# Patient Record
Sex: MALE | Race: WHITE | NOT HISPANIC OR LATINO | Employment: OTHER | URBAN - METROPOLITAN AREA
[De-identification: names, ages, dates, MRNs, and addresses within clinical notes are randomized per-mention and may not be internally consistent; named-entity substitution may affect disease eponyms.]

---

## 2017-09-11 ENCOUNTER — HOSPITAL ENCOUNTER (INPATIENT)
Facility: MEDICAL CENTER | Age: 62
LOS: 2 days | DRG: 247 | End: 2017-09-13
Attending: EMERGENCY MEDICINE | Admitting: INTERNAL MEDICINE
Payer: MEDICARE

## 2017-09-11 ENCOUNTER — RESOLUTE PROFESSIONAL BILLING HOSPITAL PROF FEE (OUTPATIENT)
Dept: HOSPITALIST | Facility: MEDICAL CENTER | Age: 62
End: 2017-09-11
Payer: MEDICARE

## 2017-09-11 DIAGNOSIS — I21.3 ST ELEVATION MYOCARDIAL INFARCTION (STEMI), UNSPECIFIED ARTERY (HCC): ICD-10-CM

## 2017-09-11 PROBLEM — Z86.79 HISTORY OF CONGESTIVE HEART FAILURE: Status: ACTIVE | Noted: 2017-09-11

## 2017-09-11 LAB
ALBUMIN SERPL BCP-MCNC: 4.1 G/DL (ref 3.2–4.9)
ALBUMIN/GLOB SERPL: 1.5 G/DL
ALP SERPL-CCNC: 51 U/L (ref 30–99)
ALT SERPL-CCNC: 20 U/L (ref 2–50)
ANION GAP SERPL CALC-SCNC: 6 MMOL/L (ref 0–11.9)
APTT PPP: 80.2 SEC (ref 24.7–36)
AST SERPL-CCNC: 64 U/L (ref 12–45)
BILIRUB SERPL-MCNC: 0.6 MG/DL (ref 0.1–1.5)
BNP SERPL-MCNC: 43 PG/ML (ref 0–100)
BUN SERPL-MCNC: 14 MG/DL (ref 8–22)
CALCIUM SERPL-MCNC: 9 MG/DL (ref 8.5–10.5)
CHLORIDE SERPL-SCNC: 105 MMOL/L (ref 96–112)
CK MB SERPL-MCNC: 102.3 NG/ML (ref 0–5)
CO2 SERPL-SCNC: 25 MMOL/L (ref 20–33)
CREAT SERPL-MCNC: 0.99 MG/DL (ref 0.5–1.4)
EKG IMPRESSION: NORMAL
EKG IMPRESSION: NORMAL
ERYTHROCYTE [DISTWIDTH] IN BLOOD BY AUTOMATED COUNT: 40.8 FL (ref 35.9–50)
GFR SERPL CREATININE-BSD FRML MDRD: >60 ML/MIN/1.73 M 2
GLOBULIN SER CALC-MCNC: 2.8 G/DL (ref 1.9–3.5)
GLUCOSE SERPL-MCNC: 128 MG/DL (ref 65–99)
HCT VFR BLD AUTO: 40.7 % (ref 42–52)
HGB BLD-MCNC: 13.9 G/DL (ref 14–18)
INR PPP: 1.06 (ref 0.87–1.13)
MCH RBC QN AUTO: 29.4 PG (ref 27–33)
MCHC RBC AUTO-ENTMCNC: 34.2 G/DL (ref 33.7–35.3)
MCV RBC AUTO: 86 FL (ref 81.4–97.8)
PLATELET # BLD AUTO: 198 K/UL (ref 164–446)
PMV BLD AUTO: 10.4 FL (ref 9–12.9)
POTASSIUM SERPL-SCNC: 4.4 MMOL/L (ref 3.6–5.5)
PROT SERPL-MCNC: 6.9 G/DL (ref 6–8.2)
PROTHROMBIN TIME: 14.1 SEC (ref 12–14.6)
RBC # BLD AUTO: 4.73 M/UL (ref 4.7–6.1)
SODIUM SERPL-SCNC: 136 MMOL/L (ref 135–145)
TROPONIN I SERPL-MCNC: 268.36 NG/ML (ref 0–0.04)
TROPONIN I SERPL-MCNC: 5.76 NG/ML (ref 0–0.04)
TROPONIN I SERPL-MCNC: >500 NG/ML (ref 0–0.04)
WBC # BLD AUTO: 12.3 K/UL (ref 4.8–10.8)

## 2017-09-11 PROCEDURE — 93005 ELECTROCARDIOGRAM TRACING: CPT | Performed by: INTERNAL MEDICINE

## 2017-09-11 PROCEDURE — 84484 ASSAY OF TROPONIN QUANT: CPT

## 2017-09-11 PROCEDURE — C1894 INTRO/SHEATH, NON-LASER: HCPCS

## 2017-09-11 PROCEDURE — 700111 HCHG RX REV CODE 636 W/ 250 OVERRIDE (IP)

## 2017-09-11 PROCEDURE — 82553 CREATINE MB FRACTION: CPT

## 2017-09-11 PROCEDURE — A9270 NON-COVERED ITEM OR SERVICE: HCPCS | Performed by: INTERNAL MEDICINE

## 2017-09-11 PROCEDURE — 770022 HCHG ROOM/CARE - ICU (200)

## 2017-09-11 PROCEDURE — 93010 ELECTROCARDIOGRAM REPORT: CPT | Mod: 76 | Performed by: INTERNAL MEDICINE

## 2017-09-11 PROCEDURE — 700101 HCHG RX REV CODE 250

## 2017-09-11 PROCEDURE — 99407 BEHAV CHNG SMOKING > 10 MIN: CPT

## 2017-09-11 PROCEDURE — 99153 MOD SED SAME PHYS/QHP EA: CPT

## 2017-09-11 PROCEDURE — 99152 MOD SED SAME PHYS/QHP 5/>YRS: CPT

## 2017-09-11 PROCEDURE — 93005 ELECTROCARDIOGRAM TRACING: CPT | Performed by: EMERGENCY MEDICINE

## 2017-09-11 PROCEDURE — 4A023N7 MEASUREMENT OF CARDIAC SAMPLING AND PRESSURE, LEFT HEART, PERCUTANEOUS APPROACH: ICD-10-PCS | Performed by: INTERNAL MEDICINE

## 2017-09-11 PROCEDURE — C1769 GUIDE WIRE: HCPCS

## 2017-09-11 PROCEDURE — C1725 CATH, TRANSLUMIN NON-LASER: HCPCS

## 2017-09-11 PROCEDURE — B2151ZZ FLUOROSCOPY OF LEFT HEART USING LOW OSMOLAR CONTRAST: ICD-10-PCS | Performed by: INTERNAL MEDICINE

## 2017-09-11 PROCEDURE — 93458 L HRT ARTERY/VENTRICLE ANGIO: CPT

## 2017-09-11 PROCEDURE — 027034Z DILATION OF CORONARY ARTERY, ONE ARTERY WITH DRUG-ELUTING INTRALUMINAL DEVICE, PERCUTANEOUS APPROACH: ICD-10-PCS | Performed by: INTERNAL MEDICINE

## 2017-09-11 PROCEDURE — 85610 PROTHROMBIN TIME: CPT

## 2017-09-11 PROCEDURE — C1887 CATHETER, GUIDING: HCPCS

## 2017-09-11 PROCEDURE — B2111ZZ FLUOROSCOPY OF MULTIPLE CORONARY ARTERIES USING LOW OSMOLAR CONTRAST: ICD-10-PCS | Performed by: INTERNAL MEDICINE

## 2017-09-11 PROCEDURE — 700111 HCHG RX REV CODE 636 W/ 250 OVERRIDE (IP): Performed by: INTERNAL MEDICINE

## 2017-09-11 PROCEDURE — 85730 THROMBOPLASTIN TIME PARTIAL: CPT

## 2017-09-11 PROCEDURE — C9606 PERC D-E COR REVASC W AMI S: HCPCS | Mod: LD

## 2017-09-11 PROCEDURE — 83880 ASSAY OF NATRIURETIC PEPTIDE: CPT

## 2017-09-11 PROCEDURE — 360979 HCHG DIAGNOSTIC CATH

## 2017-09-11 PROCEDURE — 700102 HCHG RX REV CODE 250 W/ 637 OVERRIDE(OP): Performed by: INTERNAL MEDICINE

## 2017-09-11 PROCEDURE — 304952 HCHG R 2 PADS

## 2017-09-11 PROCEDURE — 700102 HCHG RX REV CODE 250 W/ 637 OVERRIDE(OP)

## 2017-09-11 PROCEDURE — 80053 COMPREHEN METABOLIC PANEL: CPT

## 2017-09-11 PROCEDURE — 99285 EMERGENCY DEPT VISIT HI MDM: CPT

## 2017-09-11 PROCEDURE — C1874 STENT, COATED/COV W/DEL SYS: HCPCS

## 2017-09-11 PROCEDURE — A9270 NON-COVERED ITEM OR SERVICE: HCPCS

## 2017-09-11 PROCEDURE — 85027 COMPLETE CBC AUTOMATED: CPT

## 2017-09-11 PROCEDURE — 307093 HCHG TR BAND RADIAL

## 2017-09-11 PROCEDURE — 700105 HCHG RX REV CODE 258: Performed by: INTERNAL MEDICINE

## 2017-09-11 RX ORDER — OXYCODONE HYDROCHLORIDE 5 MG/1
5 TABLET ORAL EVERY 4 HOURS PRN
Status: DISCONTINUED | OUTPATIENT
Start: 2017-09-11 | End: 2017-09-13 | Stop reason: HOSPADM

## 2017-09-11 RX ORDER — AMOXICILLIN 250 MG
2 CAPSULE ORAL 2 TIMES DAILY
Status: DISCONTINUED | OUTPATIENT
Start: 2017-09-11 | End: 2017-09-13 | Stop reason: HOSPADM

## 2017-09-11 RX ORDER — ACETAMINOPHEN 325 MG/1
650 TABLET ORAL EVERY 6 HOURS PRN
Status: DISCONTINUED | OUTPATIENT
Start: 2017-09-11 | End: 2017-09-13 | Stop reason: HOSPADM

## 2017-09-11 RX ORDER — ONDANSETRON 4 MG/1
4 TABLET, ORALLY DISINTEGRATING ORAL EVERY 4 HOURS PRN
Status: DISCONTINUED | OUTPATIENT
Start: 2017-09-11 | End: 2017-09-13 | Stop reason: HOSPADM

## 2017-09-11 RX ORDER — PRASUGREL 10 MG/1
10 TABLET, FILM COATED ORAL DAILY
Status: DISCONTINUED | OUTPATIENT
Start: 2017-09-12 | End: 2017-09-13 | Stop reason: HOSPADM

## 2017-09-11 RX ORDER — ONDANSETRON 2 MG/ML
4 INJECTION INTRAMUSCULAR; INTRAVENOUS EVERY 4 HOURS PRN
Status: DISCONTINUED | OUTPATIENT
Start: 2017-09-11 | End: 2017-09-11

## 2017-09-11 RX ORDER — LISINOPRIL 5 MG/1
5 TABLET ORAL
Status: DISCONTINUED | OUTPATIENT
Start: 2017-09-12 | End: 2017-09-13 | Stop reason: HOSPADM

## 2017-09-11 RX ORDER — POLYETHYLENE GLYCOL 3350 17 G/17G
1 POWDER, FOR SOLUTION ORAL
Status: DISCONTINUED | OUTPATIENT
Start: 2017-09-11 | End: 2017-09-13 | Stop reason: HOSPADM

## 2017-09-11 RX ORDER — PROMETHAZINE HYDROCHLORIDE 12.5 MG/1
12.5-25 SUPPOSITORY RECTAL EVERY 4 HOURS PRN
Status: DISCONTINUED | OUTPATIENT
Start: 2017-09-11 | End: 2017-09-13 | Stop reason: HOSPADM

## 2017-09-11 RX ORDER — BUDESONIDE AND FORMOTEROL FUMARATE DIHYDRATE 160; 4.5 UG/1; UG/1
2 AEROSOL RESPIRATORY (INHALATION)
Status: DISCONTINUED | OUTPATIENT
Start: 2017-09-11 | End: 2017-09-12

## 2017-09-11 RX ORDER — HALOPERIDOL 5 MG/ML
1 INJECTION INTRAMUSCULAR EVERY 6 HOURS PRN
Status: DISCONTINUED | OUTPATIENT
Start: 2017-09-11 | End: 2017-09-13 | Stop reason: HOSPADM

## 2017-09-11 RX ORDER — PROMETHAZINE HYDROCHLORIDE 25 MG/1
12.5-25 TABLET ORAL EVERY 4 HOURS PRN
Status: DISCONTINUED | OUTPATIENT
Start: 2017-09-11 | End: 2017-09-13 | Stop reason: HOSPADM

## 2017-09-11 RX ORDER — DIPHENHYDRAMINE HYDROCHLORIDE 50 MG/ML
25 INJECTION INTRAMUSCULAR; INTRAVENOUS EVERY 6 HOURS PRN
Status: DISCONTINUED | OUTPATIENT
Start: 2017-09-11 | End: 2017-09-13 | Stop reason: HOSPADM

## 2017-09-11 RX ORDER — ASPIRIN 325 MG
324 TABLET ORAL ONCE
Status: ACTIVE | OUTPATIENT
Start: 2017-09-11 | End: 2017-09-12

## 2017-09-11 RX ORDER — MORPHINE SULFATE 4 MG/ML
2-4 INJECTION, SOLUTION INTRAMUSCULAR; INTRAVENOUS
Status: DISCONTINUED | OUTPATIENT
Start: 2017-09-11 | End: 2017-09-13 | Stop reason: HOSPADM

## 2017-09-11 RX ORDER — SCOLOPAMINE TRANSDERMAL SYSTEM 1 MG/1
1 PATCH, EXTENDED RELEASE TRANSDERMAL
Status: DISCONTINUED | OUTPATIENT
Start: 2017-09-11 | End: 2017-09-13 | Stop reason: HOSPADM

## 2017-09-11 RX ORDER — NITROGLYCERIN 0.4 MG/1
0.4 TABLET SUBLINGUAL
Status: DISCONTINUED | OUTPATIENT
Start: 2017-09-11 | End: 2017-09-13 | Stop reason: HOSPADM

## 2017-09-11 RX ORDER — ALBUTEROL SULFATE 90 UG/1
2 AEROSOL, METERED RESPIRATORY (INHALATION) EVERY 6 HOURS PRN
Status: DISCONTINUED | OUTPATIENT
Start: 2017-09-11 | End: 2017-09-13 | Stop reason: HOSPADM

## 2017-09-11 RX ORDER — PRASUGREL 10 MG/1
TABLET, FILM COATED ORAL
Status: COMPLETED
Start: 2017-09-11 | End: 2017-09-11

## 2017-09-11 RX ORDER — MIDAZOLAM HYDROCHLORIDE 1 MG/ML
INJECTION INTRAMUSCULAR; INTRAVENOUS
Status: COMPLETED
Start: 2017-09-11 | End: 2017-09-11

## 2017-09-11 RX ORDER — BISACODYL 10 MG
10 SUPPOSITORY, RECTAL RECTAL
Status: DISCONTINUED | OUTPATIENT
Start: 2017-09-11 | End: 2017-09-13 | Stop reason: HOSPADM

## 2017-09-11 RX ORDER — CARVEDILOL 3.12 MG/1
3.12 TABLET ORAL 2 TIMES DAILY WITH MEALS
Status: DISCONTINUED | OUTPATIENT
Start: 2017-09-12 | End: 2017-09-13 | Stop reason: HOSPADM

## 2017-09-11 RX ORDER — LIDOCAINE HYDROCHLORIDE 20 MG/ML
INJECTION, SOLUTION INFILTRATION; PERINEURAL
Status: COMPLETED
Start: 2017-09-11 | End: 2017-09-11

## 2017-09-11 RX ORDER — HEPARIN SODIUM 5000 [USP'U]/ML
5000 INJECTION, SOLUTION INTRAVENOUS; SUBCUTANEOUS EVERY 8 HOURS
Status: DISCONTINUED | OUTPATIENT
Start: 2017-09-12 | End: 2017-09-13 | Stop reason: HOSPADM

## 2017-09-11 RX ORDER — VERAPAMIL HYDROCHLORIDE 2.5 MG/ML
INJECTION, SOLUTION INTRAVENOUS
Status: COMPLETED
Start: 2017-09-11 | End: 2017-09-11

## 2017-09-11 RX ORDER — PRASUGREL 10 MG/1
60 TABLET, FILM COATED ORAL ONCE
Status: COMPLETED | OUTPATIENT
Start: 2017-09-11 | End: 2017-09-11

## 2017-09-11 RX ORDER — TIOTROPIUM BROMIDE 18 UG/1
1 CAPSULE ORAL; RESPIRATORY (INHALATION)
Status: DISCONTINUED | OUTPATIENT
Start: 2017-09-12 | End: 2017-09-12

## 2017-09-11 RX ORDER — HEPARIN SODIUM,PORCINE 1000/ML
VIAL (ML) INJECTION
Status: COMPLETED
Start: 2017-09-11 | End: 2017-09-11

## 2017-09-11 RX ORDER — DEXAMETHASONE SODIUM PHOSPHATE 4 MG/ML
4 INJECTION, SOLUTION INTRA-ARTICULAR; INTRALESIONAL; INTRAMUSCULAR; INTRAVENOUS; SOFT TISSUE
Status: DISCONTINUED | OUTPATIENT
Start: 2017-09-11 | End: 2017-09-13 | Stop reason: HOSPADM

## 2017-09-11 RX ORDER — ATORVASTATIN CALCIUM 20 MG/1
40 TABLET, FILM COATED ORAL
Status: DISCONTINUED | OUTPATIENT
Start: 2017-09-11 | End: 2017-09-13 | Stop reason: HOSPADM

## 2017-09-11 RX ORDER — NICOTINE 21 MG/24HR
21 PATCH, TRANSDERMAL 24 HOURS TRANSDERMAL
Status: DISCONTINUED | OUTPATIENT
Start: 2017-09-11 | End: 2017-09-13 | Stop reason: HOSPADM

## 2017-09-11 RX ORDER — ONDANSETRON 2 MG/ML
4 INJECTION INTRAMUSCULAR; INTRAVENOUS EVERY 4 HOURS PRN
Status: DISCONTINUED | OUTPATIENT
Start: 2017-09-11 | End: 2017-09-13 | Stop reason: HOSPADM

## 2017-09-11 RX ADMIN — EPTIFIBATIDE 17.2 MG: 2 INJECTION, SOLUTION INTRAVENOUS at 07:48

## 2017-09-11 RX ADMIN — LIDOCAINE HYDROCHLORIDE: 20 INJECTION, SOLUTION INFILTRATION; PERINEURAL at 07:35

## 2017-09-11 RX ADMIN — OXYCODONE HYDROCHLORIDE 5 MG: 5 TABLET ORAL at 17:14

## 2017-09-11 RX ADMIN — PRASUGREL 60 MG: 10 TABLET, FILM COATED ORAL at 08:05

## 2017-09-11 RX ADMIN — BIVALIRUDIN 1.75 MG/KG/HR: 250 INJECTION, POWDER, LYOPHILIZED, FOR SOLUTION INTRAVENOUS at 07:45

## 2017-09-11 RX ADMIN — STANDARDIZED SENNA CONCENTRATE AND DOCUSATE SODIUM 2 TABLET: 8.6; 5 TABLET, FILM COATED ORAL at 19:23

## 2017-09-11 RX ADMIN — HEPARIN SODIUM: 1000 INJECTION, SOLUTION INTRAVENOUS; SUBCUTANEOUS at 07:36

## 2017-09-11 RX ADMIN — Medication 60 MG: at 08:05

## 2017-09-11 RX ADMIN — NITROGLYCERIN 10 ML: 20 INJECTION INTRAVENOUS at 07:35

## 2017-09-11 RX ADMIN — BUDESONIDE AND FORMOTEROL FUMARATE DIHYDRATE 2 PUFF: 160; 4.5 AEROSOL RESPIRATORY (INHALATION) at 15:13

## 2017-09-11 RX ADMIN — MIDAZOLAM 1.5 MG: 1 INJECTION INTRAMUSCULAR; INTRAVENOUS at 08:04

## 2017-09-11 RX ADMIN — OXYCODONE HYDROCHLORIDE 5 MG: 5 TABLET ORAL at 21:06

## 2017-09-11 RX ADMIN — OXYCODONE HYDROCHLORIDE 5 MG: 5 TABLET ORAL at 12:57

## 2017-09-11 RX ADMIN — ATORVASTATIN CALCIUM 40 MG: 20 TABLET, FILM COATED ORAL at 19:23

## 2017-09-11 RX ADMIN — VERAPAMIL HYDROCHLORIDE 2.5 MG: 2.5 INJECTION, SOLUTION INTRAVENOUS at 07:48

## 2017-09-11 RX ADMIN — NICOTINE 21 MG: 21 PATCH, EXTENDED RELEASE TRANSDERMAL at 12:14

## 2017-09-11 RX ADMIN — OXYCODONE HYDROCHLORIDE 5 MG: 5 TABLET ORAL at 08:56

## 2017-09-11 RX ADMIN — HEPARIN SODIUM 2000 UNITS: 200 INJECTION, SOLUTION INTRAVENOUS at 07:47

## 2017-09-11 RX ADMIN — FENTANYL CITRATE 50 MCG: 50 INJECTION, SOLUTION INTRAMUSCULAR; INTRAVENOUS at 08:04

## 2017-09-11 ASSESSMENT — ENCOUNTER SYMPTOMS
MYALGIAS: 0
BACK PAIN: 0
HEADACHES: 0
ABDOMINAL PAIN: 0
FOCAL WEAKNESS: 0
NAUSEA: 0
DIARRHEA: 0
DIZZINESS: 0
BLOOD IN STOOL: 0
PALPITATIONS: 0
SORE THROAT: 0
SHORTNESS OF BREATH: 0
DEPRESSION: 0
CHILLS: 0
WEAKNESS: 0
VOMITING: 0
FEVER: 0
HEARTBURN: 0
HALLUCINATIONS: 0
COUGH: 0

## 2017-09-11 ASSESSMENT — COPD QUESTIONNAIRES
COPD SCREENING SCORE: 9
DURING THE PAST 4 WEEKS HOW MUCH DID YOU FEEL SHORT OF BREATH: MOST  OR ALL OF THE TIME
HAVE YOU SMOKED AT LEAST 100 CIGARETTES IN YOUR ENTIRE LIFE: YES
DO YOU EVER COUGH UP ANY MUCUS OR PHLEGM?: YES, EVERY DAY

## 2017-09-11 ASSESSMENT — PATIENT HEALTH QUESTIONNAIRE - PHQ9
1. LITTLE INTEREST OR PLEASURE IN DOING THINGS: NOT AT ALL
2. FEELING DOWN, DEPRESSED, IRRITABLE, OR HOPELESS: NOT AT ALL
SUM OF ALL RESPONSES TO PHQ QUESTIONS 1-9: 0
SUM OF ALL RESPONSES TO PHQ9 QUESTIONS 1 AND 2: 0

## 2017-09-11 ASSESSMENT — LIFESTYLE VARIABLES
DO YOU DRINK ALCOHOL: NO
EVER_SMOKED: YES
EVER_SMOKED: YES
ALCOHOL_USE: NO

## 2017-09-11 ASSESSMENT — PAIN SCALES - GENERAL
PAINLEVEL_OUTOF10: 7
PAINLEVEL_OUTOF10: 7
PAINLEVEL_OUTOF10: 6
PAINLEVEL_OUTOF10: 7
PAINLEVEL_OUTOF10: 6
PAINLEVEL_OUTOF10: 7
PAINLEVEL_OUTOF10: 6
PAINLEVEL_OUTOF10: 6
PAINLEVEL_OUTOF10: 7
PAINLEVEL_OUTOF10: 6
PAINLEVEL_OUTOF10: 7

## 2017-09-11 NOTE — DISCHARGE PLANNING
Medical Social Work    This  provided lodging letter for pt's family member Yelitza Caruso, and faxed it to [employer] per family's request.  This  also provided a copy to pt's family member as well.

## 2017-09-11 NOTE — ASSESSMENT & PLAN NOTE
He is on 2 L of oxygen nightly, none during the day.  Bronchodilators, RT protocol. Supplemental oxygen at night  Smoking cessation strongly encouraged

## 2017-09-11 NOTE — CARE PLAN
Problem: Nutritional:  Goal: Patient to verbalize or demonstrate understanding of diet  Outcome: MET Date Met: 09/11/17

## 2017-09-11 NOTE — ASSESSMENT & PLAN NOTE
Has been having chest pain since last night, found to have ST elevation and elevated troponin on admission. Was taken to the Cath Lab where a drug-eluting LAD stent was placed to a 100% occluded artery.  Given Angiomax in the Cath Lab  Dual antiplatelet therapy per cardiology  Coreg, Atorvastatin, lisinopril  Home tomorrow

## 2017-09-11 NOTE — PROGRESS NOTES
"0825 Pt admitted from cath lab, condition stable upon arriving to unit. Pt oriented to room and plan of care. Pt states chest pain \"is much better.\" TR band in place on right wrist, no oozing noted. 11mL in band per cath lab RN. Admission profile completed, family at bedside. Will continue to monitor.       "

## 2017-09-11 NOTE — DISCHARGE PLANNING
Care Transition Team Assessment    Pt admitted to [facility] on 09/11/2017 after having to be emergently taken to the Cath Lab.  Pt from [facility].  This  faxed lodging letter to [facility] per family's request.  Pt is on home oxygen through [facility].    Plan: SW will continue to follow.    Information Source  Orientation : Oriented x 4  Information Given By: Patient  Informant's Name: Migue Cervantes  Who is responsible for making decisions for patient? : Patient    Readmission Evaluation  Is this a readmission?: No    Elopement Risk  Legal Hold: No  Ambulatory or Self Mobile in Wheelchair: Yes  Disoriented: No  Psychiatric Symptoms: None  History of Wandering: No  Elopement this Admit: No  Vocalizing Wanting to Leave: No  Displays Behaviors, Body Language Wanting to Leave: No-Not at Risk for Elopement  Elopement Risk: Not at Risk for Elopement    Interdisciplinary Discharge Planning  Does Admitting Nurse Feel This Could be a Complex Discharge?: No  Primary Care Physician: no  Lives with - Patient's Self Care Capacity: Significant Other  Patient or legal guardian wants to designate a caregiver (see row info): No  Support Systems: Children, Family Member(s), Spouse / Significant Other  Housing / Facility: Other (Comments) (travel trailer)  Do You Take your Prescribed Medications Regularly: Yes  Able to Return to Previous ADL's: Yes  Mobility Issues: No  Prior Services: None  Patient Expects to be Discharged to:: home  Assistance Needed: No  Durable Medical Equipment: Home Oxygen  DME Provider / Phone: kassandra    Discharge Preparedness  What is your plan after discharge?: Uncertain - pending medical team collaboration, Home with help  What are your discharge supports?: Child, Spouse  Prior Functional Level: Independent with Activities of Daily Living, Independent with Medication Management  Difficulity with ADLs: None  Difficulity with IADLs: None    Functional Assesment  Prior Functional Level: Independent  with Activities of Daily Living, Independent with Medication Management    Finances  Financial Barriers to Discharge: No  Prescription Coverage: Yes    Vision / Hearing Impairment  Vision Impairment : Yes  Right Eye Vision: Impaired  Left Eye Vision: Impaired  Hearing Impairment : No    Values / Beliefs / Concerns  Values / Beliefs Concerns : No    Advance Directive  Advance Directive?: None  Advance Directive offered?: AD Booklet refused    Domestic Abuse  Have you ever been the victim of abuse or violence?: No  Physical Abuse or Sexual Abuse: No  Verbal Abuse or Emotional Abuse: No  Possible Abuse Reported to:: Not Applicable    Psychological Assessment  History of Substance Abuse: None  History of Psychiatric Problems: No  Non-compliant with Treatment: No  Newly Diagnosed Illness: No    Discharge Risks or Barriers  Discharge risks or barriers?: No    Anticipated Discharge Information  Anticipated discharge disposition: Discharge needs currently unknown, Home  Discharge Address: [address] [employer] [place], [place] [address] [address]

## 2017-09-11 NOTE — H&P
Hospital Medicine History and Physical    Date of Service  9/11/2017    Chief Complaint  No chief complaint on file.      History of Presenting Illness  62 y.o. male with a history of tobacco abuse who presented 9/11/2017 with chest pain.     The patient has been having on and off chest discomfort for the last several weeks to months. He says when he exerts himself he gets a centrally located chest pain, it improves with rest. [facility]. He has no personal history of heart attack in the past.    Last night, around 2 AM he was awakened from sleep with centrally located chest pain. He describes it as severe and rated about a 7 out of 10. It radiated towards both arms and both his arms felt numb. It did not improve or worsen with any position. He denies any associated palpitations or shortness of breath, he did feel slightly nauseated and very anxious. The pain continued so he came to the ER for evaluation.     He denies any recent fevers or chills, no nausea or vomiting.     ER course: He was noted to have ST elevation and elevated troponin. He was taken emergently to the cath lab. He was hemodynamically stable    Primary Care Physician  Skinny Garcia M.D.    Consultants  Dr. Arreola, cardiology    Code Status  Full    Review of Systems  Review of Systems   Constitutional: Negative for chills, fever and malaise/fatigue.   HENT: Negative for sore throat.    Respiratory: Negative for cough and shortness of breath.    Cardiovascular: Positive for chest pain. Negative for palpitations.   Gastrointestinal: Negative for abdominal pain, blood in stool, diarrhea, heartburn, nausea and vomiting.   Genitourinary: Negative for dysuria and frequency.   Musculoskeletal: Negative for back pain and myalgias.   Neurological: Negative for dizziness, focal weakness, weakness and headaches.   Psychiatric/Behavioral: Negative for depression and hallucinations.   All other  systems reviewed and are negative.       Past Medical History  Past Medical History:   Diagnosis Date   • Pain 09-20-12    lower back, 8/10   • Heart burn    • Indigestion        Surgical History  Past Surgical History:   Procedure Laterality Date   • MARQUISE BY LAPAROSCOPY N/A 12/28/2015    Procedure: MARQUISE BY LAPAROSCOPY- With Grams ;  Surgeon: Sandro Obregon M.D.;  Location: SURGERY [facility];  Service:    • LUMBAR DECOMPRESSION  9/25/2012    by Dr. Plata   • LUMBAR FUSION POSTERIOR  9/25/2012    Performed by Mckayla Plata M.D. at SURGERY [facility]   • LUMBAR DECOMPRESSION  9/25/2012    Performed by Mckayla Plata M.D. at SURGERY [facility]   • CERVICAL DISK AND FUSION ANTERIOR  1997/2010    x 2       Medications  No current facility-administered medications on file prior to encounter.      Current Outpatient Prescriptions on File Prior to Encounter   Medication Sig Dispense Refill   • oxycodone immediate-release (ROXICODONE) 5 MG Tab Take 1 Tab by mouth every four hours as needed for Severe Pain. 30 Tab 0   • lactobacillus granules (LACTINEX/FLORANEX) PACK Take 1 Packet by mouth 3 times a day, with meals. 30 Packet 0   • albuterol (VENTOLIN OR PROVENTIL) 108 (90 BASE) MCG/ACT AERS Inhale 2 Puffs by mouth every 6 hours as needed for Shortness of Breath. 8.5 g 3   • lisinopril (PRINIVIL) 10 MG TABS Take 10 mg by mouth every day.         Family History  He states his father had a stroke at age 59 and he believes he had several heart attacks in his 70s, does not know specifics. He says that his grandfather and grandmother on his father's side also had heart attacks, again he does not know details.    Social History  Social History   Substance Use Topics   • Smoking status: Current Every Day Smoker     Packs/day: 2.00     Years: 35.00     Types: Cigarettes   • Smokeless tobacco: Never Used   • Alcohol use No      Comment: hx; quit 2008       Allergies  Allergies   Allergen Reactions   • Nkda [No Known  Drug Allergy]         Physical Exam  Laboratory   Hemodynamics  Temp (24hrs), Av °C (96.8 °F), Min:36 °C (96.8 °F), Max:36 °C (96.8 °F)   Temperature: 36 °C (96.8 °F)  Pulse  Av.8  Min: 57  Max: 78 Heart Rate (Monitored): 60  NIBP: 113/67      Respiratory      Respiration: (!) 24, Pulse Oximetry: 97 %             Physical Exam   Constitutional: He is oriented to person, place, and time. He appears well-developed and well-nourished. No distress.   Neck: No JVD present. No thyromegaly present.   Cardiovascular: Normal rate and regular rhythm.    No murmur heard.  Pulmonary/Chest: No respiratory distress. He has wheezes (Scattered expiratory).   Musculoskeletal: Normal range of motion. He exhibits no edema.   Neurological: He is alert and oriented to person, place, and time. No cranial nerve deficit.   Skin: Skin is warm. No erythema.   Psychiatric: He has a normal mood and affect. His behavior is normal.       Recent Labs      17   0710   WBC  12.3*   RBC  4.73   HEMOGLOBIN  13.9*   HEMATOCRIT  40.7*   MCV  86.0   MCH  29.4   MCHC  34.2   RDW  40.8   PLATELETCT  198   MPV  10.4     Recent Labs      17   0710   SODIUM  136   POTASSIUM  4.4   CHLORIDE  105   CO2  25   GLUCOSE  128*   BUN  14   CREATININE  0.99   CALCIUM  9.0     Recent Labs      17   0710   ALTSGPT  20   ASTSGOT  64*   ALKPHOSPHAT  51   TBILIRUBIN  0.6   GLUCOSE  128*     Recent Labs      17   0710   APTT  80.2*   INR  1.06     Recent Labs      17   0710   BNPBTYPENAT  43         Lab Results   Component Value Date    TROPONINI 5.76 (H) 2017     Urinalysis:    Lab Results  Component Value Date/Time   SPECGRAVITY 1.013 2012   GLUCOSEUR Negative 2012   KETONES Negative 2012   NITRITE Negative 2012         Assessment/Plan     I anticipate this patient will require at least two midnights for appropriate medical management, necessitating inpatient admission.    * STEMI  (ST elevation myocardial infarction) (CMS-HCC)   Assessment & Plan    Has been having chest pain since last night, found to have ST elevation and elevated troponin on admission. Was taken to the Cath Lab where a drug-eluting LAD stent was placed to a 100% occluded artery.  -Given Angiomax in the Cath Lab  -Dual antiplatelet therapy per cardiology  -Start low-dose Coreg  -Atorvastatin  -Continue lisinopril  -Monitor in ICU        Leukocytosis- (present on admission)   Assessment & Plan    Lately due to stress from MI. He has no infectious complaints.  -Repeat in a.m.        Chronic back pain- (present on admission)   Assessment & Plan    He is on oxycodone at home, continue home dose        GERD (gastroesophageal reflux disease)- (present on admission)   Assessment & Plan    Likely due to tobacco, Zofran as needed  -GI cocktail as needed        Tobacco abuse- (present on admission)   Assessment & Plan    Smoking 2-3 packs per day  -counseled for more than 10 minutes on tobacco cessation [address]1228   -Patch        COPD (chronic obstructive pulmonary disease) (CMS-HCC)- (present on admission)   Assessment & Plan    He is on 2 L of oxygen nightly, none during the day.  -Symbicort, albuterol as needed  -Wife is bringing complete list of medications  -Encouraged cessation            VTE prophylaxis: Heparin.       35 minutes of critical care time were spent, including examination and interview of the patient, explanation of prognosis/diagnosis/plan of care, direction of nursing staff and discussion of the patient with other physicians involved.  This time was independent of procedures performed.    Greater than 50% of which was spent at the bedside.

## 2017-09-11 NOTE — CONSULTS
DATE OF SERVICE:  09/11/2017    REQUESTING PHYSICIAN:  Amparo Grider MD    HISTORY OF PRESENT ILLNESS:  The patient is a 62-year-old  male with   a history of hypertension, chronic tobacco use, who was transferred from   [facility] in [place], [place] to [facility]   for management of an acute ST elevation anterior myocardial infarction.    The patient states that he was awakened around 2:00 this morning with severe   diffuse substernal chest pain radiating to his neck and both arms.  He   ultimately went to the local emergency room in [place].  EKG at   [facility] showed an ST elevation anterior myocardial   infarction.  The patient received 9 mg of morphine, aspirin, sublingual   nitroglycerin x3, metoprolol 10 mg IV, and 5000 units of heparin prior to   transfer.    Currently, the patient has 8/10 chest pain.    The patient has no prior history of heart disease.  The patient denies a   history of hyperlipidemia or diabetes mellitus.  He has a family history of   his father having heart disease.    ALLERGIES:  No known drug allergies.    MEDICATIONS:  Prior to admission, lisinopril 10 mg daily, albuterol 2 puffs q.   6 hours as needed.  Oxycodone 1 tablet every 6 hours as needed.    MEDICAL AND SURGICAL HISTORY:  1.  Neck surgery.  2.  Back surgeries x2.  3.  Gastroesophageal reflux disease.  4.  Chronic back pain.  5.  Chronic opiate dependence.  6.  Scrotal cellulitis in 2014, hospitalized at [facility].  7.  Laparoscopic cholecystectomy, 12/28/2015 for acute cholecystitis with a common bile duct stone requiring ERCP. [facility].  8. COPD.  9. Partial gastrectomy for benign gastric tumor. 10/27/2014. [facility], [facility]    SOCIAL HISTORY:  Prior .  Disabled due to back pain.  Followed at   the [facility].  History of alcohol use, currently abstinent since 2008,   and  smoked for 38 years up to 2 packs per day.    FAMILY HISTORY:  Father  of congestive heart failure at age 78.    REVIEW OF SYSTEMS:  Limited due to the acute clinical circumstances, but the   patient denies a history of headache, prior stroke, recent trauma, history of   GI bleeding, prior surgery.  The patient denies a history of kidney disease.    PHYSICAL EXAMINATION:  VITAL SIGNS:  Blood pressure 114/72, pulse 82, temperature 98.4.  GENERAL:  The patient is alert and oriented, in moderate distress.  HEENT:  No facial asymmetry.  Extraocular movements are intact.  NECK:  Thick JVP, difficult to assess.  2+ carotid pulses bilaterally   symmetrical.  CHEST:  Increased AP diameter.  LUNGS:  No wheezes, rales, or rhonchi.  CARDIAC:  Distant heart sounds.  Regular rate and rhythm with no murmurs,   gallops, or rubs.  ABDOMEN:  Protuberant, soft, nontender, healed surgical scar.  EXTREMITIES:  No clubbing, cyanosis, or edema.  Pulses 2+ radial and pedal   pulses bilaterally symmetrical.  SKIN:  Warm and dry.  NEUROLOGIC:  Cranial nerves intact.  No lateralizing findings.    LABORATORY DATA:  Pending.    EKG normal sinus rhythm with an acute anterior injury, current.    ASSESSMENT:  1.  Acute ST elevation anterior myocardial infarction.  2.  Hypertension.  3.  Chronic tobacco use.    RECOMMENDATION:  The patient was informed of his current critical cardiac   condition and it was recommended that he proceed immediately with a cardiac   catheterization.  The patient understood the circumstances and risks of the   procedure and was agreeable to proceed.       ____________________________________     MD TYESHA BERNARD / JUAN    DD:  2017 09:03:00  DT:  2017 09:39:25    D#:  [id number]  Job#:  [id number]

## 2017-09-11 NOTE — CONSULTS
Cardiology Consult Note:    Carina Brandt  Date of Service:    9/11/2017   9:00 AM     Referring MD:   ER/STEMI    Patient ID:   Name:             Migue Cervantes   YOB: 1955  Age:                 62 y.o.  male   MRN:               [id number]                                                             Chief Complaint:      CP    History of Present Illness:    61 y/o male smoker c/o severe substermal CP since 2am today and found to have anterior STEMI on EKG; Transferred from [place] for LHC/PCI;     Review of Systems:      Constitutional: Denies fevers, Denies weight changes  Eyes: Denies changes in vision, no eye pain  Ears/Nose/Throat/Mouth: Denies nasal congestion or sore throat   Cardiovascular: + chest pain, - palpitations   Respiratory: + shortness of breath , Denies cough  Gastrointestinal/Hepatic: Denies abdominal pain, nausea, vomiting, diarrhea, constipation or GI bleeding   Genitourinary: Denies dysuria or frequency  Musculoskeletal/Rheum: Denies  joint pain and swelling   Skin: Denies rash  Neurological: Denies headache, confusion, memory loss or focal weakness/parasthesias  Psychiatric: denies mood disorder   Endocrine: Ariella thyroid problems  Heme/Oncology/Lymph Nodes: Denies enlarged lymph nodes, denies brusing or known bleeding disorder  All other systems were reviewed and are negative (AMA/CMS criteria)                Past Medical History:   Past Medical History:   Diagnosis Date   • Pain 09-20-12    lower back, 8/10   • Heart burn    • Indigestion      Active Hospital Problems    Diagnosis   • STEMI (ST elevation myocardial infarction) (CMS-[facility]) [I21.3]   • Leukocytosis [D72.829]   • GERD (gastroesophageal reflux disease) [K21.9]   • Chronic back pain [M54.9, G89.29]   • COPD (chronic obstructive pulmonary disease) (CMS-[facility]) [J44.9]   • Tobacco abuse [Z72.0]       Past Surgical History:  Past Surgical History:   Procedure Laterality Date   • MARQUISE BY LAPAROSCOPY N/A 12/28/2015     Procedure: MARQUISE BY LAPAROSCOPY- With Grams ;  Surgeon: Sandro Obregon M.D.;  Location: SURGERY [facility];  Service:    • LUMBAR DECOMPRESSION  9/25/2012    by Dr. Plata   • LUMBAR FUSION POSTERIOR  9/25/2012    Performed by Mckayla Plata M.D. at SURGERY [facility]   • LUMBAR DECOMPRESSION  9/25/2012    Performed by Mckayla Plata M.D. at SURGERY [facility]   • CERVICAL DISK AND FUSION ANTERIOR  1997/2010    x 2       Hospital Medications:    Current Facility-Administered Medications:   •  aspirin (ASA) tablet 325 mg, 325 mg, Oral, Once, Amparo Grider M.D., Stopped at 09/11/17 0730  •  dexamethasone (DECADRON) injection 4 mg, 4 mg, Intravenous, Once PRN, Agustín Arreola M.D.  •  diphenhydrAMINE (BENADRYL) injection 25 mg, 25 mg, Intravenous, Q6HRS PRN, Agustín Arreola M.D.  •  haloperidol lactate (HALDOL) injection 1 mg, 1 mg, Intravenous, Q6HRS PRN, Agustín Arreola M.D.  •  scopolamine (TRANSDERM-SCOP) patch 1 Patch, 1 Patch, Transdermal, Q72HRS PRN, Agustín Arreola M.D.  •  [START ON 9/12/2017] prasugrel (EFFIENT) tablet 10 mg, 10 mg, Oral, DAILY, Agustín Arreola M.D.  •  bivalirudin (ANGIOMAX) 250 mg in NS 50 mL Infusion, 0.2 mg/kg/hr, Intravenous, Continuous, Agustín Arreola M.D., Last Rate: 3.8 mL/hr at 09/11/17 0900, 0.2 mg/kg/hr at 09/11/17 0900  •  albuterol inhaler 2 Puff, 2 Puff, Inhalation, Q6HRS PRN, Heidi Anne, D.O.  •  [START ON 9/12/2017] lisinopril (PRINIVIL) tablet 5 mg, 5 mg, Oral, Q DAY, Heidi Anne D.O.  •  oxycodone immediate-release (ROXICODONE) tablet 5 mg, 5 mg, Oral, Q4HRS PRN, MYLA PimentelO., 5 mg at 09/11/17 0856  •  [START ON 9/12/2017] carvedilol (COREG) tablet 3.125 mg, 3.125 mg, Oral, BID WITH MEALS, MYLA PimentelO.  •  atorvastatin (LIPITOR) tablet 40 mg, 40 mg, Oral, QHS, GRAHAM Pimentel.O.  •  senna-docusate (PERICOLACE or SENOKOT S) 8.6-50 MG per tablet 2 Tab, 2 Tab, Oral, BID **AND** polyethylene  glycol/lytes (MIRALAX) PACKET 1 Packet, 1 Packet, Oral, QDAY PRN **AND** magnesium hydroxide (MILK OF MAGNESIA) suspension 30 mL, 30 mL, Oral, QDAY PRN **AND** bisacodyl (DULCOLAX) suppository 10 mg, 10 mg, Rectal, QDAY PRN, MYLA PimentelO.  •  [START ON 9/12/2017] heparin injection 5,000 Units, 5,000 Units, Subcutaneous, Q8HRS, GRAHAM Pimentel.O.  •  acetaminophen (TYLENOL) tablet 650 mg, 650 mg, Oral, Q6HRS PRN, MYLA PimentelO.  •  nitroglycerin (NITROSTAT) tablet 0.4 mg, 0.4 mg, Sublingual, Q5 MIN PRN, MYLA PimentelO.  •  morphine (pf) 4 mg/ml injection 2-4 mg, 2-4 mg, Intravenous, Q5 MIN PRN, MYLA PimentelO.  •  [START ON 9/12/2017] aspirin EC (ECOTRIN) tablet 81 mg, 81 mg, Oral, DAILY, MYLA PimentelO.  •  ondansetron (ZOFRAN) syringe/vial injection 4 mg, 4 mg, Intravenous, Q4HRS PRN, MYLA PimentelO.  •  ondansetron (ZOFRAN ODT) dispertab 4 mg, 4 mg, Oral, Q4HRS PRN, MYLA PimentelO.  •  promethazine (PHENERGAN) tablet 12.5-25 mg, 12.5-25 mg, Oral, Q4HRS PRN, MYLA PimentelO.  •  promethazine (PHENERGAN) suppository 12.5-25 mg, 12.5-25 mg, Rectal, Q4HRS PRN, MYLA PimentelO.  •  prochlorperazine (COMPAZINE) injection 5-10 mg, 5-10 mg, Intravenous, Q4HRS PRN, MYLA PimentelO.  •  Respiratory Care per Protocol, , Nebulization, Continuous RT, Heidi M Lewman, D.O.    Current Outpatient Medications:  Prescriptions Prior to Admission   Medication Sig Dispense Refill Last Dose   • oxycodone immediate-release (ROXICODONE) 5 MG Tab Take 1 Tab by mouth every four hours as needed for Severe Pain. 30 Tab 0    • lactobacillus granules (LACTINEX/FLORANEX) PACK Take 1 Packet by mouth 3 times a day, with meals. 30 Packet 0    • albuterol (VENTOLIN OR PROVENTIL) 108 (90 BASE) MCG/ACT AERS Inhale 2 Puffs by mouth every 6 hours as needed for Shortness of Breath. 8.5 g 3    • lisinopril (PRINIVIL) 10 MG TABS Take 10 mg by mouth every day.   4/3/2014 at Unknown        Medication Allergy:  Allergies   Allergen Reactions   • Nkda [No Known Drug Allergy]        Family History:  No family history of premature CAD, CVA  Social History:  Social History     Social History   • Marital status:      Spouse name: N/A   • Number of children: N/A   • Years of education: N/A     Occupational History   • Not on file.     Social History Main Topics   • Smoking status: Current Every Day Smoker     Packs/day: 2.00     Years: 35.00     Types: Cigarettes   • Smokeless tobacco: Never Used   • Alcohol use No      Comment: hx; quit 2008   • Drug use: No   • Sexual activity: Not on file     Other Topics Concern   • Not on file     Social History Narrative   • No narrative on file         Physical Exam:  Vitals  Weight/BMI: Body mass index is 28.7 kg/m².  Pulse 67, temperature 36 °C (96.8 °F), resp. rate (!) 24, weight 96 kg (211 lb 10.3 oz), SpO2 97 %.  Vitals:    09/11/17 0825 09/11/17 0830 09/11/17 0845 09/11/17 0855   Pulse: 65 68 (!) 57 67   Resp: 19 15 18 (!) 24   Temp: 36 °C (96.8 °F)      SpO2: 97% 97% 98% 97%   Weight:         Oxygen Therapy:  Pulse Oximetry: 97 %, O2 (LPM): 2, O2 Delivery: Silicone Nasal Cannula  General Appearance:   Well developed, Well nourished, No acute distress, Non-toxic appearance.   HENT:  Normocephalic, Atraumatic, Oropharynx moist mucous membranes, Dentition: Mallampati 4 OP, Nose normal.    Eyes:  PERRLA, EOMI, Conjunctiva normal, No discharge.  Neck:  Normal range of motion, No cervical tenderness, Supple, No stridor, no JVD .  No thyromegaly.  No carotid bruit.  Cardiovascular:  Normal heart rate, Normal rhythm,  S1, S2, no S3,  S4; No gallops; No murmurs, No rubs, .   Extremitites with intact distal pulses, no cyanosis, clubbing or edema.  No heaves, thrills, HJR;  Peripheral pulses: carotid 2+, brachial 2+, radial 2+, ulnar 2+, femoral 2+, popliteal 2+, PT 2+, DP 2+;  Lungs:  Respiratory effort is normal. Normal breath sounds, breath sounds clear to  auscultation bilaterally,  no rales, no rhonchi, no wheezing.   Abdomen: Bowel sounds normal, Soft, No tenderness, No guarding, No rebound, No masses, No hepatosplenomegaly.  Skin: Warm, Dry, No erythema, No rash, no induration or crepitus.  Neurologic: Alert & oriented x 3, Normal motor function, Normal sensory function, No focal deficits noted, cranial nerves II through XII are normal,   Psychiatric: Affect normal, Judgment normal, Mood normal.      MDM (Data Review):     Records reviewed and summarized in current documentation    Lab Data Review:  Recent Results (from the past 24 hour(s))   CBC WITHOUT DIFFERENTIAL    Collection Time: 09/11/17  7:10 AM   Result Value Ref Range    WBC 12.3 (H) 4.8 - 10.8 K/uL    RBC 4.73 4.70 - 6.10 M/uL    Hemoglobin 13.9 (L) 14.0 - 18.0 g/dL    Hematocrit 40.7 (L) 42.0 - 52.0 %    MCV 86.0 81.4 - 97.8 fL    MCH 29.4 27.0 - 33.0 pg    MCHC 34.2 33.7 - 35.3 g/dL    RDW 40.8 35.9 - 50.0 fL    Platelet Count 198 164 - 446 K/uL    MPV 10.4 9.0 - 12.9 fL   COMP METABOLIC PANEL    Collection Time: 09/11/17  7:10 AM   Result Value Ref Range    Sodium 136 135 - 145 mmol/L    Potassium 4.4 3.6 - 5.5 mmol/L    Chloride 105 96 - 112 mmol/L    Co2 25 20 - 33 mmol/L    Anion Gap 6.0 0.0 - 11.9    Glucose 128 (H) 65 - 99 mg/dL    Bun 14 8 - 22 mg/dL    Creatinine 0.99 0.50 - 1.40 mg/dL    Calcium 9.0 8.5 - 10.5 mg/dL    AST(SGOT) 64 (H) 12 - 45 U/L    ALT(SGPT) 20 2 - 50 U/L    Alkaline Phosphatase 51 30 - 99 U/L    Total Bilirubin 0.6 0.1 - 1.5 mg/dL    Albumin 4.1 3.2 - 4.9 g/dL    Total Protein 6.9 6.0 - 8.2 g/dL    Globulin 2.8 1.9 - 3.5 g/dL    A-G Ratio 1.5 g/dL   BTYPE NATRIURETIC PEPTIDE    Collection Time: 09/11/17  7:10 AM   Result Value Ref Range    B Natriuretic Peptide 43 0 - 100 pg/mL   PROTHROMBIN TIME    Collection Time: 09/11/17  7:10 AM   Result Value Ref Range    PT 14.1 12.0 - 14.6 sec    INR 1.06 0.87 - 1.13   APTT    Collection Time: 09/11/17  7:10 AM   Result Value Ref  Range    APTT 80.2 (H) 24.7 - 36.0 sec   TROPONIN    Collection Time: 17  7:10 AM   Result Value Ref Range    Troponin I 5.76 (H) 0.00 - 0.04 ng/mL   ESTIMATED GFR    Collection Time: 17  7:10 AM   Result Value Ref Range    GFR If African American >60 >60 mL/min/1.73 m 2    GFR If Non African American >60 >60 mL/min/1.73 m 2   EKG NOW IF NOT DONE    Collection Time: 17  8:34 AM   Result Value Ref Range    Report       [facility]    Test Date:  2017  Pt Name:    MIRLANDE DOZIER                  Department: ER  MRN:        [id number]                      Room:       T629  Gender:     M                            Technician: ALLIE  :        [id number]                   Requested By:ALPESH SCHAFER  Order #:    [id number]                    Reading MD:    Measurements  Intervals                                Axis  Rate:       57                           P:          53  KY:         180                          QRS:        33  QRSD:       92                           T:          93  QT:         456  QTc:        444    Interpretive Statements  SINUS BRADYCARDIA  ANTERIOR INFARCT, AGE INDETERMINATE  Compared to ECG 2015 15:59:18  Myocardial infarct finding now present  Sinus tachycardia no longer present  T-wave abnormality no longer present         Imaging/Procedures Review:    Chest Xray:  Reviewed    EKG:   As in HPI. Ant STEMI    MDM (Assessment and Plan):     Active Hospital Problems    Diagnosis   • STEMI (ST elevation myocardial infarction) (CMS-[facility]) [I21.3]   • Leukocytosis [D72.829]   • GERD (gastroesophageal reflux disease) [K21.9]   • Chronic back pain [M54.9, G89.29]   • COPD (chronic obstructive pulmonary disease) (CMS-HCC) [J44.9]   • Tobacco abuse [Z72.0]     Reported LAD stenting  Rec: BB, ACEI/ARB, DAPT x 12 months;  ICR  Echocardiogram  Smoking cessation  Consult hospitalist for in house care  Will follow  Thx

## 2017-09-11 NOTE — ASSESSMENT & PLAN NOTE
The patient doesn't know if this is systolic or diastolic. [facility]. His EF noted during the cath was 45%. He has no evidence of fluid overload  Await 9/12/2017 echocardiogram

## 2017-09-11 NOTE — ED PROVIDER NOTES
ED Provider Note    Scribed for Amparo Grider M.D. by Isabell De Jesus. 9/11/2017, 7:14 AM.    Primary care provider: Skinny Garcia M.D.  Means of arrival: Ambulance  History obtained from: Patient  History limited by: None    CHIEF COMPLAINT  STEMI    HPI  Migue Cervantes is a 62 y.o. male who presents to the Emergency Department after being brought in by ambulance for acute STEMI consistent on EKG. The patient reports suddenly developing pressured chest pains at 2AM this morning that woke him up from sleep, initially rating the pain 10/10 in severity. He developed associated nausea, sweats, and feeling clammy overall. He was seen at [place] for symptoms and transferred to [facility] ED for higher level care due to acute STEMI found on EKG. He received Heparin, Lopressor, and Aspirin prior to arrival. The patient currently complains of similar chest pains and rates it 8/10 in severity. He has past medical history of hypertension and cardiac disease.     REVIEW OF SYSTEMS  Pertinent positives include chest pain, nausea, sweats, and clamminess. Pertinent negatives include no fever. All other systems reviewed and negative or unable to obtain.   C.    PAST MEDICAL HISTORY   has a past medical history of Heart burn; Indigestion; Hypertension; and Pain (09-20-12).    SURGICAL HISTORY   has a past surgical history that includes cervical disk and fusion anterior (1997/2010); lumbar decompression (9/25/2012); lumbar fusion posterior (9/25/2012); lumbar decompression (9/25/2012); and beto by laparoscopy (N/A, 12/28/2015).    SOCIAL HISTORY  Social History   Substance Use Topics   • Smoking status: Current Every Day Smoker     Packs/day: 2.00     Years: 35.00     Types: Cigarettes   • Smokeless tobacco: Never Used   • Alcohol use No      Comment: hx; quit 2008      History   Drug Use No     FAMILY HISTORY  No family history noted     CURRENT MEDICATIONS    Current Facility-Administered Medications:   •  aspirin (ASA) tablet  325 mg, 325 mg, Oral, Once, Amparo Grider M.D., Stopped at 09/11/17 0730  •  dexamethasone (DECADRON) injection 4 mg, 4 mg, Intravenous, Once PRN, Agustín Arreola M.D.  •  diphenhydrAMINE (BENADRYL) injection 25 mg, 25 mg, Intravenous, Q6HRS PRN, Agustín Arreola M.D.  •  haloperidol lactate (HALDOL) injection 1 mg, 1 mg, Intravenous, Q6HRS PRN, Agustín Arreola M.D.  •  scopolamine (TRANSDERM-SCOP) patch 1 Patch, 1 Patch, Transdermal, Q72HRS PRN, Agustín Arreola M.D.  •  [START ON 9/12/2017] prasugrel (EFFIENT) tablet 10 mg, 10 mg, Oral, DAILY, Agustín Arreola M.D.  •  bivalirudin (ANGIOMAX) 250 mg in NS 50 mL Infusion, 0.2 mg/kg/hr, Intravenous, Continuous, Agustín Arreola M.D., Last Rate: 3.8 mL/hr at 09/11/17 0900, 0.2 mg/kg/hr at 09/11/17 0900  •  albuterol inhaler 2 Puff, 2 Puff, Inhalation, Q6HRS PRN, GRAHAM Pimentel.O.  •  [START ON 9/12/2017] lisinopril (PRINIVIL) tablet 5 mg, 5 mg, Oral, Q DAY, GRHAAM Pimentel.O.  •  oxycodone immediate-release (ROXICODONE) tablet 5 mg, 5 mg, Oral, Q4HRS PRN, GRAHAM Pimentel.O., 5 mg at 09/11/17 1257  •  [START ON 9/12/2017] carvedilol (COREG) tablet 3.125 mg, 3.125 mg, Oral, BID WITH MEALS, GRAHAM Pimentel.O.  •  atorvastatin (LIPITOR) tablet 40 mg, 40 mg, Oral, QHS, GRAHAM Pimentel.O.  •  senna-docusate (PERICOLACE or SENOKOT S) 8.6-50 MG per tablet 2 Tab, 2 Tab, Oral, BID **AND** polyethylene glycol/lytes (MIRALAX) PACKET 1 Packet, 1 Packet, Oral, QDAY PRN **AND** magnesium hydroxide (MILK OF MAGNESIA) suspension 30 mL, 30 mL, Oral, QDAY PRN **AND** bisacodyl (DULCOLAX) suppository 10 mg, 10 mg, Rectal, QDAY PRN, MYLA PimentelO.  •  [START ON 9/12/2017] heparin injection 5,000 Units, 5,000 Units, Subcutaneous, Q8HRS, GRAHAM Pimentel.O.  •  acetaminophen (TYLENOL) tablet 650 mg, 650 mg, Oral, Q6HRS PRN, MYLA PimentelO.  •  nitroglycerin (NITROSTAT) tablet 0.4 mg, 0.4 mg, Sublingual, Q5 MIN PRN,  Heidi Anne D.O.  •  morphine (pf) 4 mg/ml injection 2-4 mg, 2-4 mg, Intravenous, Q5 MIN PRN, Heidi Anne D.O.  •  [START ON 9/12/2017] aspirin EC (ECOTRIN) tablet 81 mg, 81 mg, Oral, DAILY, MYLA PimentelO.  •  ondansetron (ZOFRAN) syringe/vial injection 4 mg, 4 mg, Intravenous, Q4HRS PRN, Heidi Anne D.O.  •  ondansetron (ZOFRAN ODT) dispertab 4 mg, 4 mg, Oral, Q4HRS PRN, Heidi Anne D.O.  •  promethazine (PHENERGAN) tablet 12.5-25 mg, 12.5-25 mg, Oral, Q4HRS PRN, Heidi Anne D.O.  •  promethazine (PHENERGAN) suppository 12.5-25 mg, 12.5-25 mg, Rectal, Q4HRS PRN, Heidi Anne D.O.  •  prochlorperazine (COMPAZINE) injection 5-10 mg, 5-10 mg, Intravenous, Q4HRS PRN, Heidi Anne D.O.  •  Respiratory Care per Protocol, , Nebulization, Continuous RT, Heidi Anne D.O.  •  nicotine (NICODERM) 21 MG/24HR 21 mg, 21 mg, Transdermal, Daily-0600, Heidi Anne D.O., 21 mg at 09/11/17 1214  •  budesonide-formoterol (SYMBICORT) 160-4.5 MCG/ACT inhaler 2 Puff, 2 Puff, Inhalation, BID (RT), Heidi Anne D.O.     ALLERGIES  Allergies   Allergen Reactions   • Nkda [No Known Drug Allergy]        PHYSICAL EXAM  VITAL SIGNS: Wt 96 kg (211 lb 10.3 oz)   BMI 28.70 kg/m²    Pulse 60   Temp 36.3 °C (97.3 °F)   Resp 19   Ht 1.829 m (6')   Wt 103 kg (227 lb)   SpO2 100%   BMI 30.79 kg/m²     Constitutional:  Sitting up in gurney, able to answer questions  HENT: Nose is normal in appearance, external ears are normal,  moist mucous membranes  Eyes: Anicteric,  pupils are equal round and reactive, there is no conjunctival drainage or pallor   Neck: The trachea is midline, there is no obvious mass or meningeal signs  Cardiovascular: Good perfusion, regular rate and rhythm without murmurs gallops or rubs  Thorax & Lungs: Respiratory rate and effort are normal. There is normal chest excursion with respiration.  No wheezes rhonchi or rales noted.  Abdomen: Abdomen is normal in  appearance, no gross peritoneal signs  normal bowel sounds, no pain with cough  :  No CVA tenderness to palpation  Musculoskeletal: No deformities noted in all 4 extremities.   Skin: Visualized skin is warm without rash.  Neurologic:  Cranial nerves II through XII are intact there is no focal abnormality noted.  Psychiatric: Normal mood and mentation    DIAGNOSTIC STUDIES / PROCEDURES    LABS  Results for orders placed or performed during the hospital encounter of 09/11/17   CBC WITHOUT DIFFERENTIAL   Result Value Ref Range    WBC 12.3 (H) 4.8 - 10.8 K/uL    RBC 4.73 4.70 - 6.10 M/uL    Hemoglobin 13.9 (L) 14.0 - 18.0 g/dL    Hematocrit 40.7 (L) 42.0 - 52.0 %    MCV 86.0 81.4 - 97.8 fL    MCH 29.4 27.0 - 33.0 pg    MCHC 34.2 33.7 - 35.3 g/dL    RDW 40.8 35.9 - 50.0 fL    Platelet Count 198 164 - 446 K/uL    MPV 10.4 9.0 - 12.9 fL   COMP METABOLIC PANEL   Result Value Ref Range    Sodium 136 135 - 145 mmol/L    Potassium 4.4 3.6 - 5.5 mmol/L    Chloride 105 96 - 112 mmol/L    Co2 25 20 - 33 mmol/L    Anion Gap 6.0 0.0 - 11.9    Glucose 128 (H) 65 - 99 mg/dL    Bun 14 8 - 22 mg/dL    Creatinine 0.99 0.50 - 1.40 mg/dL    Calcium 9.0 8.5 - 10.5 mg/dL    AST(SGOT) 64 (H) 12 - 45 U/L    ALT(SGPT) 20 2 - 50 U/L    Alkaline Phosphatase 51 30 - 99 U/L    Total Bilirubin 0.6 0.1 - 1.5 mg/dL    Albumin 4.1 3.2 - 4.9 g/dL    Total Protein 6.9 6.0 - 8.2 g/dL    Globulin 2.8 1.9 - 3.5 g/dL    A-G Ratio 1.5 g/dL   BTYPE NATRIURETIC PEPTIDE   Result Value Ref Range    B Natriuretic Peptide 43 0 - 100 pg/mL   PROTHROMBIN TIME   Result Value Ref Range    PT 14.1 12.0 - 14.6 sec    INR 1.06 0.87 - 1.13   APTT   Result Value Ref Range    APTT 80.2 (H) 24.7 - 36.0 sec   TROPONIN   Result Value Ref Range    Troponin I 5.76 (H) 0.00 - 0.04 ng/mL   CKMB   Result Value Ref Range    CK-Mb 102.3 (H) 0.0 - 5.0 ng/mL   ESTIMATED GFR   Result Value Ref Range    GFR If African American >60 >60 mL/min/1.73 m 2    GFR If Non African American >60  >60 mL/min/1.73 m 2   All labs reviewed by me.    EKG  Patient was taken to cath lab prior to me seeing EKG. EKG read by Cardiology.    COURSE & MEDICAL DECISION MAKING  Nursing notes and vital signs were reviewed. (See chart for details)  The patient's  records were reviewed, history was obtained from the patient and EMS;     7:07 AM I was acutely called to see and examine the patient in [facility] [place] with Dr. Arreola (Cardiology). Ordered labs for further evaluation.    7:10 AM Patient was immediately taken to cath lab for Angiogram due to acute STEMI and continued chest pains.     7:35 AM Paged Cardiology    7:51 AM I discussed the patient's case and the above findings with Dr. Anne (Hospitalist) who agreed to admit the patient.    DISPOSITION:  Patient will be admitted to Dr. Anne in guarded condition.     FINAL IMPRESSION  1. ST elevation myocardial infarction (STEMI), unspecified artery (CMS-HCC)       IIsabell (Scribe), am scribing for, and in the presence of, Amparo Grider M.D..    Electronically signed by: Isabell De Jesus (Scribe), 9/11/2017    IAmparo M.D. personally performed the services described in this documentation, as scribed by Isabell De Jesus in my presence, and it is both accurate and complete.    The note accurately reflects work and decisions made by me.  Amparo Grider  9/11/2017  1:01 PM

## 2017-09-11 NOTE — PROCEDURES
DATE OF PROCEDURE: 09/11/2017    PROCEDURE:  1. Cardiac catheterization.  A. Left heart catheterization.  B. Left ventriculography.  C. Coronary stent implantation proximal left anterior descending artery 4.0 x 20 mm drug-eluting [employer] energy stent.  D. Selective coronary angiography.  E Right radial artery approach.    PRE-PROCEDURE DIAGNOSIS:  1. STEMI-acute ST elevation anterior myocardial infarction.  2. Hypertension.  3. Chronic tobacco use.    POST-PROCEDURE DIAGNOSIS:  1. Left anterior descending artery proximal 100% occlusion.  2. Left ventricular ejection fraction 45% with anterior apical akinesis.    PHYSICIAN: Agustín Arreola M.D.    REQUESTING PHYSICIAN: Amparo Grider M.D. ER physician    COMPLICATIONS: No    MEDICATIONS:  1. Versed IV.  2. Fentanyl IV.  3. Lidocaine 2% subcutaneous.  4. Heparin 3000 units IA.  5. Nitroglycerin 100 µg IA.  6. Verapamil 2.5 mg IA.  7. Angiomax IV bolus and infusion.  8. Integrilin IV bolus.  9. Nitroglycerin 150 µg IC.    INDICATIONS: The patient is a 62-year-old male with a history of hypertension and chronic tobacco use who is transferred from [facility] in [facility] to [facility] for ongoing management of an acute ST elevation anterior myocardial infarction.  The patient has been referred for a cardiac catheterization with a consideration of coronary intervention.    PROCEDURE:  After informed consent was obtained the patient was brought to the cardiac catheterization laboratory.    After establishing adequate collateral circulation to the right hand with the pressure and   oximetry guided Te's test, the volar surface of the right wrist was prepped and draped and anesthetized in the usual manner.    Using a modified Seldinger technique, a 6 [place]i x 10 cm introducer sheath was inserted into the right radial artery. Heparin, verapamil and nitroglycerin were given via the side-port.    Next, a 6 [place]i JR  4.0 right coronary catheter was inserted into the right coronary artery and right coronary angiograms were obtained in various projections.    Next, a 6 Latvian extra backup 3.5 guiding catheter was inserted into the ostium of the left coronary artery and left coronary angiograms were obtained various projections.    Initial angiograms demonstrated 100% occlusion of the proximal left anterior descending artery.  IV Angiomax was given.  Next, a 0.014 whisper wire was advanced through the proximal occlusion into the distal LAD.  Next, a 2.5 x 15 mm balloon catheter was inserted across the proximal occlusion and inflated up to 14 zander.  Subsequent angiograms demonstrated improved antegrade flow.  Next, a 3.0 x 15 mm balloon catheter was inserted across the proximal LAD stenosis and inflated up to 14 zander.  Next, a 4.0 x 20 mm [employer] drug-eluting Synergy stent was placed across the residual proximal LAD stenosis and deployed at 11 zander with post-deployment inflation of 11 zander. The balloon was removed.  Next, a 4.0 x 12 mm noncompliant balloon catheter was inserted in the proximal portion of the stent and inflated up to 14 zander. The balloon and wire removed and final angiograms were performed.    Next, a 4 Latvian pigtail catheter was inserted into the left ventricle under fluoroscopic guidance. A single plane MONK left ventricular angiogram was performed. Pre-and post-angiogram LVEDP, LV and aortic pressures were obtained.    At the end of the procedure all catheters were removed. Hemostasis was achieved with a wrist band device at the right radial artery site.    The patient tolerated the procedure well.    FINDINGS:  HEMODYNAMICS:  1. LVEDP 40 mmHg.  2. Left ventricular systolic pressure 114 mmHg.  3. Central aortic pressure systolic 115, diastolic 70, mean 90 mmHg.    LEFT VENTRICULOGRAPHY:  Left ventricular ejection fraction was 45% with large anterior apical akinesis.    CORONARY ARTERIOGRAPHY:  LEFT MAIN  ARTERY: The left main artery is a large-caliber vessel and angiographically normal and bifurcates into a left anterior descending artery and circumflex artery.    LEFT ANTERIOR DESCENDING ARTERY: The left anterior descending artery has a proximal 100% occlusion.    CIRCUMFLEX ARTERY: The circumflex artery gives rise to 2 major marginal branches. The circumflex artery is widely patent with mild smooth intimal atheroma.    RIGHT CORONARY ARTERY: The right coronary artery is a large caliber vessel and gives rise to a posterior descending artery and a posterolateral branch.  The right coronary artery is widely patent with diffuse mild smooth intimal atheroma.     POST-STENT IMPLANTATION of the proximal left anterior descending artery with a 4.0 x 20 mm drug-eluting stent [employer] demonstrates good stent expansion, 0% residual stenosis with MABLE-3 antegrade flow.    PLAN:  1. Admit to CCU.  2. Angiomax ×6 hours post-PCI.  3. Dual antiplatelet therapy with Effient and aspirin.  4. Maximize optimal guide line directed medical therapy for CAD, post MI, post-PCI including smoking cessation.

## 2017-09-12 ENCOUNTER — PATIENT OUTREACH (OUTPATIENT)
Dept: HEALTH INFORMATION MANAGEMENT | Facility: OTHER | Age: 62
End: 2017-09-12

## 2017-09-12 ENCOUNTER — HOSPITAL ENCOUNTER (OUTPATIENT)
Dept: RADIOLOGY | Facility: MEDICAL CENTER | Age: 62
End: 2017-09-12

## 2017-09-12 LAB
ANION GAP SERPL CALC-SCNC: 6 MMOL/L (ref 0–11.9)
BASOPHILS # BLD AUTO: 0.3 % (ref 0–1.8)
BASOPHILS # BLD: 0.03 K/UL (ref 0–0.12)
BUN SERPL-MCNC: 11 MG/DL (ref 8–22)
CALCIUM SERPL-MCNC: 9.6 MG/DL (ref 8.5–10.5)
CHLORIDE SERPL-SCNC: 103 MMOL/L (ref 96–112)
CHOLEST SERPL-MCNC: 129 MG/DL (ref 100–199)
CO2 SERPL-SCNC: 26 MMOL/L (ref 20–33)
CREAT SERPL-MCNC: 0.89 MG/DL (ref 0.5–1.4)
EOSINOPHIL # BLD AUTO: 0.06 K/UL (ref 0–0.51)
EOSINOPHIL NFR BLD: 0.6 % (ref 0–6.9)
ERYTHROCYTE [DISTWIDTH] IN BLOOD BY AUTOMATED COUNT: 40 FL (ref 35.9–50)
GFR SERPL CREATININE-BSD FRML MDRD: >60 ML/MIN/1.73 M 2
GLUCOSE SERPL-MCNC: 116 MG/DL (ref 65–99)
HCT VFR BLD AUTO: 43.3 % (ref 42–52)
HDLC SERPL-MCNC: 27 MG/DL
HGB BLD-MCNC: 15.2 G/DL (ref 14–18)
IMM GRANULOCYTES # BLD AUTO: 0.04 K/UL (ref 0–0.11)
IMM GRANULOCYTES NFR BLD AUTO: 0.4 % (ref 0–0.9)
LDLC SERPL CALC-MCNC: 69 MG/DL
LV EJECT FRACT  99904: 50
LV EJECT FRACT MOD 2C 99903: 41.52
LV EJECT FRACT MOD 4C 99902: 50.49
LV EJECT FRACT MOD BP 99901: 46.38
LYMPHOCYTES # BLD AUTO: 1.65 K/UL (ref 1–4.8)
LYMPHOCYTES NFR BLD: 15.5 % (ref 22–41)
MCH RBC QN AUTO: 29.8 PG (ref 27–33)
MCHC RBC AUTO-ENTMCNC: 35.1 G/DL (ref 33.7–35.3)
MCV RBC AUTO: 84.9 FL (ref 81.4–97.8)
MONOCYTES # BLD AUTO: 0.74 K/UL (ref 0–0.85)
MONOCYTES NFR BLD AUTO: 7 % (ref 0–13.4)
NEUTROPHILS # BLD AUTO: 8.11 K/UL (ref 1.82–7.42)
NEUTROPHILS NFR BLD: 76.2 % (ref 44–72)
NRBC # BLD AUTO: 0 K/UL
NRBC BLD AUTO-RTO: 0 /100 WBC
PLATELET # BLD AUTO: 194 K/UL (ref 164–446)
PMV BLD AUTO: 10.6 FL (ref 9–12.9)
POTASSIUM SERPL-SCNC: 3.9 MMOL/L (ref 3.6–5.5)
RBC # BLD AUTO: 5.1 M/UL (ref 4.7–6.1)
SODIUM SERPL-SCNC: 135 MMOL/L (ref 135–145)
TRIGL SERPL-MCNC: 167 MG/DL (ref 0–149)
WBC # BLD AUTO: 10.6 K/UL (ref 4.8–10.8)

## 2017-09-12 PROCEDURE — A9270 NON-COVERED ITEM OR SERVICE: HCPCS | Performed by: INTERNAL MEDICINE

## 2017-09-12 PROCEDURE — 99232 SBSQ HOSP IP/OBS MODERATE 35: CPT | Performed by: HOSPITALIST

## 2017-09-12 PROCEDURE — 93306 TTE W/DOPPLER COMPLETE: CPT

## 2017-09-12 PROCEDURE — 85025 COMPLETE CBC W/AUTO DIFF WBC: CPT

## 2017-09-12 PROCEDURE — 80061 LIPID PANEL: CPT

## 2017-09-12 PROCEDURE — 700111 HCHG RX REV CODE 636 W/ 250 OVERRIDE (IP): Performed by: INTERNAL MEDICINE

## 2017-09-12 PROCEDURE — 770022 HCHG ROOM/CARE - ICU (200)

## 2017-09-12 PROCEDURE — 700102 HCHG RX REV CODE 250 W/ 637 OVERRIDE(OP): Performed by: INTERNAL MEDICINE

## 2017-09-12 PROCEDURE — 93306 TTE W/DOPPLER COMPLETE: CPT | Mod: 26 | Performed by: INTERNAL MEDICINE

## 2017-09-12 PROCEDURE — 80048 BASIC METABOLIC PNL TOTAL CA: CPT

## 2017-09-12 RX ORDER — BUDESONIDE AND FORMOTEROL FUMARATE DIHYDRATE 160; 4.5 UG/1; UG/1
2 AEROSOL RESPIRATORY (INHALATION) 2 TIMES DAILY
Status: DISCONTINUED | OUTPATIENT
Start: 2017-09-12 | End: 2017-09-13 | Stop reason: HOSPADM

## 2017-09-12 RX ORDER — TIOTROPIUM BROMIDE 18 UG/1
1 CAPSULE ORAL; RESPIRATORY (INHALATION) DAILY
Status: DISCONTINUED | OUTPATIENT
Start: 2017-09-13 | End: 2017-09-13 | Stop reason: HOSPADM

## 2017-09-12 RX ADMIN — NICOTINE 21 MG: 21 PATCH, EXTENDED RELEASE TRANSDERMAL at 07:13

## 2017-09-12 RX ADMIN — HEPARIN SODIUM 5000 UNITS: 5000 INJECTION, SOLUTION INTRAVENOUS; SUBCUTANEOUS at 07:13

## 2017-09-12 RX ADMIN — DIPHENHYDRAMINE HYDROCHLORIDE 25 MG: 50 INJECTION, SOLUTION INTRAMUSCULAR; INTRAVENOUS at 20:32

## 2017-09-12 RX ADMIN — HEPARIN SODIUM 5000 UNITS: 5000 INJECTION, SOLUTION INTRAVENOUS; SUBCUTANEOUS at 20:32

## 2017-09-12 RX ADMIN — OXYCODONE HYDROCHLORIDE 5 MG: 5 TABLET ORAL at 20:32

## 2017-09-12 RX ADMIN — ATORVASTATIN CALCIUM 40 MG: 20 TABLET, FILM COATED ORAL at 20:32

## 2017-09-12 RX ADMIN — PRASUGREL HYDROCHLORIDE 10 MG: 10 TABLET, FILM COATED ORAL at 08:42

## 2017-09-12 RX ADMIN — ASPIRIN 81 MG: 81 TABLET ORAL at 08:42

## 2017-09-12 RX ADMIN — CARVEDILOL 3.12 MG: 3.12 TABLET, FILM COATED ORAL at 08:43

## 2017-09-12 RX ADMIN — HEPARIN SODIUM 5000 UNITS: 5000 INJECTION, SOLUTION INTRAVENOUS; SUBCUTANEOUS at 13:49

## 2017-09-12 RX ADMIN — OXYCODONE HYDROCHLORIDE 5 MG: 5 TABLET ORAL at 13:49

## 2017-09-12 RX ADMIN — LISINOPRIL 5 MG: 5 TABLET ORAL at 08:42

## 2017-09-12 RX ADMIN — STANDARDIZED SENNA CONCENTRATE AND DOCUSATE SODIUM 2 TABLET: 8.6; 5 TABLET, FILM COATED ORAL at 08:42

## 2017-09-12 RX ADMIN — OXYCODONE HYDROCHLORIDE 5 MG: 5 TABLET ORAL at 02:08

## 2017-09-12 RX ADMIN — OXYCODONE HYDROCHLORIDE 5 MG: 5 TABLET ORAL at 08:42

## 2017-09-12 ASSESSMENT — ENCOUNTER SYMPTOMS
SHORTNESS OF BREATH: 0
DIZZINESS: 0
ABDOMINAL PAIN: 0
NERVOUS/ANXIOUS: 0
FEVER: 0
COUGH: 0
SPEECH CHANGE: 0
DEPRESSION: 0
BACK PAIN: 0
PALPITATIONS: 0
NAUSEA: 0
HEADACHES: 0

## 2017-09-12 ASSESSMENT — PAIN SCALES - GENERAL
PAINLEVEL_OUTOF10: 6
PAINLEVEL_OUTOF10: 5
PAINLEVEL_OUTOF10: 7
PAINLEVEL_OUTOF10: 6
PAINLEVEL_OUTOF10: 7
PAINLEVEL_OUTOF10: 5
PAINLEVEL_OUTOF10: 6

## 2017-09-12 ASSESSMENT — LIFESTYLE VARIABLES: SUBSTANCE_ABUSE: 0

## 2017-09-12 NOTE — PROGRESS NOTES
Cardiovascular Nurse Navigator ([id number]) Note:    Reviewed STEMI medications:  DAPT: aspirin + prasugrel  Beta-Blocker:  carvedilol  Statin:  atorvastatin    Consider for aldosterone blockade?   No -- EF 45%  Consider for ACE-I? Is prescribed, EF 45%    Intensive Cardiac Rehab (ICR) Referral:  Referred on 9/11; has current inpatient orders for nutrition consult & PT for Phase I ICR    Inpatient & Discharge Patient Education:  Bedside nursing to continually provide patient education on ACS meds, signs and symptoms to monitor for, and risk factor modification.     Also at discharge please complete the “ACS” special instructions on the AVS.      Orders Placed:    Social Work     To assess for social barriers to management of disease process. Also to ensure that pt can afford prasugrel.    Hospital Schedulers     To arrange f/u with cardiology, preferably within 7-10 days of anticipated discharge date: 9/14. Pt resides in [place].    Referrals Placed:    Patient has Medicare insurance and as such is not eligible for the [facility] Paramedicine MI Program.    Thank you and please call with questions.

## 2017-09-12 NOTE — CARE PLAN
Problem: Safety  Goal: Will remain free from injury  Outcome: PROGRESSING AS EXPECTED  Bed locked and in lowest position, non skid socks in place, call light in reach    Problem: Knowledge Deficit  Goal: Knowledge of disease process/condition, treatment plan, diagnostic tests, and medications will improve  Outcome: PROGRESSING AS EXPECTED  Updated with plan of care, echo today, pos d/c severino

## 2017-09-12 NOTE — PROGRESS NOTES
Pt a&o x4, denies chest pain/pressure, and no distress noted. Respirations are regular and non labored.  Up ambulating with steady gait.  Updated with plan of care, call light in reach and encourage to call for assistance.

## 2017-09-12 NOTE — DISCHARGE PLANNING
SW responded to STEMI. Pt stated that his spouse and daughter are on their way to [place]. Per notes, family has made it to [facility]. SW to remain available.

## 2017-09-12 NOTE — DISCHARGE PLANNING
STEMI admit.   Stent placement.  Room air. 2L over night.      Plan:  Family is bedside. Home with Help.  Resides in Travel trailer with SO.

## 2017-09-12 NOTE — PROGRESS NOTES
Date of Service: 9/12/2017  Chief Complaint:No chief complaint on file.    Interval History:  63 y/o male smoker c/o severe substermal CP since 2am today and found to have anterior STEMI on EKG; Transferred from [place] for LHC/PCI; s/p LAD  stenting  All recent medication, labs, imaging studies and procedures reviewed    Physical Exam   Blood pressure 111/73, pulse 75, temperature 36.5 °C (97.7 °F), resp. rate 20, height 1.829 m (6'), weight 98.5 kg (217 lb 2.5 oz), SpO2 92 %.    Constitutional:  He appears well-developed.   HENT: Normocephalic and atraumatic. No scleral icterus.   Neck: No JVD present.   Cardiovascular: Normal rate.RRR;  Exam reveals no gallop and no friction rub. No murmur heard.   Pulmonary/Chest: CTAB coarse   Abdominal: S/NT/ND BS+   Musculoskeletal: He exhibits no edema. Pulses present.  Skin: Skin is warm and dry.       Intake/Output Summary (Last 24 hours) at 09/12/17 1517  Last data filed at 09/12/17 1200   Gross per 24 hour   Intake             1040 ml   Output             3650 ml   Net            -2610 ml       LABS:  Lab Results   Component Value Date/Time    CHOLSTRLTOT 129 09/12/2017 04:14 AM    LDL 69 09/12/2017 04:14 AM    HDL 27 (A) 09/12/2017 04:14 AM    TRIGLYCERIDE 167 (H) 09/12/2017 04:14 AM       Lab Results   Component Value Date/Time    WBC 10.6 09/12/2017 04:14 AM    RBC 5.10 09/12/2017 04:14 AM    HEMOGLOBIN 15.2 09/12/2017 04:14 AM    HEMATOCRIT 43.3 09/12/2017 04:14 AM    MCV 84.9 09/12/2017 04:14 AM    NEUTSPOLYS 76.20 (H) 09/12/2017 04:14 AM    LYMPHOCYTES 15.50 (L) 09/12/2017 04:14 AM    MONOCYTES 7.00 09/12/2017 04:14 AM    EOSINOPHILS 0.60 09/12/2017 04:14 AM    BASOPHILS 0.30 09/12/2017 04:14 AM     Lab Results   Component Value Date/Time    SODIUM 135 09/12/2017 04:14 AM    POTASSIUM 3.9 09/12/2017 04:14 AM    CHLORIDE 103 09/12/2017 04:14 AM    CO2 26 09/12/2017 04:14 AM    GLUCOSE 116 (H) 09/12/2017 04:14 AM    BUN 11 09/12/2017 04:14 AM    CREATININE 0.89  09/12/2017 04:14 AM         Lab Results   Component Value Date/Time    ALKPHOSPHAT 51 09/11/2017 07:10 AM    ASTSGOT 64 (H) 09/11/2017 07:10 AM    ALTSGPT 20 09/11/2017 07:10 AM    TBILIRUBIN 0.6 09/11/2017 07:10 AM      Lab Results   Component Value Date/Time    BNPBTYPENAT 43 09/11/2017 07:10 AM      No results found for: TSH  Lab Results   Component Value Date/Time    PROTHROMBTM 14.1 09/11/2017 07:10 AM    INR 1.06 09/11/2017 07:10 AM        Medications reviewed    Imaging reviewed    ECHO():9/12/2017Mildly reduced left ventricular systolic function.  Left ventricular ejection fraction is visually estimated to be 50 %.  Akinesis of the distal interventricular septum.  Hypokinesis/akinesis of the very distal anterior apex.  Aortic sclerosis without stenosis.  Mild aortic insufficiency.  Right heart pressures are normal.  No prior study is available for comparison    Impressions:  Ant STEMI    Recommendations:  DAPT, BB, ACEI/ARB  Will follow as outpatient in 2 wks  May d/c tomorrow if ambulates well  To quit smpking  Will follow  Thx

## 2017-09-12 NOTE — PROGRESS NOTES
[facility] Hospitalist Progress Note    Date of Service: 2017    Chief Complaint  62 y.o. male admitted 2017 with an acute STEMI requiring cardiac catheterization and stent placement.    Interval Problem Update  On room air but uses 2L at night.  Afebrile.  Up and moving.     Consultants/Specialty  Cardiology    Disposition  Home.        Review of Systems   Constitutional: Negative for fever.   Respiratory: Negative for cough and shortness of breath.    Cardiovascular: Negative for chest pain, palpitations and leg swelling.   Gastrointestinal: Negative for abdominal pain and nausea.   Musculoskeletal: Negative for back pain.   Neurological: Negative for dizziness, speech change and headaches.   Psychiatric/Behavioral: Negative for depression and substance abuse. The patient is not nervous/anxious.       Physical Exam  Laboratory/Imaging   Hemodynamics  Temp (24hrs), Av.7 °C (98 °F), Min:36.5 °C (97.7 °F), Max:36.8 °C (98.2 °F)   Temperature: 36.6 °C (97.9 °F)  Pulse  Av  Min: 57  Max: 91 Heart Rate (Monitored): 74  Blood Pressure: (!) 98/65, NIBP: 111/73      Respiratory      Respiration: 20, Pulse Oximetry: 92 %        RUL Breath Sounds: Rhonchi, RML Breath Sounds: Diminished, RLL Breath Sounds: Diminished, VENTURA Breath Sounds: Rhonchi, LLL Breath Sounds: Diminished    Fluids    Intake/Output Summary (Last 24 hours) at 17 1848  Last data filed at 17 1600   Gross per 24 hour   Intake             1600 ml   Output             3650 ml   Net            -2050 ml       Nutrition  Orders Placed This Encounter   Procedures   • Diet Order     Standing Status:   Standing     Number of Occurrences:   1     Order Specific Question:   Diet:     Answer:   Cardiac [6]     Physical Exam   Constitutional: He appears well-developed and well-nourished. No distress.   HENT:   Head: Normocephalic and atraumatic.   Nose: Nose normal.   Mouth/Throat: Oropharynx is clear and moist.   Eyes: Conjunctivae and EOM are  normal. Right eye exhibits no discharge. Left eye exhibits no discharge. No scleral icterus.   Neck: No tracheal deviation present.   Cardiovascular: Normal rate, regular rhythm, normal heart sounds and intact distal pulses.    No murmur heard.  Pulmonary/Chest: Effort normal and breath sounds normal. No respiratory distress. He has no wheezes.   Abdominal: Soft. Bowel sounds are normal. He exhibits no distension. There is no tenderness.   Musculoskeletal: He exhibits no edema.   Lymphadenopathy:     He has no cervical adenopathy.   Neurological: He is alert. No cranial nerve deficit.   Skin: Skin is warm. He is not diaphoretic.   Psychiatric: He has a normal mood and affect. His behavior is normal. Thought content normal.   Vitals reviewed.      Recent Labs      09/11/17 0710 09/12/17 0414   WBC  12.3*  10.6   RBC  4.73  5.10   HEMOGLOBIN  13.9*  15.2   HEMATOCRIT  40.7*  43.3   MCV  86.0  84.9   MCH  29.4  29.8   MCHC  34.2  35.1   RDW  40.8  40.0   PLATELETCT  198  194   MPV  10.4  10.6     Recent Labs      09/11/17   0710  09/12/17   0414   SODIUM  136  135   POTASSIUM  4.4  3.9   CHLORIDE  105  103   CO2  25  26   GLUCOSE  128*  116*   BUN  14  11   CREATININE  0.99  0.89   CALCIUM  9.0  9.6     Recent Labs      09/11/17   0710   APTT  80.2*   INR  1.06     Recent Labs      09/11/17   0710   BNPBTYPENAT  43     Recent Labs      09/12/17   0414   TRIGLYCERIDE  167*   HDL  27*   LDL  69          Assessment/Plan     * STEMI (ST elevation myocardial infarction) (CMS-[facility])   Assessment & Plan    Has been having chest pain since last night, found to have ST elevation and elevated troponin on admission. Was taken to the Cath Lab where a drug-eluting LAD stent was placed to a 100% occluded artery.  Given Angiomax in the Cath Lab  Dual antiplatelet therapy per cardiology  Coreg, Atorvastatin, lisinopril  Home tomorrow        History of congestive heart failure   Assessment & Plan    The patient doesn't know if this  is systolic or diastolic. [facility] His EF noted during the cath was 45%. He has no evidence of fluid overload  Await 9/12/2017 echocardiogram        Leukocytosis- (present on admission)   Assessment & Plan    Improving white count        Chronic back pain- (present on admission)   Assessment & Plan    He is on oxycodone at home, continue home dose        GERD (gastroesophageal reflux disease)- (present on admission)   Assessment & Plan    When necessary medications        Tobacco abuse- (present on admission)   Assessment & Plan    Cessation strongly encouraged again  Nicotine patch        COPD (chronic obstructive pulmonary disease) (CMS-HCC)- (present on admission)   Assessment & Plan    He is on 2 L of oxygen nightly, none during the day.  Bronchodilators, RT protocol. Supplemental oxygen at night  Smoking cessation strongly encouraged            Reviewed items::  Radiology images reviewed, Labs reviewed and Medications reviewed  Tay catheter::  No Tay

## 2017-09-13 VITALS
DIASTOLIC BLOOD PRESSURE: 68 MMHG | WEIGHT: 217.15 LBS | OXYGEN SATURATION: 84 % | HEIGHT: 72 IN | TEMPERATURE: 98 F | RESPIRATION RATE: 20 BRPM | BODY MASS INDEX: 29.41 KG/M2 | SYSTOLIC BLOOD PRESSURE: 112 MMHG | HEART RATE: 66 BPM

## 2017-09-13 PROBLEM — I21.3 STEMI (ST ELEVATION MYOCARDIAL INFARCTION) (HCC): Status: RESOLVED | Noted: 2017-09-11 | Resolved: 2017-09-13

## 2017-09-13 LAB
ANION GAP SERPL CALC-SCNC: 6 MMOL/L (ref 0–11.9)
BUN SERPL-MCNC: 13 MG/DL (ref 8–22)
CALCIUM SERPL-MCNC: 9.5 MG/DL (ref 8.5–10.5)
CHLORIDE SERPL-SCNC: 104 MMOL/L (ref 96–112)
CO2 SERPL-SCNC: 25 MMOL/L (ref 20–33)
CREAT SERPL-MCNC: 0.82 MG/DL (ref 0.5–1.4)
ERYTHROCYTE [DISTWIDTH] IN BLOOD BY AUTOMATED COUNT: 41 FL (ref 35.9–50)
GFR SERPL CREATININE-BSD FRML MDRD: >60 ML/MIN/1.73 M 2
GLUCOSE SERPL-MCNC: 106 MG/DL (ref 65–99)
HCT VFR BLD AUTO: 43.2 % (ref 42–52)
HGB BLD-MCNC: 15 G/DL (ref 14–18)
MCH RBC QN AUTO: 29.8 PG (ref 27–33)
MCHC RBC AUTO-ENTMCNC: 34.7 G/DL (ref 33.7–35.3)
MCV RBC AUTO: 85.7 FL (ref 81.4–97.8)
PLATELET # BLD AUTO: 177 K/UL (ref 164–446)
PMV BLD AUTO: 10.7 FL (ref 9–12.9)
POTASSIUM SERPL-SCNC: 4.3 MMOL/L (ref 3.6–5.5)
RBC # BLD AUTO: 5.04 M/UL (ref 4.7–6.1)
SODIUM SERPL-SCNC: 135 MMOL/L (ref 135–145)
WBC # BLD AUTO: 8.4 K/UL (ref 4.8–10.8)

## 2017-09-13 PROCEDURE — G8980 MOBILITY D/C STATUS: HCPCS | Mod: CI

## 2017-09-13 PROCEDURE — 700111 HCHG RX REV CODE 636 W/ 250 OVERRIDE (IP): Performed by: INTERNAL MEDICINE

## 2017-09-13 PROCEDURE — 99239 HOSP IP/OBS DSCHRG MGMT >30: CPT | Performed by: HOSPITALIST

## 2017-09-13 PROCEDURE — 700102 HCHG RX REV CODE 250 W/ 637 OVERRIDE(OP): Performed by: INTERNAL MEDICINE

## 2017-09-13 PROCEDURE — G8979 MOBILITY GOAL STATUS: HCPCS | Mod: CI

## 2017-09-13 PROCEDURE — A9270 NON-COVERED ITEM OR SERVICE: HCPCS | Performed by: INTERNAL MEDICINE

## 2017-09-13 PROCEDURE — 80048 BASIC METABOLIC PNL TOTAL CA: CPT

## 2017-09-13 PROCEDURE — 97162 PT EVAL MOD COMPLEX 30 MIN: CPT

## 2017-09-13 PROCEDURE — 97535 SELF CARE MNGMENT TRAINING: CPT

## 2017-09-13 PROCEDURE — 85027 COMPLETE CBC AUTOMATED: CPT

## 2017-09-13 PROCEDURE — G8978 MOBILITY CURRENT STATUS: HCPCS | Mod: CI

## 2017-09-13 RX ORDER — NITROGLYCERIN 0.4 MG/1
0.4 TABLET SUBLINGUAL PRN
Qty: 9 TAB | Refills: 3 | Status: SHIPPED | OUTPATIENT
Start: 2017-09-13 | End: 2018-08-14

## 2017-09-13 RX ORDER — ATORVASTATIN CALCIUM 40 MG/1
40 TABLET, FILM COATED ORAL
Qty: 30 TAB | Refills: 3 | Status: SHIPPED | OUTPATIENT
Start: 2017-09-13

## 2017-09-13 RX ORDER — ASPIRIN 81 MG/1
81 TABLET ORAL DAILY
Qty: 30 TAB | Refills: 3 | COMMUNITY
Start: 2017-09-13

## 2017-09-13 RX ORDER — CARVEDILOL 3.12 MG/1
3.12 TABLET ORAL 2 TIMES DAILY WITH MEALS
Qty: 60 TAB | Refills: 3 | Status: SHIPPED | OUTPATIENT
Start: 2017-09-13 | End: 2018-05-11

## 2017-09-13 RX ORDER — PRASUGREL 10 MG/1
10 TABLET, FILM COATED ORAL DAILY
Qty: 30 TAB | Refills: 3 | Status: SHIPPED | OUTPATIENT
Start: 2017-09-13 | End: 2018-08-14

## 2017-09-13 RX ADMIN — CARVEDILOL 3.12 MG: 3.12 TABLET, FILM COATED ORAL at 08:37

## 2017-09-13 RX ADMIN — ASPIRIN 81 MG: 81 TABLET ORAL at 08:37

## 2017-09-13 RX ADMIN — HEPARIN SODIUM 5000 UNITS: 5000 INJECTION, SOLUTION INTRAVENOUS; SUBCUTANEOUS at 05:16

## 2017-09-13 RX ADMIN — NICOTINE 21 MG: 21 PATCH, EXTENDED RELEASE TRANSDERMAL at 05:16

## 2017-09-13 RX ADMIN — OXYCODONE HYDROCHLORIDE 5 MG: 5 TABLET ORAL at 09:29

## 2017-09-13 RX ADMIN — BUDESONIDE AND FORMOTEROL FUMARATE DIHYDRATE 2 PUFF: 160; 4.5 AEROSOL RESPIRATORY (INHALATION) at 08:39

## 2017-09-13 RX ADMIN — LISINOPRIL 5 MG: 5 TABLET ORAL at 08:37

## 2017-09-13 RX ADMIN — PRASUGREL HYDROCHLORIDE 10 MG: 10 TABLET, FILM COATED ORAL at 08:37

## 2017-09-13 RX ADMIN — TIOTROPIUM BROMIDE 1 CAPSULE: 18 CAPSULE ORAL; RESPIRATORY (INHALATION) at 11:50

## 2017-09-13 ASSESSMENT — PAIN SCALES - GENERAL
PAINLEVEL_OUTOF10: 6
PAINLEVEL_OUTOF10: 5

## 2017-09-13 ASSESSMENT — COGNITIVE AND FUNCTIONAL STATUS - GENERAL
MOBILITY SCORE: 24
SUGGESTED CMS G CODE MODIFIER MOBILITY: CH

## 2017-09-13 ASSESSMENT — GAIT ASSESSMENTS
DISTANCE (FEET): 496
GAIT LEVEL OF ASSIST: SUPERVISED

## 2017-09-13 NOTE — PROGRESS NOTES
D/c instructions given to pt regarding medications and follow up appt. Educated on worsening s/s, pt understands and questions answered. Vss, home with 02, rx scripts given to pt.

## 2017-09-13 NOTE — CARE PLAN
Problem: Pain Management  Goal: Pain level will decrease to patient's comfort goal  Outcome: PROGRESSING AS EXPECTED  Pt assessed for pain Q4h and medicated PRN. Pt instructed to notify RN of any new or increasing pain to prevent it from becoming intolerable. Verbalized understanding.       Problem: Knowledge Deficit  Goal: Knowledge of disease process/condition, treatment plan, diagnostic tests, and medications will improve  Outcome: PROGRESSING AS EXPECTED  Pt educated regarding plan of care for the night, activity, diet, and medications. Verbalized understanding.

## 2017-09-13 NOTE — DISCHARGE INSTRUCTIONS
Discharge Instructions    Discharged to home by car with relative. Discharged via wheelchair, hospital escort: Yes.  Special equipment needed: Oxygen    Be sure to schedule a follow-up appointment with your primary care doctor or any specialists as instructed.     Discharge Plan:   Smoking Cessation Offered: Patient Counseled  Influenza Vaccine Indication: Indicated: 9 to 64 years of age    I understand that a diet low in cholesterol, fat, and sodium is recommended for good health. Unless I have been given specific instructions below for another diet, I accept this instruction as my diet prescription.   Other diet: Cardiac    Special Instructions: Diagnosis:  Acute Coronary Syndrome (ACS) is a diagnosis that encompasses cardiac-related chest pain and heart attack. ACS occurs when the blood flow to the heart muscle is severely reduced or cut off completely due to a slow process called atherosclerosis.  Atherosclerosis is a disease in which the coronary arteries become narrow from a buildup of fat, cholesterol, and other substances that combine to form plaque. If the plaque breaks, a blood clot will form and block the blood flow to the heart muscle. This lack of blood flow can cause damage or death to the heart muscle which is called a heart attack or Myocardial Infarction (MI). There are two different types of MIs:  ST Elevation Myocardial Infarction or STEMI (the most severe type of heart attack) and Non-ST Elevation Myocardial Infarction or NSTEMI.    Treatment Plan:  · Cardiac Diet  - Low fat, low salt, low cholesterol   · Cardiac Rehab  - Your doctor has ordered you a referral to [facility].  Call [phone] to schedule an appointment.  · Attend my follow-up appointment with my Cardiologist.  · Take my medications as prescribed by my doctor  · Exercise daily  · Quit Smoking, Lower my bad cholesterol and raise my good cholesterol, lower my blood pressure and Reduce stress    Medications:  Certain medications are  used to treat ACS.  Remember to always take medications as prescribed and never stop talking medications unless told by your doctor.    You have been prescribed the following medicatons:    Aspirin - Aspirin is used as a blood thinning medication and you will require this medication indefinitely.  Anti-platelet/blood thinner - Your Anti-platelet/Blood thinning medication is called effient, and is used in combination with aspirin to prevent clots from forming in your heart and/or around your stent.  Your doctor will determine how long you need to be on this medicine.  Beta-Blocker - Beta-Blocker coreg is used to lower blood pressure and heart rate, and/or helps your heart heal after a heart attack.  Statin - Statin atorvastatin is used to lower cholesterol.    · Is patient discharged on Warfarin / Coumadin?   No     · Is patient Post Blood Transfusion?  No    Depression / Suicide Risk    As you are discharged from this [facility] facility, it is important to learn how to keep safe from harming yourself.    Recognize the warning signs:  · Abrupt changes in personality, positive or negative- including increase in energy   · Giving away possessions  · Change in eating patterns- significant weight changes-  positive or negative  · Change in sleeping patterns- unable to sleep or sleeping all the time   · Unwillingness or inability to communicate  · Depression  · Unusual sadness, discouragement and loneliness  · Talk of wanting to die  · Neglect of personal appearance   · Rebelliousness- reckless behavior  · Withdrawal from people/activities they love  · Confusion- inability to concentrate     If you or a loved one observes any of these behaviors or has concerns about self-harm, here's what you can do:  · Talk about it- your feelings and reasons for harming yourself  · Remove any means that you might use to hurt yourself (examples: pills, rope, extension cords, firearm)  · Get professional help from the community  (Mental Health, Substance Abuse, psychological counseling)  · Do not be alone:Call your Safe Contact- someone whom you trust who will be there for you.  · Call your local CRISIS HOTLINE 946-4867 or 321-048-3667  · Call your local Children's Mobile Crisis Response Team [place] (359) 816-1166 or www.[employer] Proof  · Call the toll free National Suicide Prevention Hotlines   · National Suicide Prevention Lifeline 281-568-AQGY (3017)  · [employer]rik Line Network 800-SUICIDE (467-4955)      Coronary Angiogram With Stent  Coronary angiography with stent placement is a procedure to widen or open a narrow blood vessel of the heart (coronary artery). When a coronary artery becomes partially blocked, it decreases blood flow to that area. This may lead to chest pain or a heart attack (myocardial infarction). Arteries may become blocked by cholesterol buildup (plaque) in the lining or wall.   A stent is a small piece of metal that looks like a mesh or a spring. Stent placement may be done right after a coronary angiography in which a blocked artery is found or as a treatment for a heart attack.   LET YOUR HEALTH CARE PROVIDER KNOW ABOUT:  · Any allergies you have.    · All medicines you are taking, including vitamins, herbs, eye drops, creams, and over-the-counter medicines.    · Previous problems you or members of your family have had with the use of anesthetics.    · Any blood disorders you have.    · Previous surgeries you have had.    · Medical conditions you have.  RISKS AND COMPLICATIONS  Generally, coronary angiography with stent is a safe procedure. However, problems can occur and include:  · Damage to the heart or its blood vessels.    · A return of blockage.    · Bleeding, infection, or bruising at the insertion site.    · A collection of blood under the skin (hematoma) at the insertion site.  · Blood clot in another part of the body.    · Kidney injury.    · Allergic reaction to the dye or contrast used.     · Bleeding into the abdomen (retroperitoneal bleeding).  BEFORE THE PROCEDURE  · Do not eat or drink anything after midnight on the night before the procedure or as directed by your health care provider.   · Ask your health care provider about changing or stopping your regular medicines. This is especially important if you are taking diabetes medicines or blood thinners.  · Your health care provider will make sure you understand the procedure as well as the risks and potential problems associated with the procedure.    PROCEDURE  · You may be given a medicine to help you relax before and during the procedure (sedative). This medicine will be given through an IV tube that is put into one of your veins.    · The area where the catheter will be inserted will be shaved and cleaned. This is usually done in the groin but may be done in the fold of your arm (near your elbow) or in the wrist.     · A medicine will be given to numb the area where the catheter will be inserted (local anesthetic).    · The catheter will be inserted into an artery using a guide wire. A type of X-ray (fluoroscopy) will be used to help guide the catheter to the opening of the blocked artery.    · A dye will then be injected into the catheter, and X-rays will be taken. The dye will help to show where any narrowing or blockages are located in the heart arteries.    · A tiny wire will be guided to the blocked spot, and a balloon will be inflated to make the artery wider. The stent will be expanded and will crush the plaque into the wall of the vessel. The stent will hold the area open like a scaffolding and improve the blood flow.    · Sometimes the artery may be made wider using a laser or other tools to remove plaque.    · When the blood flow is better, the catheter will be removed. The lining of the artery will grow over the stent, which stays where it was placed.    AFTER THE PROCEDURE  · If the procedure is done through the leg, you will be  kept in bed lying flat for about 6 hours. You will be instructed to not bend or cross your legs.    · The insertion site will be checked frequently.    · The pulse in your feet or wrist will be checked frequently.    · Additional blood tests, X-rays, and electrocardiography may be done.     This information is not intended to replace advice given to you by your health care provider. Make sure you discuss any questions you have with your health care provider.     Document Released: 06/23/2004 Document Revised: 01/08/2016 Document Reviewed: 06/26/2014  [employer] Interactive Patient Education ©2016 [employer]

## 2017-09-13 NOTE — PROGRESS NOTES
Date of Service: 9/13/2017  Chief Complaint:No chief complaint on file.    Interval History:  Doing well, ambulates well and no smoking  Eager to go home  All recent medication, labs, imaging studies and procedures reviewed    Physical Exam   Blood pressure 112/65, pulse 66, temperature 36.8 °C (98.2 °F), resp. rate (!) 23, height 1.829 m (6'), weight 98.5 kg (217 lb 2.5 oz), SpO2 97 %.    Constitutional:  He appears well-developed.   HENT: Normocephalic and atraumatic. No scleral icterus.   Neck: No JVD present.   Cardiovascular: Normal rate. RRR; Exam reveals no gallop and no friction rub. No murmur heard.   Pulmonary/Chest: CTAB coarse   Abdominal: S/NT/ND BS+   Musculoskeletal: He exhibits no edema. Pulses present.  Skin: Skin is warm and dry.       Intake/Output Summary (Last 24 hours) at 09/13/17 0816  Last data filed at 09/13/17 0600   Gross per 24 hour   Intake             1000 ml   Output             2750 ml   Net            -1750 ml       LABS:  Lab Results   Component Value Date/Time    CHOLSTRLTOT 129 09/12/2017 04:14 AM    LDL 69 09/12/2017 04:14 AM    HDL 27 (A) 09/12/2017 04:14 AM    TRIGLYCERIDE 167 (H) 09/12/2017 04:14 AM       Lab Results   Component Value Date/Time    WBC 8.4 09/13/2017 04:30 AM    RBC 5.04 09/13/2017 04:30 AM    HEMOGLOBIN 15.0 09/13/2017 04:30 AM    HEMATOCRIT 43.2 09/13/2017 04:30 AM    MCV 85.7 09/13/2017 04:30 AM    NEUTSPOLYS 76.20 (H) 09/12/2017 04:14 AM    LYMPHOCYTES 15.50 (L) 09/12/2017 04:14 AM    MONOCYTES 7.00 09/12/2017 04:14 AM    EOSINOPHILS 0.60 09/12/2017 04:14 AM    BASOPHILS 0.30 09/12/2017 04:14 AM     Lab Results   Component Value Date/Time    SODIUM 135 09/13/2017 04:30 AM    POTASSIUM 4.3 09/13/2017 04:30 AM    CHLORIDE 104 09/13/2017 04:30 AM    CO2 25 09/13/2017 04:30 AM    GLUCOSE 106 (H) 09/13/2017 04:30 AM    BUN 13 09/13/2017 04:30 AM    CREATININE 0.82 09/13/2017 04:30 AM         Lab Results   Component Value Date/Time    ALKPHOSPHAT 51 09/11/2017  07:10 AM    ASTSGOT 64 (H) 09/11/2017 07:10 AM    ALTSGPT 20 09/11/2017 07:10 AM    TBILIRUBIN 0.6 09/11/2017 07:10 AM      Lab Results   Component Value Date/Time    BNPBTYPENAT 43 09/11/2017 07:10 AM      No results found for: TSH  Lab Results   Component Value Date/Time    PROTHROMBTM 14.1 09/11/2017 07:10 AM    INR 1.06 09/11/2017 07:10 AM        Medications reviewed    Imaging reviewed    ECHO():9/12/2017Mildly reduced left ventricular systolic function.  Left ventricular ejection fraction is visually estimated to be 50 %.  Akinesis of the distal interventricular septum.  Hypokinesis/akinesis of the very distal anterior apex.  Aortic sclerosis without stenosis.  Mild aortic insufficiency.  Right heart pressures are normal.  No prior study is available for comparison     Impressions:  Ant STEMI     Recommendations:  DAPT, BB, ACEI/ARB  Will follow as outpatient in 2 wks  May d/c today,  ambulates well  To quit smpking  Will follow as outpatient  Thx

## 2017-09-13 NOTE — DISCHARGE PLANNING
Patient has medical clearance for home to [place] with spouse.  [facility] is [facility] [place].  Apparently new scripts take a week to process through [facility] to [facility] and Back.  Effient script received and discussed with patient and his spouse,. Instructed them to head straight over to [facility] at ER and request this script be re-written and filled prior to DC home.  [facility]L [facility] through [facility].  [facility].      Called [facility] and requested tank for home.  [facility] to deliver to T6 bedside  Today.  Hospitalist and BSN notified.

## 2017-09-13 NOTE — PROGRESS NOTES
Assumed care at 1915. Bedside report received from Day RN Yara. Patient's chart and MAR reviewed. 12 hour chart check complete. Pt is awake in bed. Family at bedside. Patient was updated on plan of care for the night. Questions answered and concerns addressed.  Pt denies any additional needs at this time. White board updated. Call light, phone and personal belongings within reach. Vital signs stable

## 2017-09-13 NOTE — THERAPY
"Physical Therapy Evaluation completed.   Bed Mobility:  Supine to Sit: Modified Independent  Transfers: Sit to Stand: Supervised  Gait: Level Of Assist: Supervised (2/2 hemodynamic eavl) with No Equipment Needed       Plan of Care: Patient with no further skilled PT needs in the acute care setting at this time  Discharge Recommendations: Equipment: No Equipment Needed.     Pt presents with impaired activity tolerance and hypoxia s/p STEMI. Pt is most limited by oxygen need with activity, down to 84% on RA with about 2 mins ambulation; does have a concentrator at home but no portable tank. Was able to recover to 92% on RA at rest; educated, pt reporting he will not comply with 02 during activity; educated and encouraged to by a pulse ox and begin walking program in home on 02. Pt reporting distance and finances as a barrier to follow up with outpt cardiac rehab but appears to internalize education on modifiable cardiac risk factors well. No further needs at this time, BP/HR remaining in goals during activity. Functional ready for d/c when medically appropriate.     See \"Rehab Therapy-Acute\" Patient Summary Report for complete documentation.     "

## 2017-09-14 NOTE — DISCHARGE SUMMARY
CHIEF COMPLAINT ON ADMISSION  Chest pain    CODE STATUS  Full code    HPI & HOSPITAL COURSE  This is a 62 y.o. male here with exertional chest pain. Workup was concerning for non-ST elevated myocardial infarction. Patient was seen by cardiology and had cardiac catheterization with stent placed in the proximal left anterior descending with a 4.0 x 20 mm drug-eluting [employer] energy stent on September 11, 2017. Patient is an ongoing tobacco smoker I recommend cigarette cessation with he and his wife spent 10 minutes discussing cessation risk and benefits of stopping smoking and ongoing smoking. Recommend ongoing healthy balanced diet with more of a plant-based diet/Mediterranean diet discussed with them, gave them information on healthy eating. I reviewed the patient's medications with him and his current blood pressure monitoring at home he has a blood pressure cuff. Recommended ongoing monitoring of oxygen levels with a pulse oximeter. Patient was set up with oxygen for discharge home as well.    Therefore, he is discharged in good and stable condition with close outpatient follow-up.    SPECIFIC OUTPATIENT FOLLOW-UP  Cardiology in 7-10 days appointment scheduled for 9/25/17 with Dr. Roberto Salazar  Follow-up with [facility] primary care provider in the next 2 weeks    DISCHARGE PROBLEM LIST  Principal Problem (Resolved):    STEMI (ST elevation myocardial infarction) (CMS-[facility]) POA: Unknown  Active Problems:    COPD (chronic obstructive pulmonary disease) (CMS-[facility]) POA: Yes    Tobacco abuse POA: Yes    GERD (gastroesophageal reflux disease) POA: Yes    Chronic back pain POA: Yes    History of congestive heart failure POA: Unknown  Resolved Problems:    Leukocytosis POA: Yes      FOLLOW UP  Future Appointments  Date Time Provider Department Center   9/25/2017 2:40 PM Bart Salazar M.D. [facility] None     Skinny Garcia M.D.  [address] [place] [id number]  [phone]    In 2  weeks        MEDICATIONS ON DISCHARGE   Migue Cervantes   Home Medication Instructions [id number]    Printed on:09/14/17 0738   Medication Information                      albuterol (VENTOLIN OR PROVENTIL) 108 (90 BASE) MCG/ACT AERS  Inhale 2 Puffs by mouth every 6 hours as needed for Shortness of Breath.             aspirin EC 81 MG EC tablet  Take 1 Tab by mouth every day.             atorvastatin (LIPITOR) 40 MG Tab  Take 1 Tab by mouth every bedtime.             carvedilol (COREG) 3.125 MG Tab  Take 1 Tab by mouth 2 times a day, with meals.             lactobacillus granules (LACTINEX/FLORANEX) PACK  Take 1 Packet by mouth 3 times a day, with meals.             lisinopril (PRINIVIL) 10 MG TABS  Take 10 mg by mouth every day.             nitroglycerin (NITROSTAT) 0.4 MG SL Tab  Place 1 Tab under tongue as needed for Chest Pain (up to 3 doses (if SBP greater than 90 mmHg)).             oxycodone immediate-release (ROXICODONE) 5 MG Tab  Take 1 Tab by mouth every four hours as needed for Severe Pain.             prasugrel (EFFIENT) 10 MG Tab  Take 1 Tab by mouth every day.                 DIET  No orders of the defined types were placed in this encounter.      ACTIVITY  As tolerated.        CONSULTATIONS  [facility] cardiologist    PROCEDURES  Cardiac catheterization on 9/11/17    LABORATORY  Lab Results   Component Value Date/Time    SODIUM 135 09/13/2017 04:30 AM    POTASSIUM 4.3 09/13/2017 04:30 AM    CHLORIDE 104 09/13/2017 04:30 AM    CO2 25 09/13/2017 04:30 AM    GLUCOSE 106 (H) 09/13/2017 04:30 AM    BUN 13 09/13/2017 04:30 AM    CREATININE 0.82 09/13/2017 04:30 AM        Lab Results   Component Value Date/Time    WBC 8.4 09/13/2017 04:30 AM    HEMOGLOBIN 15.0 09/13/2017 04:30 AM    HEMATOCRIT 43.2 09/13/2017 04:30 AM    PLATELETCT 177 09/13/2017 04:30 AM        Total time of the discharge process exceeds 38 minutes

## 2017-09-25 ENCOUNTER — OFFICE VISIT (OUTPATIENT)
Dept: CARDIOLOGY | Facility: PHYSICIAN GROUP | Age: 62
End: 2017-09-25
Payer: MEDICARE

## 2017-09-25 VITALS
HEART RATE: 75 BPM | WEIGHT: 227 LBS | SYSTOLIC BLOOD PRESSURE: 108 MMHG | OXYGEN SATURATION: 92 % | DIASTOLIC BLOOD PRESSURE: 64 MMHG | HEIGHT: 72 IN | BODY MASS INDEX: 30.75 KG/M2

## 2017-09-25 DIAGNOSIS — E78.5 DYSLIPIDEMIA: Chronic | ICD-10-CM

## 2017-09-25 DIAGNOSIS — I25.2 HISTORY OF ST ELEVATION MYOCARDIAL INFARCTION (STEMI): Chronic | ICD-10-CM

## 2017-09-25 DIAGNOSIS — I25.10 CORONARY ARTERY DISEASE DUE TO LIPID RICH PLAQUE: Chronic | ICD-10-CM

## 2017-09-25 DIAGNOSIS — I25.83 CORONARY ARTERY DISEASE DUE TO LIPID RICH PLAQUE: Chronic | ICD-10-CM

## 2017-09-25 DIAGNOSIS — I10 ESSENTIAL HYPERTENSION: Chronic | ICD-10-CM

## 2017-09-25 DIAGNOSIS — Z95.5 STATUS POST INSERTION OF DRUG-ELUTING STENT INTO LEFT ANTERIOR DESCENDING ARTERY: Chronic | ICD-10-CM

## 2017-09-25 PROCEDURE — 99214 OFFICE O/P EST MOD 30 MIN: CPT | Performed by: INTERNAL MEDICINE

## 2017-09-25 ASSESSMENT — ENCOUNTER SYMPTOMS
SHORTNESS OF BREATH: 0
PALPITATIONS: 0
ABDOMINAL PAIN: 0
CHILLS: 0
BRUISES/BLEEDS EASILY: 0
PND: 0
CLAUDICATION: 0
SORE THROAT: 0
BLURRED VISION: 1
COUGH: 0
WEAKNESS: 0
NAUSEA: 0
FEVER: 0
FOCAL WEAKNESS: 0
FALLS: 0
DIZZINESS: 0

## 2017-09-25 NOTE — LETTER
[facility] [address]  Phone: [phone]  Fax: [phone]              Migue Cervantes  1955    Encounter Date: 9/25/2017    Bart Salazar M.D.          PROGRESS NOTE:  Subjective:   Migue Cervantes is a 62 y.o. male who presents today for follow-up of his recent presentation for STEMI with stenting to the left anterior descending    He has done well since hospitalization he is only noticed on occas vision apparently he was taking both 40 and 10 mg of lisinopril    He was able to fill his medications including the prasrugrel       He stopped smoking with the use of nicotine patch    Past Medical History:   Diagnosis Date   • Pain 09-20-12    lower back, 8/10   • Coronary artery disease due to lipid rich plaque    • Dyslipidemia    • Essential hypertension    • Former tobacco use - quit Sep 2017 in setting of STEMI    • Heart burn    • History of ST elevation myocardial infarction (STEMI) LAD September 2017    • Indigestion    • Status post insertion of drug-eluting stent into left anterior descending artery      Past Surgical History:   Procedure Laterality Date   • MARQUISE BY LAPAROSCOPY N/A 12/28/2015    Procedure: MARQUISE BY LAPAROSCOPY- With Grams ;  Surgeon: Sandro Obregon M.D.;  Location: SURGERY [facility];  Service:    • LUMBAR DECOMPRESSION  9/25/2012    by Dr. Plata   • LUMBAR FUSION POSTERIOR  9/25/2012    Performed by Mckayla Plata M.D. at SURGERY [facility]   • LUMBAR DECOMPRESSION  9/25/2012    Performed by Mckayla Plata M.D. at SURGERY [facility]   • CERVICAL DISK AND FUSION ANTERIOR  1997/2010    x 2     Family History   Problem Relation Age of Onset   • Heart Attack Father    • Stroke Father    • Heart Attack Paternal Grandmother    • Heart Attack Paternal Grandfather      History   Smoking Status   • Current Every Day Smoker   • Packs/day: 2.00   • Years: 35.00   • Types:  Cigarettes   Smokeless Tobacco   • Never Used     Allergies   Allergen Reactions   • Nkda [No Known Drug Allergy]      Outpatient Encounter Prescriptions as of 9/25/2017   Medication Sig Dispense Refill   • aspirin EC 81 MG EC tablet Take 1 Tab by mouth every day. 30 Tab 3   • nitroglycerin (NITROSTAT) 0.4 MG SL Tab Place 1 Tab under tongue as needed for Chest Pain (up to 3 doses (if SBP greater than 90 mmHg)). 9 Tab 3   • atorvastatin (LIPITOR) 40 MG Tab Take 1 Tab by mouth every bedtime. 30 Tab 3   • carvedilol (COREG) 3.125 MG Tab Take 1 Tab by mouth 2 times a day, with meals. 60 Tab 3   • prasugrel (EFFIENT) 10 MG Tab Take 1 Tab by mouth every day. 30 Tab 3   • oxycodone immediate-release (ROXICODONE) 5 MG Tab Take 1 Tab by mouth every four hours as needed for Severe Pain. 30 Tab 0   • lactobacillus granules (LACTINEX/FLORANEX) PACK Take 1 Packet by mouth 3 times a day, with meals. 30 Packet 0   • albuterol (VENTOLIN OR PROVENTIL) 108 (90 BASE) MCG/ACT AERS Inhale 2 Puffs by mouth every 6 hours as needed for Shortness of Breath. 8.5 g 3   • lisinopril (PRINIVIL) 10 MG TABS Take 10 mg by mouth every day.       No facility-administered encounter medications on file as of 9/25/2017.      Review of Systems   Constitutional: Negative for chills and fever.   HENT: Negative for sore throat.    Eyes: Positive for blurred vision.   Respiratory: Negative for cough and shortness of breath.    Cardiovascular: Negative for chest pain, palpitations, claudication, leg swelling and PND.   Gastrointestinal: Negative for abdominal pain and nausea.   Musculoskeletal: Negative for falls and joint pain.   Skin: Negative for rash.   Neurological: Negative for dizziness, focal weakness and weakness.   Endo/Heme/Allergies: Does not bruise/bleed easily.        Objective:   /64   Pulse 75   Ht 1.829 m (6')   Wt 103 kg (227 lb)   SpO2 92%   BMI 30.79 kg/m²      Physical Exam   Constitutional: No distress.   HENT:      Mouth/Throat: Oropharynx is clear and moist.   Eyes: No scleral icterus.   Neck: Neck supple. No JVD present.   Cardiovascular: Normal rate, regular rhythm, normal heart sounds and intact distal pulses.  Exam reveals no gallop and no friction rub.    No murmur heard.  Pulmonary/Chest: Effort normal. He has no rales.   Abdominal: Soft. Bowel sounds are normal. There is no tenderness.   Musculoskeletal: He exhibits no edema.   Neurological: He is alert.   Skin: No rash noted. He is not diaphoretic.   Psychiatric: He has a normal mood and affect.       Assessment:     1. Status post insertion of drug-eluting stent into left anterior descending artery         Medical Decision Making:  Today's Assessment / Status / Plan:     It was my pleasure to meet with Mr. Cervantes.    For his coronary disease he is on appropriate therapy I asked him to reduce lisinopril likely 20 mg daily and keep his blood pressure between 120 and 130    He was prescribed Prasrugrel given stenting to the proximal left anterior descending artery.  He will continue this for a with aspirin 81 and then likely switched to Plavix for an additional 2 years.    I congratulated him on stop smoking      I will see Mr. Cervantes back in 6 months time and encouraged him to follow up with us over the phone or e-mail using my MyChart as issues arise.    It is my pleasure to participate in the care of Mr. Cervantes.  Please do not hesitate to contact me with questions or concerns.    Bart Salazar MD PhD [facility]  Cardiologist [facility]        Skinny Garcia M.D.  [address] [place] [id number]  VIA Facsimile: [phone]

## 2017-09-25 NOTE — PROGRESS NOTES
Subjective:   Migue Cervantes is a 62 y.o. male who presents today for follow-up of his recent presentation for STEMI with stenting to the left anterior descending    He has done well since hospitalization he is only noticed on occas vision apparently he was taking both 40 and 10 mg of lisinopril    He was able to fill his medications including the prasrugrel       He stopped smoking with the use of nicotine patch    Past Medical History:   Diagnosis Date   • Pain 09-20-12    lower back, 8/10   • Coronary artery disease due to lipid rich plaque    • Dyslipidemia    • Essential hypertension    • Former tobacco use - quit Sep 2017 in setting of STEMI    • Heart burn    • History of ST elevation myocardial infarction (STEMI) LAD September 2017    • Indigestion    • Status post insertion of drug-eluting stent into left anterior descending artery      Past Surgical History:   Procedure Laterality Date   • MARQUISE BY LAPAROSCOPY N/A 12/28/2015    Procedure: MARQUISE BY LAPAROSCOPY- With Grams ;  Surgeon: Sandro Obregon M.D.;  Location: SURGERY [facility];  Service:    • LUMBAR DECOMPRESSION  9/25/2012    by Dr. Plata   • LUMBAR FUSION POSTERIOR  9/25/2012    Performed by Mckayla Plata M.D. at SURGERY [facility]   • LUMBAR DECOMPRESSION  9/25/2012    Performed by Mckayla Plata M.D. at SURGERY [facility]   • CERVICAL DISK AND FUSION ANTERIOR  1997/2010    x 2     Family History   Problem Relation Age of Onset   • Heart Attack Father    • Stroke Father    • Heart Attack Paternal Grandmother    • Heart Attack Paternal Grandfather      History   Smoking Status   • Current Every Day Smoker   • Packs/day: 2.00   • Years: 35.00   • Types: Cigarettes   Smokeless Tobacco   • Never Used     Allergies   Allergen Reactions   • Nkda [No Known Drug Allergy]      Outpatient Encounter Prescriptions as of 9/25/2017   Medication Sig Dispense Refill   • aspirin EC 81 MG EC tablet Take 1 Tab by mouth every day. 30 Tab 3   •  nitroglycerin (NITROSTAT) 0.4 MG SL Tab Place 1 Tab under tongue as needed for Chest Pain (up to 3 doses (if SBP greater than 90 mmHg)). 9 Tab 3   • atorvastatin (LIPITOR) 40 MG Tab Take 1 Tab by mouth every bedtime. 30 Tab 3   • carvedilol (COREG) 3.125 MG Tab Take 1 Tab by mouth 2 times a day, with meals. 60 Tab 3   • prasugrel (EFFIENT) 10 MG Tab Take 1 Tab by mouth every day. 30 Tab 3   • oxycodone immediate-release (ROXICODONE) 5 MG Tab Take 1 Tab by mouth every four hours as needed for Severe Pain. 30 Tab 0   • lactobacillus granules (LACTINEX/FLORANEX) PACK Take 1 Packet by mouth 3 times a day, with meals. 30 Packet 0   • albuterol (VENTOLIN OR PROVENTIL) 108 (90 BASE) MCG/ACT AERS Inhale 2 Puffs by mouth every 6 hours as needed for Shortness of Breath. 8.5 g 3   • lisinopril (PRINIVIL) 10 MG TABS Take 10 mg by mouth every day.       No facility-administered encounter medications on file as of 9/25/2017.      Review of Systems   Constitutional: Negative for chills and fever.   HENT: Negative for sore throat.    Eyes: Positive for blurred vision.   Respiratory: Negative for cough and shortness of breath.    Cardiovascular: Negative for chest pain, palpitations, claudication, leg swelling and PND.   Gastrointestinal: Negative for abdominal pain and nausea.   Musculoskeletal: Negative for falls and joint pain.   Skin: Negative for rash.   Neurological: Negative for dizziness, focal weakness and weakness.   Endo/Heme/Allergies: Does not bruise/bleed easily.        Objective:   /64   Pulse 75   Ht 1.829 m (6')   Wt 103 kg (227 lb)   SpO2 92%   BMI 30.79 kg/m²     Physical Exam   Constitutional: No distress.   HENT:   Mouth/Throat: Oropharynx is clear and moist.   Eyes: No scleral icterus.   Neck: Neck supple. No JVD present.   Cardiovascular: Normal rate, regular rhythm, normal heart sounds and intact distal pulses.  Exam reveals no gallop and no friction rub.    No murmur heard.  Pulmonary/Chest:  Effort normal. He has no rales.   Abdominal: Soft. Bowel sounds are normal. There is no tenderness.   Musculoskeletal: He exhibits no edema.   Neurological: He is alert.   Skin: No rash noted. He is not diaphoretic.   Psychiatric: He has a normal mood and affect.       Assessment:     1. Status post insertion of drug-eluting stent into left anterior descending artery         Medical Decision Making:  Today's Assessment / Status / Plan:     It was my pleasure to meet with Mr. Cervantes.    For his coronary disease he is on appropriate therapy I asked him to reduce lisinopril likely 20 mg daily and keep his blood pressure between 120 and 130    He was prescribed Prasrugrel given stenting to the proximal left anterior descending artery.  He will continue this for a with aspirin 81 and then likely switched to Plavix for an additional 2 years.    I congratulated him on stop smoking      I will see Mr. Cervantes back in 6 months time and encouraged him to follow up with us over the phone or e-mail using my MyChart as issues arise.    It is my pleasure to participate in the care of Mr. Cervantes.  Please do not hesitate to contact me with questions or concerns.    Bart Salazar MD PhD FAC  Cardiologist [facility]

## 2017-10-09 ENCOUNTER — TELEPHONE (OUTPATIENT)
Dept: CARDIOLOGY | Facility: MEDICAL CENTER | Age: 62
End: 2017-10-09

## 2017-10-09 NOTE — TELEPHONE ENCOUNTER
S/W Pt and he reports that since his change of medication to Lisinopril 20 mg daily his BP has been in the 150s/90. He also noticed swelling in his ankles and he took a HCTZ 25 mg pill and it helped. PT wants to start taking his previous dose of lisinopril 40 mg D and possibly HCTZ for the swelling PRN?    To June Ruiz RN      ----- Message from Norma Valerio sent at 10/9/2017  3:22 PM PDT -----  Regarding: elevated blood pressure and swelling  JOSE/June      Patient said his blood pressure has been elevated the last 2 days, and he's noticed fluid retention on his ankles and the top of his feet. He can be reached at [phone]

## 2017-10-10 ENCOUNTER — TELEPHONE (OUTPATIENT)
Dept: CARDIOLOGY | Facility: MEDICAL CENTER | Age: 62
End: 2017-10-10

## 2017-10-10 DIAGNOSIS — Z79.899 ENCOUNTER FOR MONITORING DIURETIC THERAPY: ICD-10-CM

## 2017-10-10 DIAGNOSIS — I10 ESSENTIAL HYPERTENSION: Chronic | ICD-10-CM

## 2017-10-10 DIAGNOSIS — Z51.81 ENCOUNTER FOR MONITORING DIURETIC THERAPY: ICD-10-CM

## 2017-10-10 DIAGNOSIS — R60.9 EDEMA, UNSPECIFIED TYPE: ICD-10-CM

## 2017-10-10 RX ORDER — LISINOPRIL 40 MG/1
20 TABLET ORAL DAILY
Qty: 30 TAB | Refills: 11 | COMMUNITY
Start: 2017-10-10 | End: 2018-05-11

## 2017-10-10 RX ORDER — HYDROCHLOROTHIAZIDE 25 MG/1
25 TABLET ORAL DAILY
Qty: 30 TAB | Refills: 11 | COMMUNITY
Start: 2017-10-10 | End: 2018-04-24

## 2017-10-10 NOTE — TELEPHONE ENCOUNTER
Sure 40 of lisinorpril and HCTZ 25 get nonfasting chem in a week to make sure labs are good     Thank you (Routing comment)       June Garcia R.N.   You; Bart Salazar M.D. 17 hours ago (4:53 PM)     Dr. Salazar Pt called to report that since his change of medication to Lisinopril 20 mg daily his BP has been in the 150s/90. He also noticed swelling in his ankles and he took a HCTZ 25 mg pill and it helped. PT wants to start taking his previous dose of lisinopril 40 mg D and possibly HCTZ for the swelling PRN?   Please advise,   June RENEE (Routing comment)       June Garcia R.N. 17 hours ago (4:47 PM)     S/W Pt and he reports that since his change of medication to Lisinopril 20 mg daily his BP has been in the 150s/90. He also noticed swelling in his ankles and he took a HCTZ 25 mg pill and it helped. PT wants to start taking his previous dose of lisinopril 40 mg D and possibly HCTZ for the swelling PRN?     To June Ruiz RN       ----- Message from Norma Valerio sent at 10/9/2017  3:22 PM PDT -----  Regarding: elevated blood pressure and swelling  JOSE/June        Patient said his blood pressure has been elevated the last 2 days, and he's noticed fluid retention on his ankles and the top of his feet. He can be reached at [phone]        Documentation      Patient informed, lab order mailed, med list updated.

## 2017-11-20 ENCOUNTER — OFFICE VISIT (OUTPATIENT)
Dept: CARDIOLOGY | Facility: PHYSICIAN GROUP | Age: 62
End: 2017-11-20
Payer: MEDICARE

## 2017-11-20 VITALS
SYSTOLIC BLOOD PRESSURE: 112 MMHG | OXYGEN SATURATION: 92 % | DIASTOLIC BLOOD PRESSURE: 68 MMHG | BODY MASS INDEX: 30.88 KG/M2 | HEIGHT: 72 IN | HEART RATE: 73 BPM | WEIGHT: 228 LBS

## 2017-11-20 DIAGNOSIS — Z87.891 FORMER TOBACCO USE: Chronic | ICD-10-CM

## 2017-11-20 DIAGNOSIS — I25.10 CORONARY ARTERY DISEASE DUE TO LIPID RICH PLAQUE: Chronic | ICD-10-CM

## 2017-11-20 DIAGNOSIS — I25.83 CORONARY ARTERY DISEASE DUE TO LIPID RICH PLAQUE: Chronic | ICD-10-CM

## 2017-11-20 DIAGNOSIS — Z95.5 STATUS POST INSERTION OF DRUG-ELUTING STENT INTO LEFT ANTERIOR DESCENDING ARTERY: Chronic | ICD-10-CM

## 2017-11-20 DIAGNOSIS — I25.2 HISTORY OF ST ELEVATION MYOCARDIAL INFARCTION (STEMI): Chronic | ICD-10-CM

## 2017-11-20 PROCEDURE — 99214 OFFICE O/P EST MOD 30 MIN: CPT | Performed by: INTERNAL MEDICINE

## 2017-11-20 ASSESSMENT — ENCOUNTER SYMPTOMS
MYALGIAS: 0
SHORTNESS OF BREATH: 0
LOSS OF CONSCIOUSNESS: 0
DIZZINESS: 0
BLURRED VISION: 0
HEARTBURN: 0
BRUISES/BLEEDS EASILY: 0
COUGH: 0
INSOMNIA: 0
DOUBLE VISION: 0
NERVOUS/ANXIOUS: 0
PALPITATIONS: 0
NAUSEA: 0
PND: 0
WEIGHT LOSS: 0

## 2017-11-20 NOTE — LETTER
[facility] [address]  Phone: [phone]  Fax: [phone]              Migue Cervantes  1955    Encounter Date: 11/20/2017    Marlee Cross M.D.          PROGRESS NOTE:  Subjective:   Migue Cervantes is a 62 y.o. male who presents today 6w after seeing Dr Salazar, after his STEMI and LAD stenting Set 2017    Feels well  Still not smoking  All meds as directed - worries b/c [facility]    Past Medical History:   Diagnosis Date   • Coronary artery disease due to lipid rich plaque    • Dyslipidemia    • Essential hypertension    • Former tobacco use - quit Sep 2017 in setting of STEMI    • Heart burn    • History of ST elevation myocardial infarction (STEMI) LAD September 2017    • Indigestion    • Pain 09-20-12    lower back, 8/10   • Status post insertion of drug-eluting stent into left anterior descending artery      Past Surgical History:   Procedure Laterality Date   • MARQUISE BY LAPAROSCOPY N/A 12/28/2015    Procedure: MARQUISE BY LAPAROSCOPY- With Grams ;  Surgeon: Sandro Obregon M.D.;  Location: SURGERY [facility];  Service:    • LUMBAR DECOMPRESSION  9/25/2012    by Dr. Plata   • LUMBAR FUSION POSTERIOR  9/25/2012    Performed by Mckayla Plata M.D. at SURGERY [facility]   • LUMBAR DECOMPRESSION  9/25/2012    Performed by Mckayla Plata M.D. at SURGERY [facility]   • CERVICAL DISK AND FUSION ANTERIOR  1997/2010    x 2     Family History   Problem Relation Age of Onset   • Heart Attack Father    • Stroke Father    • Heart Attack Paternal Grandmother    • Heart Attack Paternal Grandfather      History   Smoking Status   • Former Smoker   • Packs/day: 2.00   • Years: 35.00   • Types: Cigarettes   • Quit date: 9/11/2017   Smokeless Tobacco   • Never Used     Allergies   Allergen Reactions   • Nkda [No Known Drug Allergy]      Outpatient Encounter Prescriptions as of 11/20/2017    Medication Sig Dispense Refill   • lisinopril (PRINIVIL, ZESTRIL) 40 MG tablet Take 20 mg by mouth every day. 30 Tab 11   • hydrochlorothiazide (HYDRODIURIL) 25 MG Tab Take 1 Tab by mouth every day. 30 Tab 11   • aspirin EC 81 MG EC tablet Take 1 Tab by mouth every day. 30 Tab 3   • nitroglycerin (NITROSTAT) 0.4 MG SL Tab Place 1 Tab under tongue as needed for Chest Pain (up to 3 doses (if SBP greater than 90 mmHg)). 9 Tab 3   • atorvastatin (LIPITOR) 40 MG Tab Take 1 Tab by mouth every bedtime. 30 Tab 3   • carvedilol (COREG) 3.125 MG Tab Take 1 Tab by mouth 2 times a day, with meals. 60 Tab 3   • prasugrel (EFFIENT) 10 MG Tab Take 1 Tab by mouth every day. 30 Tab 3   • oxycodone immediate-release (ROXICODONE) 5 MG Tab Take 1 Tab by mouth every four hours as needed for Severe Pain. 30 Tab 0   • lactobacillus granules (LACTINEX/FLORANEX) PACK Take 1 Packet by mouth 3 times a day, with meals. 30 Packet 0   • albuterol (VENTOLIN OR PROVENTIL) 108 (90 BASE) MCG/ACT AERS Inhale 2 Puffs by mouth every 6 hours as needed for Shortness of Breath. 8.5 g 3     No facility-administered encounter medications on file as of 11/20/2017.      Review of Systems   Constitutional: Negative for malaise/fatigue and weight loss.   HENT: Negative for hearing loss and tinnitus.    Eyes: Negative for blurred vision and double vision.   Respiratory: Negative for cough and shortness of breath.    Cardiovascular: Negative for chest pain, palpitations and PND.   Gastrointestinal: Negative for heartburn and nausea.   Musculoskeletal: Negative for myalgias.   Neurological: Negative for dizziness and loss of consciousness.   Endo/Heme/Allergies: Does not bruise/bleed easily.   Psychiatric/Behavioral: The patient is not nervous/anxious and does not have insomnia.    All other systems reviewed and are negative.       Objective:   /68   Pulse 73   Ht 1.829 m (6')   Wt 103.4 kg (228 lb)   SpO2 92%   BMI 30.92 kg/m²      Physical Exam      Constitutional: He is oriented to person, place, and time. He appears well-developed and well-nourished.   HENT:   Head: Normocephalic and atraumatic.   Eyes: EOM are normal. Pupils are equal, round, and reactive to light.   Neck: No JVD present.   Cardiovascular: Normal rate, regular rhythm and intact distal pulses.    No murmur heard.  Pulmonary/Chest: Breath sounds normal. No respiratory distress.   Abdominal: Bowel sounds are normal. He exhibits no distension.   Musculoskeletal: He exhibits no edema or tenderness.   Neurological: He is alert and oriented to person, place, and time. Coordination normal.   Skin: Skin is warm and dry. No rash noted.   Psychiatric: He has a normal mood and affect. His behavior is normal.       Assessment:     1. Coronary artery disease due to lipid rich plaque     2. History of ST elevation myocardial infarction (STEMI) LAD September 2017     3. Status post insertion of drug-eluting stent into left anterior descending artery     4. Former tobacco use - quit Sep 2017 in setting of STEMI         Medical Decision Making:  Today's Assessment / Status / Plan:       Charts reviewed at length    CAD/PCI  EF 50 on echo  meds as is - Im not so sure he';ll need longterm ACEI and BB as his BP is running low  Discussed  DAPT, then lifelong plavix & asa per newer guidelines if tolerated  Plavix if effient not affordable - discussed  Statin  Labs in 6m    CHF  Mild  Echo 2018  meds as above    Congratulated on smoking cessation  RTC 6m, not sure why he was scheduled today        Skinny Garcia M.D.  [address] [place] [id number]  VIA Facsimile: [phone]

## 2017-11-20 NOTE — PROGRESS NOTES
Subjective:   Migue Cervantes is a 62 y.o. male who presents today 6w after seeing Dr Salazar, after his STEMI and LAD stenting Set 2017    Feels well  Still not smoking  All meds as directed - worries b/c [facility]    Past Medical History:   Diagnosis Date   • Coronary artery disease due to lipid rich plaque    • Dyslipidemia    • Essential hypertension    • Former tobacco use - quit Sep 2017 in setting of STEMI    • Heart burn    • History of ST elevation myocardial infarction (STEMI) LAD September 2017    • Indigestion    • Pain 09-20-12    lower back, 8/10   • Status post insertion of drug-eluting stent into left anterior descending artery      Past Surgical History:   Procedure Laterality Date   • MARQUISE BY LAPAROSCOPY N/A 12/28/2015    Procedure: MARQUISE BY LAPAROSCOPY- With Grams ;  Surgeon: Sandro Obregon M.D.;  Location: SURGERY [facility];  Service:    • LUMBAR DECOMPRESSION  9/25/2012    by Dr. Plata   • LUMBAR FUSION POSTERIOR  9/25/2012    Performed by Mckayla Plata M.D. at SURGERY [facility]   • LUMBAR DECOMPRESSION  9/25/2012    Performed by Mckayla Plata M.D. at SURGERY [facility]   • CERVICAL DISK AND FUSION ANTERIOR  1997/2010    x 2     Family History   Problem Relation Age of Onset   • Heart Attack Father    • Stroke Father    • Heart Attack Paternal Grandmother    • Heart Attack Paternal Grandfather      History   Smoking Status   • Former Smoker   • Packs/day: 2.00   • Years: 35.00   • Types: Cigarettes   • Quit date: 9/11/2017   Smokeless Tobacco   • Never Used     Allergies   Allergen Reactions   • Nkda [No Known Drug Allergy]      Outpatient Encounter Prescriptions as of 11/20/2017   Medication Sig Dispense Refill   • lisinopril (PRINIVIL, ZESTRIL) 40 MG tablet Take 20 mg by mouth every day. 30 Tab 11   • hydrochlorothiazide (HYDRODIURIL) 25 MG Tab Take 1 Tab by mouth every day. 30 Tab 11   • aspirin EC 81 MG EC tablet Take 1 Tab by mouth every day.  30 Tab 3   • nitroglycerin (NITROSTAT) 0.4 MG SL Tab Place 1 Tab under tongue as needed for Chest Pain (up to 3 doses (if SBP greater than 90 mmHg)). 9 Tab 3   • atorvastatin (LIPITOR) 40 MG Tab Take 1 Tab by mouth every bedtime. 30 Tab 3   • carvedilol (COREG) 3.125 MG Tab Take 1 Tab by mouth 2 times a day, with meals. 60 Tab 3   • prasugrel (EFFIENT) 10 MG Tab Take 1 Tab by mouth every day. 30 Tab 3   • oxycodone immediate-release (ROXICODONE) 5 MG Tab Take 1 Tab by mouth every four hours as needed for Severe Pain. 30 Tab 0   • lactobacillus granules (LACTINEX/FLORANEX) PACK Take 1 Packet by mouth 3 times a day, with meals. 30 Packet 0   • albuterol (VENTOLIN OR PROVENTIL) 108 (90 BASE) MCG/ACT AERS Inhale 2 Puffs by mouth every 6 hours as needed for Shortness of Breath. 8.5 g 3     No facility-administered encounter medications on file as of 11/20/2017.      Review of Systems   Constitutional: Negative for malaise/fatigue and weight loss.   HENT: Negative for hearing loss and tinnitus.    Eyes: Negative for blurred vision and double vision.   Respiratory: Negative for cough and shortness of breath.    Cardiovascular: Negative for chest pain, palpitations and PND.   Gastrointestinal: Negative for heartburn and nausea.   Musculoskeletal: Negative for myalgias.   Neurological: Negative for dizziness and loss of consciousness.   Endo/Heme/Allergies: Does not bruise/bleed easily.   Psychiatric/Behavioral: The patient is not nervous/anxious and does not have insomnia.    All other systems reviewed and are negative.       Objective:   /68   Pulse 73   Ht 1.829 m (6')   Wt 103.4 kg (228 lb)   SpO2 92%   BMI 30.92 kg/m²     Physical Exam   Constitutional: He is oriented to person, place, and time. He appears well-developed and well-nourished.   HENT:   Head: Normocephalic and atraumatic.   Eyes: EOM are normal. Pupils are equal, round, and reactive to light.   Neck: No JVD present.   Cardiovascular: Normal  rate, regular rhythm and intact distal pulses.    No murmur heard.  Pulmonary/Chest: Breath sounds normal. No respiratory distress.   Abdominal: Bowel sounds are normal. He exhibits no distension.   Musculoskeletal: He exhibits no edema or tenderness.   Neurological: He is alert and oriented to person, place, and time. Coordination normal.   Skin: Skin is warm and dry. No rash noted.   Psychiatric: He has a normal mood and affect. His behavior is normal.       Assessment:     1. Coronary artery disease due to lipid rich plaque     2. History of ST elevation myocardial infarction (STEMI) LAD September 2017     3. Status post insertion of drug-eluting stent into left anterior descending artery     4. Former tobacco use - quit Sep 2017 in setting of STEMI         Medical Decision Making:  Today's Assessment / Status / Plan:       Charts reviewed at length    CAD/PCI  EF 50 on echo  meds as is - Im not so sure he';ll need longterm ACEI and BB as his BP is running low  Discussed  DAPT, then lifelong plavix & asa per newer guidelines if tolerated  Plavix if effient not affordable - discussed  Statin  Labs in 6m    CHF  Mild  Echo 2018  meds as above    Congratulated on smoking cessation  RTC 6m, not sure why he was scheduled today

## 2018-03-14 ENCOUNTER — TELEPHONE (OUTPATIENT)
Dept: CARDIOLOGY | Facility: MEDICAL CENTER | Age: 63
End: 2018-03-14

## 2018-03-14 NOTE — TELEPHONE ENCOUNTER
3/14/18 I phoned & spoke to patient asking if he had labs & echo done per Dr. Cross.  He said no.  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 Campos tomorrow. kw

## 2018-03-15 ENCOUNTER — OFFICE VISIT (OUTPATIENT)
Dept: CARDIOLOGY | Facility: PHYSICIAN GROUP | Age: 63
End: 2018-03-15
Payer: MEDICARE

## 2018-03-15 VITALS
BODY MASS INDEX: 31.42 KG/M2 | DIASTOLIC BLOOD PRESSURE: 74 MMHG | SYSTOLIC BLOOD PRESSURE: 126 MMHG | HEIGHT: 72 IN | HEART RATE: 69 BPM | OXYGEN SATURATION: 93 % | WEIGHT: 232 LBS

## 2018-03-15 DIAGNOSIS — I25.2 HISTORY OF ST ELEVATION MYOCARDIAL INFARCTION (STEMI): ICD-10-CM

## 2018-03-15 DIAGNOSIS — E78.5 DYSLIPIDEMIA: ICD-10-CM

## 2018-03-15 DIAGNOSIS — Z79.899 ENCOUNTER FOR MONITORING DIURETIC THERAPY: ICD-10-CM

## 2018-03-15 DIAGNOSIS — Z87.891 FORMER TOBACCO USE: ICD-10-CM

## 2018-03-15 DIAGNOSIS — I10 ESSENTIAL HYPERTENSION: ICD-10-CM

## 2018-03-15 DIAGNOSIS — Z51.81 ENCOUNTER FOR MONITORING DIURETIC THERAPY: ICD-10-CM

## 2018-03-15 DIAGNOSIS — I25.10 CORONARY ARTERY DISEASE INVOLVING NATIVE CORONARY ARTERY OF NATIVE HEART WITHOUT ANGINA PECTORIS: ICD-10-CM

## 2018-03-15 PROCEDURE — 99214 OFFICE O/P EST MOD 30 MIN: CPT | Performed by: INTERNAL MEDICINE

## 2018-03-15 RX ORDER — TRAMADOL HYDROCHLORIDE 50 MG/1
50 TABLET ORAL EVERY 4 HOURS PRN
COMMUNITY
End: 2018-08-14

## 2018-03-15 RX ORDER — OMEPRAZOLE 40 MG/1
40 CAPSULE, DELAYED RELEASE ORAL DAILY
COMMUNITY

## 2018-03-15 RX ORDER — TRAZODONE HYDROCHLORIDE 50 MG/1
100 TABLET ORAL NIGHTLY
COMMUNITY

## 2018-03-15 ASSESSMENT — ENCOUNTER SYMPTOMS
NECK PAIN: 1
CLAUDICATION: 0
PALPITATIONS: 0
DIZZINESS: 0
SHORTNESS OF BREATH: 0
ORTHOPNEA: 0
HEADACHES: 0
PND: 0

## 2018-03-15 NOTE — LETTER
[facility] [address]  Phone: [phone]  Fax: [phone]              Migue Cervantes  1955    Encounter Date: 3/15/2018    Evelyn Campos M.D.          PROGRESS NOTE:  Subjective:   Chief Complaint:   Chief Complaint   Patient presents with   • CHF (Acute)     PP of [place]        Miuge Cervantes is a 62 y.o. male who presents today for follow-up of coronary artery disease.  He had an ST elevation MI in September 2017 with stenting to the LAD. His postprocedure echo had an EF of 50%.  He has been maintained on dual antiplatelet therapy in addition to statin therapy.Happily he is quit smoking.    The day of his MI he awoke at 2 AM with severe burning and chest pressure.  He did not present to the emergency room for several hours.  He was care flighted to [facility] and was having chest pain on the table but had relief of symptoms when his stent was placed.  He has not had a any recurrence of similar symptoms.  He has not been limited by chest pain, pressure or tightness.  He does not have orthopnea, dyspnea on exertion or lower extremity swelling.  He does not have symptoms of claudication.  He has not been dizzy or lightheaded and has not passed out.  His carvedilol has been increased to 6.25 and he has been tolerating all of his heart medications.  I did review his hospital records, no documentation of high risk arrhythmias.    We reviewed the requirements for his 's license and now that it has been 6 months after and he is not having symptoms and he is tolerating all of his medications it is time to have him undergo a treadmill stress test.  We did also ask him to have an echocardiogram to reevaluate his EF but again his EF was 50% after his MI.  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    I have personally reviewed the most recent ECG,  9-11-17 Shows old  anterior and and steep T-wave inversion1.  ECG today shows old anterior MI and very small T wave changes anterior leads.    Most recent echo 9-2017, EF 50%.    Past Medical History:   Diagnosis Date   • Coronary artery disease due to lipid rich plaque    • Dyslipidemia    • Essential hypertension    • Former tobacco use - quit Sep 2017 in setting of STEMI    • Heart burn    • History of ST elevation myocardial infarction (STEMI) LAD September 2017    • Indigestion    • Pain 09-20-12    lower back, 8/10   • Status post insertion of drug-eluting stent into left anterior descending artery      Past Surgical History:   Procedure Laterality Date   • MARQUISE BY LAPAROSCOPY N/A 12/28/2015    Procedure: MARQUISE BY LAPAROSCOPY- With Grams ;  Surgeon: Sandro Obregon M.D.;  Location: SURGERY [facility];  Service:    • LUMBAR DECOMPRESSION  9/25/2012    by Dr. Plata   • LUMBAR FUSION POSTERIOR  9/25/2012    Performed by Mckayla Plata M.D. at SURGERY [facility]   • LUMBAR DECOMPRESSION  9/25/2012    Performed by Mckayla Plata M.D. at SURGERY [facility]   • CERVICAL DISK AND FUSION ANTERIOR  1997/2010    x 2     Family History   Problem Relation Age of Onset   • Heart Attack Father    • Stroke Father    • Heart Attack Paternal Grandmother    • Heart Attack Paternal Grandfather      Social History     Social History   • Marital status:      Spouse name: N/A   • Number of children: N/A   • Years of education: N/A     Occupational History   • Not on file.     Social History Main Topics   • Smoking status: Former Smoker     Packs/day: 2.00     Years: 35.00     Types: Cigarettes     Quit date: 9/11/2017   • Smokeless tobacco: Never Used   • Alcohol use No      Comment: hx; quit 2008   • Drug use: No   • Sexual activity: Not on file     Other Topics Concern   • Not on file     Social History Narrative   • No narrative on file     Allergies   Allergen Reactions   • Nkda [No Known Drug Allergy]        Current Outpatient  Prescriptions   Medication Sig Dispense Refill   • BUDESONIDE PO Take  by mouth.     • omeprazole (PRILOSEC) 40 MG delayed-release capsule Take 40 mg by mouth every day.     • sertraline (ZOLOFT) 50 MG Tab Take 50 mg by mouth every day.     • tramadol (ULTRAM) 50 MG Tab Take 50 mg by mouth every four hours as needed.     • traZODone (DESYREL) 50 MG Tab Take 50 mg by mouth every evening.     • lisinopril (PRINIVIL, ZESTRIL) 40 MG tablet Take 20 mg by mouth every day. 30 Tab 11   • aspirin EC 81 MG EC tablet Take 1 Tab by mouth every day. 30 Tab 3   • nitroglycerin (NITROSTAT) 0.4 MG SL Tab Place 1 Tab under tongue as needed for Chest Pain (up to 3 doses (if SBP greater than 90 mmHg)). 9 Tab 3   • atorvastatin (LIPITOR) 40 MG Tab Take 1 Tab by mouth every bedtime. 30 Tab 3   • carvedilol (COREG) 3.125 MG Tab Take 1 Tab by mouth 2 times a day, with meals. 60 Tab 3   • prasugrel (EFFIENT) 10 MG Tab Take 1 Tab by mouth every day. 30 Tab 3   • oxycodone immediate-release (ROXICODONE) 5 MG Tab Take 1 Tab by mouth every four hours as needed for Severe Pain. 30 Tab 0   • albuterol (VENTOLIN OR PROVENTIL) 108 (90 BASE) MCG/ACT AERS Inhale 2 Puffs by mouth every 6 hours as needed for Shortness of Breath. 8.5 g 3   • hydrochlorothiazide (HYDRODIURIL) 25 MG Tab Take 1 Tab by mouth every day. 30 Tab 11   • lactobacillus granules (LACTINEX/FLORANEX) PACK Take 1 Packet by mouth 3 times a day, with meals. 30 Packet 0     No current facility-administered medications for this visit.        Review of Systems   Constitutional: Negative for malaise/fatigue.   Respiratory: Negative for shortness of breath.    Cardiovascular: Negative for chest pain, palpitations, orthopnea, claudication, leg swelling and PND.   Musculoskeletal: Positive for neck pain.   Neurological: Negative for dizziness and headaches.     All others systems reviewed and negative.     Objective:     Blood pressure 126/74, pulse 69, height 1.829 m (6'), weight 105.2 kg  (232 lb), SpO2 93 %. Body mass index is 31.46 kg/m².    Physical Exam   General: No acute distress. Well nourished.  HEENT: EOM grossly intact, no scleral icterus, no pharyngeal erythema.   Neck:  No JVD, no bruits, trachea midline  CVS: RRR. Normal S1, S2. No M/R/G. No LE edema.  2+ radial pulses, 2+ DT pulses  Resp: CTAB. No wheezing or crackles/rhonchi. Normal respiratory effort.  Abdomen: Soft, NT, no pallavi hepatomegaly.  MSK/Ext: No clubbing or cyanosis.  Skin: Warm and dry, no rashes.  Neurological: CN III-XII grossly intact. No focal deficits.   Psych: A&O x 3, appropriate affect, good judgement    DATA REVIEWED: see HPI    Most recent labs:  9-13-17, Hgb 15, plt 177, K 4.3, Cr 0.82    Assessment:     1. Coronary artery disease involving native coronary artery of native heart without angina pectoris  EKG    Treadmill Stress    ECHOCARDIOGRAM COMP W/O CONT   2. History of ST elevation myocardial infarction (STEMI) LAD September 2017  Treadmill Stress    ECHOCARDIOGRAM COMP W/O CONT   3. Essential hypertension     4. Encounter for monitoring diuretic therapy     5. Dyslipidemia     6. Former tobacco use - quit Sep 2017 in setting of STEMI         Medical Decision Making:  Today's Assessment / Status / Plan:     1.  CAD status post LAD territory MI 2017 with stent to prox LAD for 100% occlusion, 4x20 mm EBONIE, only luminal irregularities RCA/Circ.  2.  Hypertension, controlled  3.  Dyslipidemia, tolerating statin therapy  4.  Gastroesophageal reflux disease, on proton pump inhibitor  5.  Low normal LV systolic function, EF 50%  6. Prior Tobacco use- still not smoking    PLAN:  -Echo  -Treadmill stress test per CDI rules, if this is ok then I will write a letter of release for his CDI and he will pick it up in the office and take it to the [facility]  -RTC 6 months to discuss stopping effient, his stent was 4x20 mm, no other significant disease.      Return in about 6 months (around 9/15/2018).    It is my pleasure to  participate in the care of Mr. Cervantes.  Please do not hesitate to contact me with questions or concerns.    Evelyn Campos MD, [facility]  Cardiologist [facility]      Skinny Garcia M.D.  [address] [place] [id number]  VIA Facsimile: [phone]

## 2018-03-15 NOTE — PROGRESS NOTES
Subjective:   Chief Complaint:   Chief Complaint   Patient presents with   • CHF (Acute)     PP of [place]        Migue Gerald Cervantes is a 62 y.o. male who presents today for follow-up of coronary artery disease.  He had an ST elevation MI in September 2017 with stenting to the LAD. His postprocedure echo had an EF of 50%.  He has been maintained on dual antiplatelet therapy in addition to statin therapy.Happily he is quit smoking.    The day of his MI he awoke at 2 AM with severe burning and chest pressure.  He did not present to the emergency room for several hours.  He was care flighted to [facility] and was having chest pain on the table but had relief of symptoms when his stent was placed.  He has not had a any recurrence of similar symptoms.  He has not been limited by chest pain, pressure or tightness.  He does not have orthopnea, dyspnea on exertion or lower extremity swelling.  He does not have symptoms of claudication.  He has not been dizzy or lightheaded and has not passed out.  His carvedilol has been increased to 6.25 and he has been tolerating all of his heart medications.  I did review his hospital records, no documentation of high risk arrhythmias.    We reviewed the requirements for his 's license and now that it has been 6 months after and he is not having symptoms and he is tolerating all of his medications it is time to have him undergo a treadmill stress test.  We did also ask him to have an echocardiogram to reevaluate his EF but again his EF was 50% after his MI.  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    I have personally reviewed the most recent ECG,  9-11-17 Shows old anterior and and steep T-wave inversion1.  ECG today shows old anterior MI and very small T wave changes anterior leads.    Most recent echo 9-2017, EF 50%.    Past Medical History:   Diagnosis Date   • Coronary artery disease due to lipid rich plaque    • Dyslipidemia    •  Essential hypertension    • Former tobacco use - quit Sep 2017 in setting of STEMI    • Heart burn    • History of ST elevation myocardial infarction (STEMI) LAD September 2017    • Indigestion    • Pain 09-20-12    lower back, 8/10   • Status post insertion of drug-eluting stent into left anterior descending artery      Past Surgical History:   Procedure Laterality Date   • MARQUISE BY LAPAROSCOPY N/A 12/28/2015    Procedure: MARQUISE BY LAPAROSCOPY- With Grams ;  Surgeon: Sandro Obregon M.D.;  Location: SURGERY [facility];  Service:    • LUMBAR DECOMPRESSION  9/25/2012    by Dr. Plata   • LUMBAR FUSION POSTERIOR  9/25/2012    Performed by Mckayla Plata M.D. at SURGERY [facility]   • LUMBAR DECOMPRESSION  9/25/2012    Performed by Mckayla Plata M.D. at SURGERY [facility]   • CERVICAL DISK AND FUSION ANTERIOR  1997/2010    x 2     Family History   Problem Relation Age of Onset   • Heart Attack Father    • Stroke Father    • Heart Attack Paternal Grandmother    • Heart Attack Paternal Grandfather      Social History     Social History   • Marital status:      Spouse name: N/A   • Number of children: N/A   • Years of education: N/A     Occupational History   • Not on file.     Social History Main Topics   • Smoking status: Former Smoker     Packs/day: 2.00     Years: 35.00     Types: Cigarettes     Quit date: 9/11/2017   • Smokeless tobacco: Never Used   • Alcohol use No      Comment: hx; quit 2008   • Drug use: No   • Sexual activity: Not on file     Other Topics Concern   • Not on file     Social History Narrative   • No narrative on file     Allergies   Allergen Reactions   • Nkda [No Known Drug Allergy]        Current Outpatient Prescriptions   Medication Sig Dispense Refill   • BUDESONIDE PO Take  by mouth.     • omeprazole (PRILOSEC) 40 MG delayed-release capsule Take 40 mg by mouth every day.     • sertraline (ZOLOFT) 50 MG Tab Take 50 mg by mouth every day.     • tramadol (ULTRAM) 50 MG Tab Take  50 mg by mouth every four hours as needed.     • traZODone (DESYREL) 50 MG Tab Take 50 mg by mouth every evening.     • lisinopril (PRINIVIL, ZESTRIL) 40 MG tablet Take 20 mg by mouth every day. 30 Tab 11   • aspirin EC 81 MG EC tablet Take 1 Tab by mouth every day. 30 Tab 3   • nitroglycerin (NITROSTAT) 0.4 MG SL Tab Place 1 Tab under tongue as needed for Chest Pain (up to 3 doses (if SBP greater than 90 mmHg)). 9 Tab 3   • atorvastatin (LIPITOR) 40 MG Tab Take 1 Tab by mouth every bedtime. 30 Tab 3   • carvedilol (COREG) 3.125 MG Tab Take 1 Tab by mouth 2 times a day, with meals. 60 Tab 3   • prasugrel (EFFIENT) 10 MG Tab Take 1 Tab by mouth every day. 30 Tab 3   • oxycodone immediate-release (ROXICODONE) 5 MG Tab Take 1 Tab by mouth every four hours as needed for Severe Pain. 30 Tab 0   • albuterol (VENTOLIN OR PROVENTIL) 108 (90 BASE) MCG/ACT AERS Inhale 2 Puffs by mouth every 6 hours as needed for Shortness of Breath. 8.5 g 3   • hydrochlorothiazide (HYDRODIURIL) 25 MG Tab Take 1 Tab by mouth every day. 30 Tab 11   • lactobacillus granules (LACTINEX/FLORANEX) PACK Take 1 Packet by mouth 3 times a day, with meals. 30 Packet 0     No current facility-administered medications for this visit.        Review of Systems   Constitutional: Negative for malaise/fatigue.   Respiratory: Negative for shortness of breath.    Cardiovascular: Negative for chest pain, palpitations, orthopnea, claudication, leg swelling and PND.   Musculoskeletal: Positive for neck pain.   Neurological: Negative for dizziness and headaches.     All others systems reviewed and negative.     Objective:     Blood pressure 126/74, pulse 69, height 1.829 m (6'), weight 105.2 kg (232 lb), SpO2 93 %. Body mass index is 31.46 kg/m².    Physical Exam   General: No acute distress. Well nourished.  HEENT: EOM grossly intact, no scleral icterus, no pharyngeal erythema.   Neck:  No JVD, no bruits, trachea midline  CVS: RRR. Normal S1, S2. No M/R/G. No LE  edema.  2+ radial pulses, 2+ DT pulses  Resp: CTAB. No wheezing or crackles/rhonchi. Normal respiratory effort.  Abdomen: Soft, NT, no pallavi hepatomegaly.  MSK/Ext: No clubbing or cyanosis.  Skin: Warm and dry, no rashes.  Neurological: CN III-XII grossly intact. No focal deficits.   Psych: A&O x 3, appropriate affect, good judgement    DATA REVIEWED: see HPI    Most recent labs:  9-13-17, Hgb 15, plt 177, K 4.3, Cr 0.82    Assessment:     1. Coronary artery disease involving native coronary artery of native heart without angina pectoris  EKG    Treadmill Stress    ECHOCARDIOGRAM COMP W/O CONT   2. History of ST elevation myocardial infarction (STEMI) LAD September 2017  Treadmill Stress    ECHOCARDIOGRAM COMP W/O CONT   3. Essential hypertension     4. Encounter for monitoring diuretic therapy     5. Dyslipidemia     6. Former tobacco use - quit Sep 2017 in setting of STEMI         Medical Decision Making:  Today's Assessment / Status / Plan:     1.  CAD status post LAD territory MI 2017 with stent to prox LAD for 100% occlusion, 4x20 mm EBONIE, only luminal irregularities RCA/Circ.  2.  Hypertension, controlled  3.  Dyslipidemia, tolerating statin therapy  4.  Gastroesophageal reflux disease, on proton pump inhibitor  5.  Low normal LV systolic function, EF 50%  6. Prior Tobacco use- still not smoking    PLAN:  -Echo  -Treadmill stress test per CDI rules, if this is ok then I will write a letter of release for his CDI and he will pick it up in the office and take it to the [facility]  -RTC 6 months to discuss stopping effient, his stent was 4x20 mm, no other significant disease.      Return in about 6 months (around 9/15/2018).    It is my pleasure to participate in the care of Mr. Cervantes.  Please do not hesitate to contact me with questions or concerns.    Evelyn Campos MD, [facility]  Cardiologist [facility]

## 2018-04-19 ENCOUNTER — TELEPHONE (OUTPATIENT)
Dept: CARDIOLOGY | Facility: MEDICAL CENTER | Age: 63
End: 2018-04-19

## 2018-04-19 NOTE — TELEPHONE ENCOUNTER
S/w pt's wife, Janeth, states [facility] and [facility]uc [facility] on Wednesday and she was told by the [facility] we would have the results yesterday.  Advised no results received yet.  W[facility] will [facility] and will follow up with our office tomorrow or Monday as well.    Mikal campa f/u.

## 2018-04-19 NOTE — TELEPHONE ENCOUNTER
----- Message from Norma Valerio sent at 4/19/2018  1:09 PM PDT -----  Regarding: calling for test results  LS/Mary    Patient's wife Janeth is calling for test results he had done yesterday. She can be reached at [phone] or on her cell at [phone].

## 2018-04-23 NOTE — TELEPHONE ENCOUNTER
No records received at this time. [facility] at [phone] to please send over both Echo and NM-stress test results.     Called and s/w pt. Educated him that unfortunatly we still have not received the records from the [facility]. Informed him that I have sent an urgent request for records. He is greatly appreciative of assistance. Reassured him that once information is received and reviewed by Dr. Campos, we will call him to follow up. Pt notes that he has been having to use his oxygen more due to O2 saturation dropping while ambulating. He notes that he use to see a pulmonary MD a while back ago. Educated him to please try to schedule an appointment with Pulmonology. He is appreciative of information and denies further needs at this time.     ------------------------------  From: Norma Valerio   Sent: 4/23/2018   8:23 AM   To: Robina Cabrera R.N.   Subject: [facility] calling about [facility]           [place]/Robina     Patient's wife Janeth is calling about [facility]. She can be reached at [phone].

## 2018-04-24 ENCOUNTER — TELEPHONE (OUTPATIENT)
Dept: CARDIOLOGY | Facility: MEDICAL CENTER | Age: 63
End: 2018-04-24

## 2018-04-24 DIAGNOSIS — I50.21 ACUTE SYSTOLIC CONGESTIVE HEART FAILURE (HCC): ICD-10-CM

## 2018-04-24 RX ORDER — FUROSEMIDE 40 MG/1
40 TABLET ORAL DAILY
Qty: 30 TAB | Refills: 11 | Status: SHIPPED | OUTPATIENT
Start: 2018-04-24 | End: 2019-09-09

## 2018-04-24 NOTE — TELEPHONE ENCOUNTER
Echo and stress test reviewed by Dr. Campos, noted:    Evelyn Campos M.D.  Rosario Pham R.N.   Cc: Leonard Medeiros M.D.; Nurys Taylor, Ankush Ass't      Migue needs an ICD and can no longer have a 's license.  He took the news very well and [facility]  Nurys is setting that up.  FYI to those two.     Rosario, he asked if we could send something in the mail.  Could you find some information on primary prevention ICD after heart attack, either up to date or online and just print it out and send in the mail?  Let me know if you have any trouble finding something.     Thanks everyone, LS      Education given per Dr. Campos request. Nurys SOUZA assisted with scheduling pt.

## 2018-04-24 NOTE — TELEPHONE ENCOUNTER
Called and s/w pt about Dr. Campos's recommendations. He is okay to start lasix and will buy a scale to start to monitor weight. Pt educated in detail on medication and recommendations on weight. He states verbal understanding. Pt would like to get lab done at [place]. Educated him that lab slip will be faxed per request. Pt very appreciative of assistance.     BMP faxed to [facility] @[phone]    Evelyn Campos M.D.  Rosario Pham R.N.; Nurys Taylor, Med Ass't   Pt's wife is describing pt being in some mild CHF, V, plse tell pt to stop HCTZ and start lasix 40 mg daily, check weight and write it down daily (his wife will  a scale at [facility] when she picks up the lasix), I ordered a BMP for 2 weeks.     cara Nuno get first available FU with me in [place].     Thank you!

## 2018-05-11 ENCOUNTER — OFFICE VISIT (OUTPATIENT)
Dept: CARDIOLOGY | Facility: PHYSICIAN GROUP | Age: 63
End: 2018-05-11
Payer: MEDICARE

## 2018-05-11 VITALS
DIASTOLIC BLOOD PRESSURE: 80 MMHG | BODY MASS INDEX: 31.29 KG/M2 | HEIGHT: 72 IN | SYSTOLIC BLOOD PRESSURE: 120 MMHG | WEIGHT: 231 LBS | OXYGEN SATURATION: 90 % | HEART RATE: 68 BPM

## 2018-05-11 DIAGNOSIS — I25.2 HISTORY OF ST ELEVATION MYOCARDIAL INFARCTION (STEMI): ICD-10-CM

## 2018-05-11 DIAGNOSIS — I50.21 ACUTE SYSTOLIC CONGESTIVE HEART FAILURE (HCC): ICD-10-CM

## 2018-05-11 DIAGNOSIS — Z87.891 FORMER TOBACCO USE: ICD-10-CM

## 2018-05-11 DIAGNOSIS — E78.5 DYSLIPIDEMIA: ICD-10-CM

## 2018-05-11 DIAGNOSIS — I10 ESSENTIAL HYPERTENSION: ICD-10-CM

## 2018-05-11 DIAGNOSIS — I25.10 CORONARY ARTERY DISEASE INVOLVING NATIVE CORONARY ARTERY OF NATIVE HEART WITHOUT ANGINA PECTORIS: ICD-10-CM

## 2018-05-11 DIAGNOSIS — Z95.5 STATUS POST INSERTION OF DRUG-ELUTING STENT INTO LEFT ANTERIOR DESCENDING ARTERY: ICD-10-CM

## 2018-05-11 PROCEDURE — 99215 OFFICE O/P EST HI 40 MIN: CPT | Performed by: INTERNAL MEDICINE

## 2018-05-11 RX ORDER — CARVEDILOL 25 MG/1
25 TABLET ORAL 2 TIMES DAILY WITH MEALS
Qty: 180 TAB | Refills: 3 | Status: SHIPPED | OUTPATIENT
Start: 2018-05-11

## 2018-05-11 RX ORDER — SPIRONOLACTONE 25 MG/1
25 TABLET ORAL DAILY
Qty: 90 TAB | Refills: 3 | Status: SHIPPED | OUTPATIENT
Start: 2018-05-11

## 2018-05-11 RX ORDER — LISINOPRIL 10 MG/1
10 TABLET ORAL DAILY
Qty: 90 TAB | Refills: 11 | Status: SHIPPED | OUTPATIENT
Start: 2018-05-11 | End: 2018-11-15

## 2018-05-11 ASSESSMENT — ENCOUNTER SYMPTOMS
ORTHOPNEA: 0
HEADACHES: 0
NECK PAIN: 1
DIZZINESS: 0
CLAUDICATION: 0
SHORTNESS OF BREATH: 0
PND: 0
PALPITATIONS: 0

## 2018-05-11 NOTE — LETTER
[facility] [address]  Phone: [phone]  Fax: [phone]              Migue Cervantes  1955    Encounter Date: 5/11/2018    Evelyn Campos M.D.          PROGRESS NOTE:  Subjective:   Chief Complaint:   Chief Complaint   Patient presents with   • CHF (Acute)     Follow up       Migue Cervantes is a 62 y.o. male who presents today for follow-up of coronary artery disease.  He had an ST elevation MI in September 2017 with stenting to the LAD. His postprocedure echo had an EF of 50%.  Nuclear perfusion stress test showed a fixed LAD territory defect and his echocardiogram showed low EF at 30-35% with aneurysm of the anterior wall.  He has been maintained on dual antiplatelet therapy in addition to statin therapy. He is on CHF meds but these have been adjusted today. Happily he is quit smoking.    The day of his MI he awoke at 2 AM with severe burning and chest pressure.  He did not present to the emergency room for several hours.  He was care flighted to [facility] and was having chest pain on the table but had relief of symptoms when his stent was placed.  He has not had a any recurrence of similar symptoms.  He has not been limited by chest pain, pressure or tightness.  He does not have symptoms of claudication.  He has not been dizzy or lightheaded and has not passed out.     He does dyspnea on exertion and some lower extremity swelling but this improved with lasix.  His BP at home is controlled, SBP <120 but he has been weighing himself in the evening so they are not consistent.  He has lost 0.5 kg by office weight.    DATA REVIEWED by me: see HPI  ECG,  9-11-17   Old anterior and and steep T-wave inversion1.      ECG 3-15-18  Old anterior MI and very small T wave changes anterior leads.    April 17, 2018, EF 30-35%, anterior apical akinesis and anterior wall aneurysm more, mildly dilated left atrium, aortic valve  sclerosis without stenosis, mild AI, RV poorly visualized but grossly normal in size and function, mild TR 45-50 mmHg    Nuclear perfusion stress test April 18, 2018  Perfusion defect in the anterior wall, apex and septum, LVEF calculated at 42%, abnormal wall motion    [facility] 9-11-17:  -Coronary stent implantation proximal left anterior descending artery 4.0 x 20 mm drug-eluting [employer]'s energy stent.  -Left anterior descending artery proximal 100% occlusion.  -Left ventricular ejection fraction 45% with anterior apical akinesis.      Most recent labs:    5-1-18   Na+ 138, potassium 4, creatinine 1.06, BUN 9    9-13-17, Hgb 15, plt 177, K 4.3, Cr 0.82    Past Medical History:   Diagnosis Date   • Coronary artery disease due to lipid rich plaque    • Dyslipidemia    • Essential hypertension    • Former tobacco use - quit Sep 2017 in setting of STEMI    • Heart burn    • History of ST elevation myocardial infarction (STEMI) LAD September 2017    • Indigestion    • Pain 09-20-12    lower back, 8/10   • Status post insertion of drug-eluting stent into left anterior descending artery      Past Surgical History:   Procedure Laterality Date   • MARQUISE BY LAPAROSCOPY N/A 12/28/2015    Procedure: MARQUISE BY LAPAROSCOPY- With Grams ;  Surgeon: Sandro Obregon M.D.;  Location: SURGERY [facility];  Service:    • LUMBAR DECOMPRESSION  9/25/2012    by Dr. Plata   • LUMBAR FUSION POSTERIOR  9/25/2012    Performed by Mckayla Plata M.D. at SURGERY [facility]   • LUMBAR DECOMPRESSION  9/25/2012    Performed by Mckayla Plata M.D. at SURGERY [facility]   • CERVICAL DISK AND FUSION ANTERIOR  1997/2010    x 2     Family History   Problem Relation Age of Onset   • Heart Attack Father    • Stroke Father    • Heart Attack Paternal Grandmother    • Heart Attack Paternal Grandfather      Social History     Social History   • Marital status:      Spouse name: N/A   • Number of children: N/A   • Years of education: N/A      Occupational History   • Not on file.     Social History Main Topics   • Smoking status: Former Smoker     Packs/day: 2.00     Years: 35.00     Types: Cigarettes     Quit date: 9/11/2017   • Smokeless tobacco: Never Used   • Alcohol use No      Comment: hx; quit 2008   • Drug use: No   • Sexual activity: Not on file     Other Topics Concern   • Not on file     Social History Narrative   • No narrative on file     Allergies   Allergen Reactions   • Nkda [No Known Drug Allergy]        Current Outpatient Prescriptions   Medication Sig Dispense Refill   • lisinopril (PRINIVIL) 10 MG Tab Take 1 Tab by mouth every day. 90 Tab 11   • carvedilol (COREG) 25 MG Tab Take 1 Tab by mouth 2 times a day, with meals. 180 Tab 3   • spironolactone (ALDACTONE) 25 MG Tab Take 1 Tab by mouth every day. 90 Tab 3   • furosemide (LASIX) 40 MG Tab Take 1 Tab by mouth every day. 30 Tab 11   • BUDESONIDE PO Take  by mouth.     • omeprazole (PRILOSEC) 40 MG delayed-release capsule Take 40 mg by mouth every day.     • sertraline (ZOLOFT) 50 MG Tab Take 50 mg by mouth every day.     • traZODone (DESYREL) 50 MG Tab Take 50 mg by mouth every evening.     • aspirin EC 81 MG EC tablet Take 1 Tab by mouth every day. 30 Tab 3   • atorvastatin (LIPITOR) 40 MG Tab Take 1 Tab by mouth every bedtime. 30 Tab 3   • prasugrel (EFFIENT) 10 MG Tab Take 1 Tab by mouth every day. 30 Tab 3   • oxycodone immediate-release (ROXICODONE) 5 MG Tab Take 1 Tab by mouth every four hours as needed for Severe Pain. 30 Tab 0   • lactobacillus granules (LACTINEX/FLORANEX) PACK Take 1 Packet by mouth 3 times a day, with meals. 30 Packet 0   • tramadol (ULTRAM) 50 MG Tab Take 50 mg by mouth every four hours as needed.     • nitroglycerin (NITROSTAT) 0.4 MG SL Tab Place 1 Tab under tongue as needed for Chest Pain (up to 3 doses (if SBP greater than 90 mmHg)). 9 Tab 3   • albuterol (VENTOLIN OR PROVENTIL) 108 (90 BASE) MCG/ACT AERS Inhale 2 Puffs by mouth every 6 hours as  needed for Shortness of Breath. 8.5 g 3     No current facility-administered medications for this visit.        Review of Systems   Constitutional: Negative for malaise/fatigue.   Respiratory: Negative for shortness of breath.    Cardiovascular: Negative for chest pain, palpitations, orthopnea, claudication, leg swelling and PND.   Musculoskeletal: Positive for neck pain.   Neurological: Negative for dizziness and headaches.     All others systems reviewed and negative.     Objective:     Blood pressure 120/80, pulse 68, height 1.829 m (6'), weight 104.8 kg (231 lb), SpO2 90 %. Body mass index is 31.33 kg/m².    Physical Exam   General: No acute distress. Well nourished.  HEENT: EOM grossly intact, no scleral icterus, no pharyngeal erythema.   Neck:  No JVD, no bruits, trachea midline  CVS: RRR. Normal S1, S2. No M/R/G. No LE edema.  2+ radial pulses, 2+ DT pulses  Resp: CTAB. No wheezing or crackles/rhonchi. Normal respiratory effort. Mild prolonged exp phase  Abdomen: Soft, NT, no pallavi hepatomegaly.  MSK/Ext: No clubbing or cyanosis.  Skin: Warm and dry, no rashes.  Neurological: CN III-XII grossly intact. No focal deficits.   Psych: A&O x 3, appropriate affect, good judgement      Assessment:     1. Acute systolic congestive heart failure (HCC)  BASIC METABOLIC PANEL   2. Coronary artery disease involving native coronary artery of native heart without angina pectoris  LIPID PANEL    COMP METABOLIC PANEL   3. History of ST elevation myocardial infarction (STEMI) LAD September 2017     4. Essential hypertension  COMP METABOLIC PANEL   5. Dyslipidemia  LIPID PANEL    COMP METABOLIC PANEL   6. Former tobacco use - quit Sep 2017 in setting of STEMI     7. Status post insertion of drug-eluting stent into left anterior descending artery         Medical Decision Making:  Today's Assessment / Status / Plan:     1.  CAD status post LAD territory MI 2017 with stent to prox LAD for 100% occlusion, 4x20 mm EBONIE, only luminal  irregularities RCA/Circ.  2.  The systolic function with an EF of 30-35% with anterior wall motion abnormalities and anterior aneurysm  3.  Acute congestive heart failure, [place] heart class III symptoms  4.  Hypertension, controlled  5.  Dyslipidemia, tolerating statin therapy  6.  Gastroesophageal reflux disease, on proton pump inhibitor  7.  COPD, needs chronic O2 which has been approved but not yet received  8. Prior Tobacco use- still not smoking  9. Dyspnea, likely related to CHF and COPD    PLAN:  -And over the phone about his low EF.  Unfortunately he cannot have a 's license at this time.  He is a candidate for a primary prevention ICD and I believe he is high risk particularly given the aneurysmal anterior wall.  [facility]  -Check daily weights in the morning  -med adjustments, see below    Written instructions given today:  -Reduce lisinopril from 40 mg to 10 mg once daily to make room in your blood pressure to increase carvedilol from 6.25 mg to 25 mg twice daily  -You can take 4 of the 6.25 mg AM and PM until gone  -Start spironolactone, another diuretic that can increase your potassium too high so we monitor this with blood work  -[facility]   -Call Mikey for any questions or side effects: Rosario ELAINE, [phone]  -Blood work, fasting in 2 weeks, then another basic metabolic panel in 2 months (not fasting)  -Return in 3 months      Return in about 3 months (around 8/11/2018).    It is my pleasure to participate in the care of Mr. Cervantes.  Please do not hesitate to contact me with questions or concerns.    Evelyn Campos MD, [facility]  Cardiologist [facility]      Skinny Garcia M.D.  [address] [place] [id number]  VIA Facsimile: [phone]

## 2018-05-11 NOTE — PROGRESS NOTES
Subjective:   Chief Complaint:   Chief Complaint   Patient presents with   • CHF (Acute)     Follow up       Migue Cervantes is a 62 y.o. male who presents today for follow-up of coronary artery disease.  He had an ST elevation MI in September 2017 with stenting to the LAD. His postprocedure echo had an EF of 50%.  Nuclear perfusion stress test showed a fixed LAD territory defect and his echocardiogram showed low EF at 30-35% with aneurysm of the anterior wall.  He has been maintained on dual antiplatelet therapy in addition to statin therapy. He is on CHF meds but these have been adjusted today. Happily he is quit smoking.    The day of his MI he awoke at 2 AM with severe burning and chest pressure.  He did not present to the emergency room for several hours.  He was care flighted to [facility] and was having chest pain on the table but had relief of symptoms when his stent was placed.  He has not had a any recurrence of similar symptoms.  He has not been limited by chest pain, pressure or tightness.  He does not have symptoms of claudication.  He has not been dizzy or lightheaded and has not passed out.     He does dyspnea on exertion and some lower extremity swelling but this improved with lasix.  His BP at home is controlled, SBP <120 but he has been weighing himself in the evening so they are not consistent.  He has lost 0.5 kg by office weight.    DATA REVIEWED by me: see HPI  ECG,  9-11-17   Old anterior and and steep T-wave inversion1.      ECG 3-15-18  Old anterior MI and very small T wave changes anterior leads.    April 17, 2018, EF 30-35%, anterior apical akinesis and anterior wall aneurysm more, mildly dilated left atrium, aortic valve sclerosis without stenosis, mild AI, RV poorly visualized but grossly normal in size and function, mild TR 45-50 mmHg    Nuclear perfusion stress test April 18, 2018  Perfusion defect in the anterior wall, apex and septum, LVEF calculated at 42%, abnormal wall  motion    [facility] 9-11-17:  -Coronary stent implantation proximal left anterior descending artery 4.0 x 20 mm drug-eluting [employer]'s energy stent.  -Left anterior descending artery proximal 100% occlusion.  -Left ventricular ejection fraction 45% with anterior apical akinesis.      Most recent labs:    5-1-18   Na+ 138, potassium 4, creatinine 1.06, BUN 9    9-13-17, Hgb 15, plt 177, K 4.3, Cr 0.82    Past Medical History:   Diagnosis Date   • Coronary artery disease due to lipid rich plaque    • Dyslipidemia    • Essential hypertension    • Former tobacco use - quit Sep 2017 in setting of STEMI    • Heart burn    • History of ST elevation myocardial infarction (STEMI) LAD September 2017    • Indigestion    • Pain 09-20-12    lower back, 8/10   • Status post insertion of drug-eluting stent into left anterior descending artery      Past Surgical History:   Procedure Laterality Date   • MARQUISE BY LAPAROSCOPY N/A 12/28/2015    Procedure: MARQUISE BY LAPAROSCOPY- With Grams ;  Surgeon: Sandro Obregon M.D.;  Location: SURGERY [facility];  Service:    • LUMBAR DECOMPRESSION  9/25/2012    by Dr. Plata   • LUMBAR FUSION POSTERIOR  9/25/2012    Performed by Mckayla Plata M.D. at SURGERY [facility]   • LUMBAR DECOMPRESSION  9/25/2012    Performed by Mckayla Plata M.D. at SURGERY [facility]   • CERVICAL DISK AND FUSION ANTERIOR  1997/2010    x 2     Family History   Problem Relation Age of Onset   • Heart Attack Father    • Stroke Father    • Heart Attack Paternal Grandmother    • Heart Attack Paternal Grandfather      Social History     Social History   • Marital status:      Spouse name: N/A   • Number of children: N/A   • Years of education: N/A     Occupational History   • Not on file.     Social History Main Topics   • Smoking status: Former Smoker     Packs/day: 2.00     Years: 35.00     Types: Cigarettes     Quit date: 9/11/2017   • Smokeless tobacco: Never Used   • Alcohol use No      Comment:  hx; quit 2008   • Drug use: No   • Sexual activity: Not on file     Other Topics Concern   • Not on file     Social History Narrative   • No narrative on file     Allergies   Allergen Reactions   • Nkda [No Known Drug Allergy]        Current Outpatient Prescriptions   Medication Sig Dispense Refill   • lisinopril (PRINIVIL) 10 MG Tab Take 1 Tab by mouth every day. 90 Tab 11   • carvedilol (COREG) 25 MG Tab Take 1 Tab by mouth 2 times a day, with meals. 180 Tab 3   • spironolactone (ALDACTONE) 25 MG Tab Take 1 Tab by mouth every day. 90 Tab 3   • furosemide (LASIX) 40 MG Tab Take 1 Tab by mouth every day. 30 Tab 11   • BUDESONIDE PO Take  by mouth.     • omeprazole (PRILOSEC) 40 MG delayed-release capsule Take 40 mg by mouth every day.     • sertraline (ZOLOFT) 50 MG Tab Take 50 mg by mouth every day.     • traZODone (DESYREL) 50 MG Tab Take 50 mg by mouth every evening.     • aspirin EC 81 MG EC tablet Take 1 Tab by mouth every day. 30 Tab 3   • atorvastatin (LIPITOR) 40 MG Tab Take 1 Tab by mouth every bedtime. 30 Tab 3   • prasugrel (EFFIENT) 10 MG Tab Take 1 Tab by mouth every day. 30 Tab 3   • oxycodone immediate-release (ROXICODONE) 5 MG Tab Take 1 Tab by mouth every four hours as needed for Severe Pain. 30 Tab 0   • lactobacillus granules (LACTINEX/FLORANEX) PACK Take 1 Packet by mouth 3 times a day, with meals. 30 Packet 0   • tramadol (ULTRAM) 50 MG Tab Take 50 mg by mouth every four hours as needed.     • nitroglycerin (NITROSTAT) 0.4 MG SL Tab Place 1 Tab under tongue as needed for Chest Pain (up to 3 doses (if SBP greater than 90 mmHg)). 9 Tab 3   • albuterol (VENTOLIN OR PROVENTIL) 108 (90 BASE) MCG/ACT AERS Inhale 2 Puffs by mouth every 6 hours as needed for Shortness of Breath. 8.5 g 3     No current facility-administered medications for this visit.        Review of Systems   Constitutional: Negative for malaise/fatigue.   Respiratory: Negative for shortness of breath.    Cardiovascular: Negative for  chest pain, palpitations, orthopnea, claudication, leg swelling and PND.   Musculoskeletal: Positive for neck pain.   Neurological: Negative for dizziness and headaches.     All others systems reviewed and negative.     Objective:     Blood pressure 120/80, pulse 68, height 1.829 m (6'), weight 104.8 kg (231 lb), SpO2 90 %. Body mass index is 31.33 kg/m².    Physical Exam   General: No acute distress. Well nourished.  HEENT: EOM grossly intact, no scleral icterus, no pharyngeal erythema.   Neck:  No JVD, no bruits, trachea midline  CVS: RRR. Normal S1, S2. No M/R/G. No LE edema.  2+ radial pulses, 2+ DT pulses  Resp: CTAB. No wheezing or crackles/rhonchi. Normal respiratory effort. Mild prolonged exp phase  Abdomen: Soft, NT, no pallavi hepatomegaly.  MSK/Ext: No clubbing or cyanosis.  Skin: Warm and dry, no rashes.  Neurological: CN III-XII grossly intact. No focal deficits.   Psych: A&O x 3, appropriate affect, good judgement      Assessment:     1. Acute systolic congestive heart failure (HCC)  BASIC METABOLIC PANEL   2. Coronary artery disease involving native coronary artery of native heart without angina pectoris  LIPID PANEL    COMP METABOLIC PANEL   3. History of ST elevation myocardial infarction (STEMI) LAD September 2017     4. Essential hypertension  COMP METABOLIC PANEL   5. Dyslipidemia  LIPID PANEL    COMP METABOLIC PANEL   6. Former tobacco use - quit Sep 2017 in setting of STEMI     7. Status post insertion of drug-eluting stent into left anterior descending artery         Medical Decision Making:  Today's Assessment / Status / Plan:     1.  CAD status post LAD territory MI 2017 with stent to prox LAD for 100% occlusion, 4x20 mm EBONIE, only luminal irregularities RCA/Circ.  2.  The systolic function with an EF of 30-35% with anterior wall motion abnormalities and anterior aneurysm  3.  Acute congestive heart failure, [place] heart class III symptoms  4.  Hypertension, controlled  5.  Dyslipidemia,  tolerating statin therapy  6.  Gastroesophageal reflux disease, on proton pump inhibitor  7.  COPD, needs chronic O2 which has been approved but not yet received  8. Prior Tobacco use- still not smoking  9. Dyspnea, likely related to CHF and COPD    PLAN:  -And over the phone about his low EF.  Unfortunately he cannot have a 's license at this time.  He is a candidate for a primary prevention ICD and I believe he is high risk particularly given the aneurysmal anterior wall.  [facility]  -Check daily weights in the morning  -med adjustments, see below    Written instructions given today:  -Reduce lisinopril from 40 mg to 10 mg once daily to make room in your blood pressure to increase carvedilol from 6.25 mg to 25 mg twice daily  -You can take 4 of the 6.25 mg AM and PM until gone  -Start spironolactone, another diuretic that can increase your potassium too high so we monitor this with blood work  -[facility]   -Call Smith for any questions or side effects: Rosario ELAINE, [phone]  -Blood work, fasting in 2 weeks, then another basic metabolic panel in 2 months (not fasting)  -Return in 3 months      Return in about 3 months (around 8/11/2018).    It is my pleasure to participate in the care of Mr. Cervantes.  Please do not hesitate to contact me with questions or concerns.    Evelyn Campos MD, [facility]  Cardiologist [facility]

## 2018-05-11 NOTE — PATIENT INSTRUCTIONS
-Reduce lisinopril from 40 mg to 10 mg once daily to make room in your blood pressure to increase carvedilol from 6.25 mg to 25 mg twice daily  -You can take 4 of the 6.25 mg AM and PM until gone  -Start spironolactone, another diuretic that can increase your potassium too high so we monitor this with blood work  -[facility]   -Call Stevens for any questions or side effects: Rosario ELAINE, [phone]  -Blood work, fasting in 2 weeks, then another basic metabolic panel in 2 months (not fasting)  -Return in 3 months

## 2018-05-25 ENCOUNTER — TELEPHONE (OUTPATIENT)
Dept: CARDIOLOGY | Facility: MEDICAL CENTER | Age: 63
End: 2018-05-25

## 2018-05-25 NOTE — TELEPHONE ENCOUNTER
Called and s/w pt's wife, Janeth. Wife requesting a call back later today due to being at work. Reassured that I will call back after 1300 per request.     1405: Attempted to call again, no answer. L/m for wife to please call back.    ----- Message from John Sanches sent at 5/25/2018  8:07 AM PDT -----  Regarding: wife calling to discuss changes made by [facility] doctor   Contact: [phone]  Mayuri    Pt's wife, Janeth is calling to discuss some changes VA doctor made during recent visit. She can be reached at [phone].

## 2018-05-29 NOTE — TELEPHONE ENCOUNTER
Pt's wife, Janeth, called to FU on pt's [facility] office visit. Notes that [facility] MD told pt not get defibrillator, but to wear oxygen 24/7 and to get a echo and stress test done. Pt and wife are very uncomfortable with the [facility]'s MD advise and would like Dr. Campos to know. They are unsure what they should do from here and would like Dr. Campos to provide her recommendations. Reassured Janeth and pt that Dr. Campos will be back in the office tomorrow and that we will follow up then. They are both appreciative of assistance and will wait for follow up.    To Dr. Campos, please advise. Thank you

## 2018-06-04 NOTE — TELEPHONE ENCOUNTER
Notified both pt and pt's wife, Janeth on Dr. Campos's recommendations. They state understanding and is very appreciative of follow up and agree with Dr. Campos. Wife is not sure of MD's name that [facility] and notes that she will call back when she finds out information. They deny further questions at this time.    Evelyn Campos M.D.  Rosario Pham R.N.   Caller: Unspecified (1 week ago)      I can assure both of them that myself and Dr. Medeiros and unflinchingly sure that he needs a defibrillator.  [facility] Medeiros [facility] and determine this, not the doc who saw them.  That being said, I would like the name, address and phone number of that doctor so I can call or send a letter.  Please ask them and assure them that, while I agree he should wear O2, my opinion and Dr. Medeiros's opinion matter more.   Thank you!   L

## 2018-06-12 ENCOUNTER — TELEPHONE (OUTPATIENT)
Dept: CARDIOLOGY | Facility: MEDICAL CENTER | Age: 63
End: 2018-06-12

## 2018-06-12 NOTE — TELEPHONE ENCOUNTER
Per Nurys, pt is scheduled to see Dr. Medeiros on 6/21 at [facility].     Called to FU with pt. Educated him on appointment and POC per Dr. Campos's recommendations. He states verbal understanding and is appreciative of follow up. Pt to make sure to see Dr. Medeiros on 6/21 at [facility]. He is to call if any questions or concerns.

## 2018-06-12 NOTE — TELEPHONE ENCOUNTER
Call received from Yelitza, pt's sister. Notes that pt is getting confused about what the plan is on if he is going to get a defibrillator or not. Reassured Yelitza that I have been speaking with pt's wife, Janeth. Sister notes that they are having some issues right now and would like to have RN please follow up with pt directly. Informed on POC per Dr. Campos and pt is suppose to have evaluation [facility] for [facility] for defibrillator. Yelitza states understanding, reassured her that I will try to find date scheduled for appointment with [facility]. She is appreciative of assistance. Informed her that I will call pt to FU.     Pt's direct line #[phone].   S/w Nurys, jen l/m for [facility] scheduling to FU on appointment.

## 2018-07-02 ENCOUNTER — TELEPHONE (OUTPATIENT)
Dept: CARDIOLOGY | Facility: MEDICAL CENTER | Age: 63
End: 2018-07-02

## 2018-07-02 NOTE — TELEPHONE ENCOUNTER
Fax number #[phone] Attn: [facility] for customer Yelitza Caruso. Reassured Yelitza that letter will be sent today. She is appreciative of assistance and will call back if any issues.     Letter written and faxed to provided number.     JULIO Levin R.N.   Caller: Unspecified (Today, 10:43 AM)      Yes, please! Tx!      Naomi Pham R.N.      AGUS/Rosario     Patient's sister, Yelitza, is returning your call and can be reached at [phone].

## 2018-07-02 NOTE — TELEPHONE ENCOUNTER
S/w Yelitza. Requesting letter from provider to note that pt will be getting his defibrillator on 7/5 and will be in the hospital for two days in order for them to get discounted [facility] rate at [employer]. She will call back with fax number to sent letter.     To homero Reyes to write letter? Thank you.    ----- Message from Norma Valerio sent at 7/2/2018  9:58 AM PDT -----  Regarding: letter for hospital rate at [facility]  [facility]/Rosario    Pt's sister Yelitza Caruso is calling to request a letter. Pt is scheduled to have defibrillator put in on 07/05. Family is asking for a letter so they can get hospital rate at [facility]. She can be reached at [phone].

## 2018-07-02 NOTE — LETTER
2018        To Whom it May Concern;    Migue Cervantes, :1955, is a current patient of our practice. He is to undergo surgery to receive a defibrillator pacemaker on 2018 and may be admitted to hospital for a minimum of two day.     Please allow family members to receive hospital rate at hotel. If you have any questions or concerns, please call our office at [phone].    Thank you.    Sincerely,            ________________________________     Evelyn Campos MD

## 2018-07-16 ENCOUNTER — TELEPHONE (OUTPATIENT)
Dept: CARDIOLOGY | Facility: MEDICAL CENTER | Age: 63
End: 2018-07-16

## 2018-07-16 NOTE — TELEPHONE ENCOUNTER
Message   Received: Today   Message Contents   JULIO Levin R.N.   Caller: Unspecified (Today,  8:48 AM)             Plse call him back for me, tell him I agree with everything you said, no soda!  Also, take 2 of the lasix dose for the next 3 days (1 dose in am and 1 dose early afternoon), ensure weight comes back to 234.   Excellent note!   Tx!   LS        Pt notified of recommendations. He repeated instructions and states understands. He will call back if his weight is not back to 234 after 3 days of increased dose of diuretic if it is he will go back to 1 tab daily. .

## 2018-07-16 NOTE — TELEPHONE ENCOUNTER
"swelling, water retention   Received: Today   Message Contents   Thais Salinas R.N.   Phone Number: [phone]             [facility]/an     Pt calling to report since defibrillator was implanted he has been retaining fluid.  Pt has swelling from calves to feet.  In general pt feels well.  Please call pt at .      1120: pt called back. Number was incorrect. Contact numbers updated in chart.        As stated above pt reporting problems with fluid retention for past 2-3 days. Takes Lasix 40mg daily but took an extra pill in the afternoon yesterday. States he's voiding \"plenty\" but wonders if he should decrease his fluid intake. Pt admits to drinking a lot of soda and cola throughout the day. Educated pt about the harmfulness of high sugar content drinks and encouraged 2L of water intake with very little to no soda.     -Weight: 237lbs this morning, baseline weight 230-234lbs  -Diet: states he has been eating more than he should but trying to keep sodium intake low.   -Generally feeling well but maybe slight increase in SOB.     Also pt asked about his defibrillator readings and when he should get this checked. [facility] up. He said he has the number to contact her.     To Dr. Campos: please advise fluid retention    "

## 2018-08-13 ENCOUNTER — TELEPHONE (OUTPATIENT)
Dept: CARDIOLOGY | Facility: MEDICAL CENTER | Age: 63
End: 2018-08-13

## 2018-08-13 NOTE — TELEPHONE ENCOUNTER
Called patient to find out if he ever had the blood work done that was ordered by [facility] in 05/2018. Patient states he just [facility] ([facility]18[facility] and  they  told him to let us know everything looked fine. Patient has a FV with LS on 8/14/18 @ 9:40am*SOY

## 2018-08-14 ENCOUNTER — OFFICE VISIT (OUTPATIENT)
Dept: CARDIOLOGY | Facility: PHYSICIAN GROUP | Age: 63
End: 2018-08-14
Payer: MEDICARE

## 2018-08-14 VITALS
WEIGHT: 229 LBS | OXYGEN SATURATION: 92 % | BODY MASS INDEX: 31.02 KG/M2 | HEART RATE: 70 BPM | SYSTOLIC BLOOD PRESSURE: 120 MMHG | HEIGHT: 72 IN | DIASTOLIC BLOOD PRESSURE: 78 MMHG

## 2018-08-14 DIAGNOSIS — I50.22 CHRONIC SYSTOLIC CONGESTIVE HEART FAILURE (HCC): ICD-10-CM

## 2018-08-14 DIAGNOSIS — Z87.891 FORMER TOBACCO USE: ICD-10-CM

## 2018-08-14 DIAGNOSIS — I10 ESSENTIAL HYPERTENSION: ICD-10-CM

## 2018-08-14 DIAGNOSIS — Z95.5 STATUS POST INSERTION OF DRUG-ELUTING STENT INTO LEFT ANTERIOR DESCENDING ARTERY: ICD-10-CM

## 2018-08-14 DIAGNOSIS — I25.5 ISCHEMIC CARDIOMYOPATHY: ICD-10-CM

## 2018-08-14 DIAGNOSIS — I25.10 CORONARY ARTERY DISEASE INVOLVING NATIVE CORONARY ARTERY OF NATIVE HEART WITHOUT ANGINA PECTORIS: ICD-10-CM

## 2018-08-14 DIAGNOSIS — Z51.81 ENCOUNTER FOR MONITORING DIURETIC THERAPY: ICD-10-CM

## 2018-08-14 DIAGNOSIS — Z79.899 ENCOUNTER FOR MONITORING DIURETIC THERAPY: ICD-10-CM

## 2018-08-14 DIAGNOSIS — I25.2 HISTORY OF ST ELEVATION MYOCARDIAL INFARCTION (STEMI): ICD-10-CM

## 2018-08-14 DIAGNOSIS — E78.5 DYSLIPIDEMIA: ICD-10-CM

## 2018-08-14 PROCEDURE — 93000 ELECTROCARDIOGRAM COMPLETE: CPT | Performed by: INTERNAL MEDICINE

## 2018-08-14 PROCEDURE — 99215 OFFICE O/P EST HI 40 MIN: CPT | Performed by: INTERNAL MEDICINE

## 2018-08-14 ASSESSMENT — ENCOUNTER SYMPTOMS
SHORTNESS OF BREATH: 0
NECK PAIN: 1
PND: 0
HEADACHES: 0
DIZZINESS: 0
CLAUDICATION: 0
PALPITATIONS: 0
ORTHOPNEA: 0

## 2018-08-14 NOTE — PROGRESS NOTES
"Subjective:   Chief Complaint:   Chief Complaint   Patient presents with   • CHF (Acute)       Migue Cervantes is a 62 y.o. male who presents today for follow-up of coronary artery disease.  He had an ST elevation MI in September 2017 with stenting to the LAD. His postprocedure echo had an EF of 50%.  Nuclear perfusion stress test showed a fixed LAD territory defect and his echocardiogram showed low EF at 30-35% with aneurysm of the anterior wall.      The day of his MI he awoke at 2 AM with severe burning and chest pressure.  He did not present to the emergency room for several hours.  [facility] and was having chest pain on the table but had relief of symptoms when his stent was placed.  I had discussed the case with Dr. Medeiros who agreed the patient was high risk for a fatal arrhythmia and was a candidate for an ICD.    Since last visit his weight is down about 1 kg, 2.2 pounds. [facility] (\"everything is ok\"). Had his ICD with Dr. Medeiros in July, downloads to the [facility], I do not have the records.  [facility] due to shortness of breath and thought was an exposure from 1 of his grandchildren.  He was given a azithromycin and prednisone and we went over this today.  He is feeling much better since wearing his oxygen continuously.    No chest pain, no lower extremity edema, no palpitations, no ICD shocks.    DATA REVIEWED by me: see HPI  ECG,  9-11-17   Old anterior and and steep T-wave inversion1.      ECG 3-15-18  Old anterior MI and very small T wave changes anterior leads.    Echo April 17, 2018, EF 30-35%, anterior apical akinesis and anterior wall aneurysm more, mildly dilated left atrium, aortic valve sclerosis without stenosis, mild AI, RV poorly visualized but grossly normal in size and function, mild TR 45-50 mmHg    Nuclear perfusion stress test April 18, 2018  Perfusion defect in the anterior wall, apex and septum, LVEF calculated " at 42%, abnormal wall motion    [facility] 9-11-17:  -Coronary stent implantation proximal left anterior descending artery 4.0 x 20 mm drug-eluting [employer]'s energy stent.  -Left anterior descending artery proximal 100% occlusion.  -Left ventricular ejection fraction 45% with anterior apical akinesis.    [facility].      Most recent labs:    5-1-18   Na+ 138, potassium 4, creatinine 1.06, BUN 9    9-13-17, Hgb 15, plt 177, K 4.3, Cr 0.82    Past Medical History:   Diagnosis Date   • Coronary artery disease due to lipid rich plaque    • Dyslipidemia    • Essential hypertension    • Former tobacco use - quit Sep 2017 in setting of STEMI    • Heart burn    • History of ST elevation myocardial infarction (STEMI) LAD September 2017    • Indigestion    • Pain 09-20-12    lower back, 8/10   • Status post insertion of drug-eluting stent into left anterior descending artery      Past Surgical History:   Procedure Laterality Date   • MARQUISE BY LAPAROSCOPY N/A 12/28/2015    Procedure: MARQUISE BY LAPAROSCOPY- With Grams ;  Surgeon: Sandro Obregon M.D.;  Location: SURGERY [facility];  Service:    • LUMBAR DECOMPRESSION  9/25/2012    by Dr. Plata   • LUMBAR FUSION POSTERIOR  9/25/2012    Performed by Mckayla Plata M.D. at SURGERY [facility]   • LUMBAR DECOMPRESSION  9/25/2012    Performed by Mckayla Plata M.D. at [facility]   • CERVICAL DISK AND FUSION ANTERIOR  1997/2010    x 2     Family History   Problem Relation Age of Onset   • Heart Attack Father    • Stroke Father    • Heart Attack Paternal Grandmother    • Heart Attack Paternal Grandfather      Social History     Social History   • Marital status:      Spouse name: N/A   • Number of children: N/A   • Years of education: N/A     Occupational History   • Not on file.     Social History Main Topics   • Smoking status: Former Smoker     Packs/day: 2.00     Years: 35.00     Types: Cigarettes     Quit date:  9/11/2017   • Smokeless tobacco: Never Used   • Alcohol use No      Comment: hx; quit 2008   • Drug use: No   • Sexual activity: Not on file     Other Topics Concern   • Not on file     Social History Narrative   • No narrative on file     Allergies   Allergen Reactions   • Nkda [No Known Drug Allergy]        Current Outpatient Prescriptions   Medication Sig Dispense Refill   • lisinopril (PRINIVIL) 10 MG Tab Take 1 Tab by mouth every day. 90 Tab 11   • carvedilol (COREG) 25 MG Tab Take 1 Tab by mouth 2 times a day, with meals. 180 Tab 3   • spironolactone (ALDACTONE) 25 MG Tab Take 1 Tab by mouth every day. 90 Tab 3   • furosemide (LASIX) 40 MG Tab Take 1 Tab by mouth every day. 30 Tab 11   • BUDESONIDE PO Take  by mouth.     • omeprazole (PRILOSEC) 40 MG delayed-release capsule Take 40 mg by mouth every day.     • sertraline (ZOLOFT) 50 MG Tab Take 50 mg by mouth every day.     • traZODone (DESYREL) 50 MG Tab Take 50 mg by mouth every evening.     • aspirin EC 81 MG EC tablet Take 1 Tab by mouth every day. 30 Tab 3   • atorvastatin (LIPITOR) 40 MG Tab Take 1 Tab by mouth every bedtime. 30 Tab 3   • oxycodone immediate-release (ROXICODONE) 5 MG Tab Take 1 Tab by mouth every four hours as needed for Severe Pain. 30 Tab 0   • lactobacillus granules (LACTINEX/FLORANEX) PACK Take 1 Packet by mouth 3 times a day, with meals. 30 Packet 0     No current facility-administered medications for this visit.        Review of Systems   Constitutional: Negative for malaise/fatigue.   Respiratory: Negative for shortness of breath.    Cardiovascular: Negative for chest pain, palpitations, orthopnea, claudication, leg swelling and PND.   Musculoskeletal: Positive for neck pain.   Neurological: Negative for dizziness and headaches.     All others systems reviewed and negative.     Objective:     Blood pressure 120/78, pulse 70, height 1.829 m (6'), weight 103.9 kg (229 lb), SpO2 92 %. Body mass index is 31.06 kg/m².    Physical Exam    General: No acute distress. Well nourished.  HEENT: EOM grossly intact, no scleral icterus, no pharyngeal erythema.   Neck:  No JVD, no bruits, trachea midline  CVS: RRR. Normal S1, S2. No M/R/G. No LE edema.  2+ radial pulses, 2+ DT pulses, ICD pocket healed well  Resp: CTAB. No wheezing or crackles/rhonchi. Normal respiratory effort. Mild prolonged exp phase  Abdomen: Soft, NT, no pallavi hepatomegaly.  MSK/Ext: No clubbing or cyanosis.  Skin: Warm and dry, no rashes.  Neurological: CN III-XII grossly intact. No focal deficits.   Psych: A&O x 3, appropriate affect, good judgement        Assessment:     1. Chronic systolic congestive heart failure (HCC)     2. Coronary artery disease involving native coronary artery of native heart without angina pectoris     3. History of ST elevation myocardial infarction (STEMI) LAD September 2017     4. Essential hypertension     5. Dyslipidemia     6. Former tobacco use - quit Sep 2017 in setting of STEMI     7. Status post insertion of drug-eluting stent into left anterior descending artery     8. Encounter for monitoring diuretic therapy     9. Ischemic cardiomyopathy         Medical Decision Making:  Today's Assessment / Status / Plan:     1.  CAD status post LAD territory MI 2017 with stent to prox LAD for 100% occlusion, 4x20 mm EBONIE, only luminal irregularities RCA/Circ. DAPT through 9-  2.  Ischemic CMO with an EF of 30-35% with anterior wall motion abnormalities and anterior aneurysm  3.  Chronic HFrEF, Stage C, [place] heart class II symptoms, better  4.  Hypertension, controlled  5.  Dyslipidemia, tolerating statin therapy  6.  Gastroesophageal reflux disease, on proton pump inhibitor  7.  COPD, chronic O2 now  8. Prior Tobacco use- still not smoking  9. Dyspnea, likely related to CHF and COPD  10. High risk medication- spironolactone  11. ICD, primary prevention, placed in July 2018 [facility], [employer], just had download with Sandy at the [facility], has a  home system  12. Anemia, per pt report, could be iron loss on DAPT  13. Bronchitis, new, on steroids and justaro    PLAN:  -RTC 3 months, will order another BMP for K level at that time  -PCP to monitor anemia  -Stop Effient in 1 month, written instructions given  -Cont to check daily weights in the morning, warned he may gain weight on prednisone  -We might need to take over downloads from the [facility] after Sandy retires, will have my/Dr. Medeiros's staff look into this if needed.  -Pt knows to call [facility] for anything acute/new  -I will request blood work from [facility] for review  -Ewa is assoc with arrhythmias but he has an ICD        Return in about 3 months (around 11/14/2018).    It is my pleasure to participate in the care of Mr. Cervantes.  Please do not hesitate to contact me with questions or concerns.    Evelyn Campos MD, [facility]  Cardiologist [facility]

## 2018-08-14 NOTE — LETTER
"     [facility] [address]  Phone: [phone]  Fax: [phone]              Migue Cervantes  1955    Encounter Date: 8/14/2018    Evelyn Campos M.D.          PROGRESS NOTE:  Subjective:   Chief Complaint:   Chief Complaint   Patient presents with   • CHF (Acute)       Migue Cervantes is a 62 y.o. male who presents today for follow-up of coronary artery disease.  He had an ST elevation MI in September 2017 with stenting to the LAD. His postprocedure echo had an EF of 50%.  Nuclear perfusion stress test showed a fixed LAD territory defect and his echocardiogram showed low EF at 30-35% with aneurysm of the anterior wall.      The day of his MI he awoke at 2 AM with severe burning and chest pressure.  He did not present to the emergency room for several hours.  He was care flighted to [facility] and was having chest pain on the table but had relief of symptoms when his stent was placed.  I had discussed the case with Dr. Medeiros who agreed the patient was high risk for a fatal arrhythmia and was a candidate for an ICD.    Since last visit his weight is down about 1 kg, 2.2 pounds. Just [facility] (\"everything is ok\"). Had his ICD with Dr. Medeiros in July, downloads to the [facility], I do not have the records.  [facility] due to shortness of breath and thought was an exposure from 1 of his grandchildren.  He was given a azithromycin and prednisone and we went over this today.  He is feeling much better since wearing his oxygen continuously.    No chest pain, no lower extremity edema, no palpitations, no ICD shocks.    DATA REVIEWED by me: see HPI  ECG,  9-11-17   Old anterior and and steep T-wave inversion1.      ECG 3-15-18  Old anterior MI and very small T wave changes anterior leads.    Echo April 17, 2018, EF 30-35%, anterior apical akinesis and anterior wall aneurysm more, " mildly dilated left atrium, aortic valve sclerosis without stenosis, mild AI, RV poorly visualized but grossly normal in size and function, mild TR 45-50 mmHg    Nuclear perfusion stress test April 18, 2018  Perfusion defect in the anterior wall, apex and septum, LVEF calculated at 42%, abnormal wall motion    [facility] 9-11-17:  -Coronary stent implantation proximal left anterior descending artery 4.0 x 20 mm drug-eluting [employer]'s energy stent.  -Left anterior descending artery proximal 100% occlusion.  -Left ventricular ejection fraction 45% with anterior apical akinesis.    [facility]. Medeiros.      Most recent labs:    5-1-18   Na+ 138, potassium 4, creatinine 1.06, BUN 9    9-13-17, Hgb 15, plt 177, K 4.3, Cr 0.82    Past Medical History:   Diagnosis Date   • Coronary artery disease due to lipid rich plaque    • Dyslipidemia    • Essential hypertension    • Former tobacco use - quit Sep 2017 in setting of STEMI    • Heart burn    • History of ST elevation myocardial infarction (STEMI) LAD September 2017    • Indigestion    • Pain 09-20-12    lower back, 8/10   • Status post insertion of drug-eluting stent into left anterior descending artery      Past Surgical History:   Procedure Laterality Date   • MARQUISE BY LAPAROSCOPY N/A 12/28/2015    Procedure: MARQUISE BY LAPAROSCOPY- With Grams ;  Surgeon: Sandro Obregon M.D.;  Location: [facility];  Service:    • LUMBAR DECOMPRESSION  9/25/2012    by Dr. Plata   • LUMBAR FUSION POSTERIOR  9/25/2012    Performed by Mckayla Plata M.D. at SURGERY [facility]   • LUMBAR DECOMPRESSION  9/25/2012    Performed by Mckayla Plata M.D. at [facility]   • CERVICAL DISK AND FUSION ANTERIOR  1997/2010    x 2     Family History   Problem Relation Age of Onset   • Heart Attack Father    • Stroke Father    • Heart Attack Paternal Grandmother    • Heart Attack Paternal Grandfather      Social History     Social History   • Marital  status:      Spouse name: N/A   • Number of children: N/A   • Years of education: N/A     Occupational History   • Not on file.     Social History Main Topics   • Smoking status: Former Smoker     Packs/day: 2.00     Years: 35.00     Types: Cigarettes     Quit date: 9/11/2017   • Smokeless tobacco: Never Used   • Alcohol use No      Comment: hx; quit 2008   • Drug use: No   • Sexual activity: Not on file     Other Topics Concern   • Not on file     Social History Narrative   • No narrative on file     Allergies   Allergen Reactions   • Nkda [No Known Drug Allergy]        Current Outpatient Prescriptions   Medication Sig Dispense Refill   • lisinopril (PRINIVIL) 10 MG Tab Take 1 Tab by mouth every day. 90 Tab 11   • carvedilol (COREG) 25 MG Tab Take 1 Tab by mouth 2 times a day, with meals. 180 Tab 3   • spironolactone (ALDACTONE) 25 MG Tab Take 1 Tab by mouth every day. 90 Tab 3   • furosemide (LASIX) 40 MG Tab Take 1 Tab by mouth every day. 30 Tab 11   • BUDESONIDE PO Take  by mouth.     • omeprazole (PRILOSEC) 40 MG delayed-release capsule Take 40 mg by mouth every day.     • sertraline (ZOLOFT) 50 MG Tab Take 50 mg by mouth every day.     • traZODone (DESYREL) 50 MG Tab Take 50 mg by mouth every evening.     • aspirin EC 81 MG EC tablet Take 1 Tab by mouth every day. 30 Tab 3   • atorvastatin (LIPITOR) 40 MG Tab Take 1 Tab by mouth every bedtime. 30 Tab 3   • oxycodone immediate-release (ROXICODONE) 5 MG Tab Take 1 Tab by mouth every four hours as needed for Severe Pain. 30 Tab 0   • lactobacillus granules (LACTINEX/FLORANEX) PACK Take 1 Packet by mouth 3 times a day, with meals. 30 Packet 0     No current facility-administered medications for this visit.        Review of Systems   Constitutional: Negative for malaise/fatigue.   Respiratory: Negative for shortness of breath.    Cardiovascular: Negative for chest pain, palpitations, orthopnea, claudication, leg swelling and PND.   Musculoskeletal: Positive  for neck pain.   Neurological: Negative for dizziness and headaches.     All others systems reviewed and negative.     Objective:     Blood pressure 120/78, pulse 70, height 1.829 m (6'), weight 103.9 kg (229 lb), SpO2 92 %. Body mass index is 31.06 kg/m².    Physical Exam   General: No acute distress. Well nourished.  HEENT: EOM grossly intact, no scleral icterus, no pharyngeal erythema.   Neck:  No JVD, no bruits, trachea midline  CVS: RRR. Normal S1, S2. No M/R/G. No LE edema.  2+ radial pulses, 2+ DT pulses, ICD pocket healed well  Resp: CTAB. No wheezing or crackles/rhonchi. Normal respiratory effort. Mild prolonged exp phase  Abdomen: Soft, NT, no pallavi hepatomegaly.  MSK/Ext: No clubbing or cyanosis.  Skin: Warm and dry, no rashes.  Neurological: CN III-XII grossly intact. No focal deficits.   Psych: A&O x 3, appropriate affect, good judgement        Assessment:     1. Chronic systolic congestive heart failure (HCC)     2. Coronary artery disease involving native coronary artery of native heart without angina pectoris     3. History of ST elevation myocardial infarction (STEMI) LAD September 2017     4. Essential hypertension     5. Dyslipidemia     6. Former tobacco use - quit Sep 2017 in setting of STEMI     7. Status post insertion of drug-eluting stent into left anterior descending artery     8. Encounter for monitoring diuretic therapy     9. Ischemic cardiomyopathy         Medical Decision Making:  Today's Assessment / Status / Plan:     1.  CAD status post LAD territory MI 2017 with stent to prox LAD for 100% occlusion, 4x20 mm EBONIE, only luminal irregularities RCA/Circ. DAPT through 9-  2.  Ischemic CMO with an EF of 30-35% with anterior wall motion abnormalities and anterior aneurysm  3.  Chronic HFrEF, Stage C, [place] heart class II symptoms, better  4.  Hypertension, controlled  5.  Dyslipidemia, tolerating statin therapy  6.  Gastroesophageal reflux disease, on proton pump inhibitor  7.   COPD, chronic O2 now  8. Prior Tobacco use- still not smoking  9. Dyspnea, likely related to CHF and COPD  10. High risk medication- spironolactone  11. ICD, primary prevention, placed in July 2018 [facility], [employer], just [facility] the [facility], has a home system  12. Anemia, per pt report, could be iron loss on DAPT  13. Bronchitis, new, on steroids and azithro    PLAN:  -RTC 3 months, will order another BMP for K level at that time  -PCP to monitor anemia  -Stop Effient in 1 month, written instructions given  -Cont to check daily weights in the morning, warned he may gain weight on prednisone  -We might need to take over downloads from the [facility] after Sandy retires, will have my/Dr. Medeiros's staff look into this if needed.  -Pt knows to call [facility] for anything acute/new  -I will request blood work from [facility] for review  -Ewa is assoc with arrhythmias but he has an ICD        Return in about 3 months (around 11/14/2018).    It is my pleasure to participate in the care of Mr. Cervantes.  Please do not hesitate to contact me with questions or concerns.    Evelyn Campos MD, [facility]  Cardiologist [facility]      Skinny Garcia M.D.  [address] [place] [id number]  VIA Facsimile: [phone]

## 2018-08-15 ENCOUNTER — TELEPHONE (OUTPATIENT)
Dept: CARDIOLOGY | Facility: MEDICAL CENTER | Age: 63
End: 2018-08-15

## 2018-08-15 NOTE — TELEPHONE ENCOUNTER
[facility] @[phone]    -Records received and given to LS to review.    ----- Message from Duane R. Scott, Med Ass't sent at 8/14/2018  2:17 PM PDT -----  I think it may have been [facility] at the [facility].    ----- Message -----  From: Rosario Pham R.N.  Sent: 8/14/2018  12:14 PM  To: Duane R. Scott, Med Ass't    Do you know where pt had his blood work done?    ----- Message -----  From: Evelyn Campos M.D.  Sent: 8/14/2018  10:15 AM  To: Rosario Pham R.N.    Any chance we can get a copy of blood work done 2 days ago and his ICD placement note by Dr. Medeiros from [facility] from July? :)  L

## 2018-10-04 ENCOUNTER — TELEPHONE (OUTPATIENT)
Dept: CARDIOLOGY | Facility: MEDICAL CENTER | Age: 63
End: 2018-10-04

## 2018-10-04 NOTE — TELEPHONE ENCOUNTER
"Called pt to follow up. He notes that he has been having as sharp pain on the right side of his neck that goes up to his head intermittently and he feels it is getting worse. When pain happens he reports he gets a little dizzy and lightheaded. States that a long time ago he had 2 US done through the [facility] to evaluate his carotid arteries but the test came back \"normal\". Pt unsure if pain is heart related and just wanted to FU with LS recommendations.     To LS, please advise.     ----- Message from Norma Valerio sent at 10/4/2018 11:23 AM PDT -----  Regarding: neck pain is happening more frequently  LS/Rosario    Patient said the pain that goes from his neck to his brain is increasing in frequency. Please call him back at [phone].    "

## 2018-10-05 NOTE — TELEPHONE ENCOUNTER
I don't think it is a cardiac issue. It actually sounds like a musculoskeletal issue.  Funny enough, I have the same issue and if I massage it out I can get very dizzy/lightheaded from the pain, but it isn't harmful, tell him to do neck stretches and massage it out. :-)  LS

## 2018-11-06 ENCOUNTER — TELEPHONE (OUTPATIENT)
Dept: CARDIOLOGY | Facility: MEDICAL CENTER | Age: 63
End: 2018-11-06

## 2018-11-06 NOTE — TELEPHONE ENCOUNTER
I spoke to the patient regarding recent labs and imaging. The [facility]. I will fax a request, and the patient will try to obtain and bring to appt.

## 2018-11-15 ENCOUNTER — OFFICE VISIT (OUTPATIENT)
Dept: CARDIOLOGY | Facility: PHYSICIAN GROUP | Age: 63
End: 2018-11-15
Payer: MEDICARE

## 2018-11-15 VITALS
HEIGHT: 72 IN | BODY MASS INDEX: 29.12 KG/M2 | HEART RATE: 68 BPM | WEIGHT: 215 LBS | DIASTOLIC BLOOD PRESSURE: 60 MMHG | OXYGEN SATURATION: 90 % | SYSTOLIC BLOOD PRESSURE: 84 MMHG

## 2018-11-15 DIAGNOSIS — I25.10 CORONARY ARTERY DISEASE INVOLVING NATIVE CORONARY ARTERY OF NATIVE HEART WITHOUT ANGINA PECTORIS: ICD-10-CM

## 2018-11-15 DIAGNOSIS — I50.22 CHRONIC SYSTOLIC CONGESTIVE HEART FAILURE (HCC): ICD-10-CM

## 2018-11-15 DIAGNOSIS — Z95.5 STATUS POST INSERTION OF DRUG-ELUTING STENT INTO LEFT ANTERIOR DESCENDING ARTERY: ICD-10-CM

## 2018-11-15 DIAGNOSIS — E78.5 DYSLIPIDEMIA: ICD-10-CM

## 2018-11-15 DIAGNOSIS — Z79.899 ENCOUNTER FOR MONITORING DIURETIC THERAPY: ICD-10-CM

## 2018-11-15 DIAGNOSIS — Z51.81 ENCOUNTER FOR MONITORING DIURETIC THERAPY: ICD-10-CM

## 2018-11-15 DIAGNOSIS — I10 ESSENTIAL HYPERTENSION: ICD-10-CM

## 2018-11-15 DIAGNOSIS — I25.5 ISCHEMIC CARDIOMYOPATHY: ICD-10-CM

## 2018-11-15 DIAGNOSIS — I25.2 HISTORY OF ST ELEVATION MYOCARDIAL INFARCTION (STEMI): ICD-10-CM

## 2018-11-15 DIAGNOSIS — Z87.891 FORMER TOBACCO USE: ICD-10-CM

## 2018-11-15 PROCEDURE — 99215 OFFICE O/P EST HI 40 MIN: CPT | Performed by: INTERNAL MEDICINE

## 2018-11-15 RX ORDER — LISINOPRIL 2.5 MG/1
2.5 TABLET ORAL DAILY
Qty: 30 TAB | Refills: 3 | Status: SHIPPED | OUTPATIENT
Start: 2018-11-15

## 2018-11-15 ASSESSMENT — ENCOUNTER SYMPTOMS
CLAUDICATION: 0
NECK PAIN: 1
SHORTNESS OF BREATH: 0
HEADACHES: 0
DIZZINESS: 0
PND: 0
PALPITATIONS: 0
ORTHOPNEA: 0

## 2018-11-15 NOTE — PATIENT INSTRUCTIONS
-Stop lisinopril 10 mg, start lisinopril 2.5 mg.  -If your blood pressure remains less than 90/60, cut carvedilol 25 mg in half and take 12.5 mg a.m. and p.m. and call the office  -Renal office [phone]

## 2018-11-15 NOTE — LETTER
[facility] [address]  Phone: [phone]  Fax: [phone]              Migue Cervantes  1955    Encounter Date: 11/15/2018    Evelyn Campos M.D.          PROGRESS NOTE:  Subjective:   Chief Complaint:   Chief Complaint   Patient presents with   • Congestive Heart Failure       Migue Cervantes is a 63 y.o. male who presents today for follow-up of coronary artery disease.  He had an ST elevation MI in September 2017 with stenting to the LAD. His postprocedure echo had an EF of 50%.  Nuclear perfusion stress test showed a fixed LAD territory defect and his echocardiogram showed low EF at 30-35% with aneurysm of the anterior wall.      The day of his MI he awoke at 2 AM with severe burning and chest pressure.  He did not present to the emergency room for several hours.  He was care flighted to [facility] and was having chest pain on the table but had relief of symptoms when his stent was placed.  I had discussed the case with Dr. Medeiros who agreed the patient was high risk for a fatal arrhythmia and was a candidate for an ICD.     Had his ICD with Dr. Medeiros in July, [facility],  device interrogated August 2018.    He is feeling much better since wearing his oxygen continuously.  Still having [place] heart class III shortness of breath which is probably multifactorial.  His blood pressures been on the softer side.  He is not had any pallavi dizziness or lightheadedness but is feeling fatigued.    No chest pain, no lower extremity edema, no palpitations, no ICD shocks, no ATP therapy.  [facility] showed [facility] creatinine and potassium.  He is not checking his weights every day but in general stays around 215 pounds and fluctuates slightly.  He takes extra as needed Lasix about 1 time a week for lower extremity swelling which appears to help him.    DATA REVIEWED by me: see HPI  ECG,  9-11-17   Old  anterior and and steep T-wave inversion1.      ECG 3-15-18  Old anterior MI and very small T wave changes anterior leads.    EKG 8/14/2018  Sinus, rate 87, normal EKG, resolution of prior old anterior MI pattern    Echo April 17, 2018, EF 30-35%, anterior apical akinesis and anterior wall aneurysm more, mildly dilated left atrium, aortic valve sclerosis without stenosis, mild AI, RV poorly visualized but grossly normal in size and function, mild TR 45-50 mmHg    Nuclear perfusion stress test April 18, 2018  Perfusion defect in the anterior wall, apex and septum, LVEF calculated at 42%, abnormal wall motion    [facility] 9-11-17:  -Coronary stent implantation proximal left anterior descending artery 4.0 x 20 mm drug-eluting [employer]'s energy stent.  -Left anterior descending artery proximal 100% occlusion.  -Left ventricular ejection fraction 45% with anterior apical akinesis.    [facility].  [facility] 8/13/2018  No episodes of VT, no V. fib, no ATP, right atrial paced 42%, V paced less than 1%    Most recent labs:    5-1-18   Na+ 138, potassium 4, creatinine 1.06, BUN 9    9-13-17, Hgb 15, plt 177, K 4.3, Cr 0.82    August 13, 2018 hemoglobin 12, platelets 181, glucose 132, creatinine 1, sodium 139, potassium 3.8    Past Medical History:   Diagnosis Date   • Coronary artery disease due to lipid rich plaque    • Dyslipidemia    • Essential hypertension    • Former tobacco use - quit Sep 2017 in setting of STEMI    • Heart burn    • History of ST elevation myocardial infarction (STEMI) LAD September 2017    • Indigestion    • Pain 09-20-12    lower back, 8/10   • Status post insertion of drug-eluting stent into left anterior descending artery      Past Surgical History:   Procedure Laterality Date   • MARQUISE BY LAPAROSCOPY N/A 12/28/2015    Procedure: MARQUISE BY LAPAROSCOPY- With Grams ;  Surgeon: Sandro Obregon M.D.;  Location: SURGERY [facility];   Service:    • LUMBAR DECOMPRESSION  9/25/2012    by Dr. Plata   • LUMBAR FUSION POSTERIOR  9/25/2012    Performed by Mckayla Plata M.D. at SURGERY [facility]   • LUMBAR DECOMPRESSION  9/25/2012    Performed by Mckayla Plata M.D. at SURGERY [facility]   • CERVICAL DISK AND FUSION ANTERIOR  1997/2010    x 2     Family History   Problem Relation Age of Onset   • Heart Attack Father    • Stroke Father    • Heart Attack Paternal Grandmother    • Heart Attack Paternal Grandfather      Social History     Social History   • Marital status:      Spouse name: N/A   • Number of children: N/A   • Years of education: N/A     Occupational History   • Not on file.     Social History Main Topics   • Smoking status: Former Smoker     Packs/day: 2.00     Years: 35.00     Types: Cigarettes     Quit date: 9/11/2017   • Smokeless tobacco: Never Used   • Alcohol use No      Comment: hx; quit 2008   • Drug use: No   • Sexual activity: Not on file     Other Topics Concern   • Not on file     Social History Narrative   • No narrative on file     Allergies   Allergen Reactions   • Nkda [No Known Drug Allergy]        Current Outpatient Prescriptions   Medication Sig Dispense Refill   • lisinopril (PRINIVIL) 2.5 MG Tab Take 1 Tab by mouth every day. 30 Tab 3   • carvedilol (COREG) 25 MG Tab Take 1 Tab by mouth 2 times a day, with meals. 180 Tab 3   • spironolactone (ALDACTONE) 25 MG Tab Take 1 Tab by mouth every day. 90 Tab 3   • furosemide (LASIX) 40 MG Tab Take 1 Tab by mouth every day. 30 Tab 11   • BUDESONIDE PO Take  by mouth.     • omeprazole (PRILOSEC) 40 MG delayed-release capsule Take 40 mg by mouth every day.     • sertraline (ZOLOFT) 50 MG Tab Take 50 mg by mouth every day.     • traZODone (DESYREL) 50 MG Tab Take 50 mg by mouth every evening.     • aspirin EC 81 MG EC tablet Take 1 Tab by mouth every day. 30 Tab 3   • atorvastatin (LIPITOR) 40 MG Tab Take 1 Tab by mouth every bedtime. 30 Tab 3   • oxycodone  immediate-release (ROXICODONE) 5 MG Tab Take 1 Tab by mouth every four hours as needed for Severe Pain. 30 Tab 0   • lactobacillus granules (LACTINEX/FLORANEX) PACK Take 1 Packet by mouth 3 times a day, with meals. 30 Packet 0     No current facility-administered medications for this visit.        Review of Systems   Constitutional: Negative for malaise/fatigue.   Respiratory: Negative for shortness of breath.    Cardiovascular: Negative for chest pain, palpitations, orthopnea, claudication, leg swelling and PND.   Musculoskeletal: Positive for neck pain.   Neurological: Negative for dizziness and headaches.     All others systems reviewed and negative.     Objective:     Blood pressure (!) 84/60, pulse 68, height 1.829 m (6'), weight 97.5 kg (215 lb), SpO2 90 %. Body mass index is 29.16 kg/m².    Physical Exam   General: No acute distress. Well nourished.  HEENT: EOM grossly intact, no scleral icterus, no pharyngeal erythema. Wearing O2  Neck:  No JVD, no bruits, trachea midline, scar right CEA,  CVS: RRR. Normal S1, S2. No M/R/G. No LE edema.  2+ radial pulses, 2+ DT pulses, ICD pocket healed well  Resp: CTAB. No wheezing or crackles/rhonchi. Normal respiratory effort. Mild prolonged exp phase  Abdomen: Soft, NT, no pallavi hepatomegaly.  MSK/Ext: No clubbing or cyanosis.  Skin: Warm and dry, no rashes.  Neurological: CN III-XII grossly intact. No focal deficits.   Psych: A&O x 3, appropriate affect, good judgement        Assessment:     1. Chronic systolic congestive heart failure (HCC)  BASIC METABOLIC PANEL   2. Coronary artery disease involving native coronary artery of native heart without angina pectoris     3. History of ST elevation myocardial infarction (STEMI) LAD September 2017     4. Essential hypertension     5. Dyslipidemia     6. Former tobacco use - quit Sep 2017 in setting of STEMI     7. Status post insertion of drug-eluting stent into left anterior descending artery     8. Encounter for monitoring  diuretic therapy     9. Ischemic cardiomyopathy         Medical Decision Making:  Today's Assessment / Status / Plan:     1.  CAD status post LAD territory MI 2017 with stent to prox LAD for 100% occlusion, 4x20 mm EBONIE, only luminal irregularities RCA/Circ. DAPT through 9-, has finished this  2.  Ischemic CMO with an EF of 30-35% with anterior wall motion abnormalities and anterior aneurysm  3.  Chronic HFrEF, Stage C, [place] heart class III symptoms, unchanged  4.  Hypertension, controlled  5.  Dyslipidemia, tolerating statin therapy  6.  Gastroesophageal reflux disease, on proton pump inhibitor  7.  COPD, chronic O2 now  8. Prior Tobacco use- still not smoking  9. Dyspnea, likely related to CHF and COPD  10. High risk medication- spironolactone  11. ICD, primary prevention, placed in July 2018 [facility], [employer], just had download with Sandy [facility] the [facility], has a home system, full interrogation August 2018 at the [facility].  12. Anemia, per pt report, could be iron loss on DAPT  13. Bronchitis, new, on steroids and azithro  14.  Iron deficiency anemia, improved with iron and off of Effient    PLAN:  -RTC 3 months  -BMP now and 6 months  -Continue as needed Lasix  -Stop lisionpril 10 mg, start 2.5 mg    Written instructions given today:  -Stop lisinopril 10 mg, start lisinopril 2.5 mg.  -If your blood pressure remains less than 90/60, cut carvedilol 25 mg in half and take 12.5 mg a.m. and p.m. and call the office  -Renal office [phone]    Return in about 3 months (around 2/15/2019).    It is my pleasure to participate in the care of Mr. Cervantes.  Please do not hesitate to contact me with questions or concerns.    Evelyn Campos MD, [facility]  Cardiologist [facility]      Skinny Garcia M.D.  [address] [place] [id number]  VIA Facsimile: [phone]

## 2018-11-15 NOTE — PROGRESS NOTES
Subjective:   Chief Complaint:   Chief Complaint   Patient presents with   • Congestive Heart Failure       Migue Cervantes is a 63 y.o. male who presents today for follow-up of coronary artery disease.  He had an ST elevation MI in September 2017 with stenting to the LAD. His postprocedure echo had an EF of 50%.  Nuclear perfusion stress test showed a fixed LAD territory defect and his echocardiogram showed low EF at 30-35% with aneurysm of the anterior wall.      The day of his MI he awoke at 2 AM with severe burning and chest pressure.  He did not present to the emergency room for several hours.  He was care flighted to [facility] and was having chest pain on the table but had relief of symptoms when his stent was placed.  I had discussed the case with Dr. Medeiros who agreed the patient was high risk for a fatal arrhythmia and was a candidate for an ICD.     Had his ICD with Dr. Medeiros in July, [facility],  device interrogated August 2018.    He is feeling much better since wearing his oxygen continuously.  Still having [place] heart class III shortness of breath which is probably multifactorial.  His blood pressures been on the softer side.  He is not had any pallavi dizziness or lightheadedness but is feeling fatigued.    No chest pain, no lower extremity edema, no palpitations, no ICD shocks, no ATP therapy.  [facility] showed stable creatinine and potassium.  He is not checking his weights every day but in general stays around 215 pounds and fluctuates slightly.  He takes extra as needed Lasix about 1 time a week for lower extremity swelling which appears to help him.    DATA REVIEWED by me: see HPI  ECG,  9-11-17   Old anterior and and steep T-wave inversion1.      ECG 3-15-18  Old anterior MI and very small T wave changes anterior leads.    EKG 8/14/2018  Sinus, rate 87, normal EKG, resolution of prior old anterior MI pattern    Echo April 17, 2018, EF 30-35%, anterior apical akinesis and  anterior wall aneurysm more, mildly dilated left atrium, aortic valve sclerosis without stenosis, mild AI, RV poorly visualized but grossly normal in size and function, mild TR 45-50 mmHg    Nuclear perfusion stress test April 18, 2018  Perfusion defect in the anterior wall, apex and septum, LVEF calculated at 42%, abnormal wall motion    [facility] 9-11-17:  -Coronary stent implantation proximal left anterior descending artery 4.0 x 20 mm drug-eluting [employer]'s energy stent.  -Left anterior descending artery proximal 100% occlusion.  -Left ventricular ejection fraction 45% with anterior apical akinesis.    [facility] July [facility]. Medeiros.  [facility] of the [facility] 8/13/2018  No episodes of VT, no V. fib, no ATP, right atrial paced 42%, V paced less than 1%    Most recent labs:    5-1-18   Na+ 138, potassium 4, creatinine 1.06, BUN 9    9-13-17, Hgb 15, plt 177, K 4.3, Cr 0.82    August 13, 2018 hemoglobin 12, platelets 181, glucose 132, creatinine 1, sodium 139, potassium 3.8    Past Medical History:   Diagnosis Date   • Coronary artery disease due to lipid rich plaque    • Dyslipidemia    • Essential hypertension    • Former tobacco use - quit Sep 2017 in setting of STEMI    • Heart burn    • History of ST elevation myocardial infarction (STEMI) LAD September 2017    • Indigestion    • Pain 09-20-12    lower back, 8/10   • Status post insertion of drug-eluting stent into left anterior descending artery      Past Surgical History:   Procedure Laterality Date   • MARQUISE BY LAPAROSCOPY N/A 12/28/2015    Procedure: MARQUISE BY LAPAROSCOPY- With Grams ;  Surgeon: Sandro Obregon M.D.;  Location: [facility];  Service:    • LUMBAR DECOMPRESSION  9/25/2012    by Dr. Plata   • LUMBAR FUSION POSTERIOR  9/25/2012    Performed by Mckayla Plata M.D. at SURGERY [facility]   • LUMBAR DECOMPRESSION  9/25/2012    Performed by Mckayla Plata M.D. at [facility]   • CERVICAL  DISK AND FUSION ANTERIOR  1997/2010    x 2     Family History   Problem Relation Age of Onset   • Heart Attack Father    • Stroke Father    • Heart Attack Paternal Grandmother    • Heart Attack Paternal Grandfather      Social History     Social History   • Marital status:      Spouse name: N/A   • Number of children: N/A   • Years of education: N/A     Occupational History   • Not on file.     Social History Main Topics   • Smoking status: Former Smoker     Packs/day: 2.00     Years: 35.00     Types: Cigarettes     Quit date: 9/11/2017   • Smokeless tobacco: Never Used   • Alcohol use No      Comment: hx; quit 2008   • Drug use: No   • Sexual activity: Not on file     Other Topics Concern   • Not on file     Social History Narrative   • No narrative on file     Allergies   Allergen Reactions   • Nkda [No Known Drug Allergy]        Current Outpatient Prescriptions   Medication Sig Dispense Refill   • lisinopril (PRINIVIL) 2.5 MG Tab Take 1 Tab by mouth every day. 30 Tab 3   • carvedilol (COREG) 25 MG Tab Take 1 Tab by mouth 2 times a day, with meals. 180 Tab 3   • spironolactone (ALDACTONE) 25 MG Tab Take 1 Tab by mouth every day. 90 Tab 3   • furosemide (LASIX) 40 MG Tab Take 1 Tab by mouth every day. 30 Tab 11   • BUDESONIDE PO Take  by mouth.     • omeprazole (PRILOSEC) 40 MG delayed-release capsule Take 40 mg by mouth every day.     • sertraline (ZOLOFT) 50 MG Tab Take 50 mg by mouth every day.     • traZODone (DESYREL) 50 MG Tab Take 50 mg by mouth every evening.     • aspirin EC 81 MG EC tablet Take 1 Tab by mouth every day. 30 Tab 3   • atorvastatin (LIPITOR) 40 MG Tab Take 1 Tab by mouth every bedtime. 30 Tab 3   • oxycodone immediate-release (ROXICODONE) 5 MG Tab Take 1 Tab by mouth every four hours as needed for Severe Pain. 30 Tab 0   • lactobacillus granules (LACTINEX/FLORANEX) PACK Take 1 Packet by mouth 3 times a day, with meals. 30 Packet 0     No current facility-administered medications for  this visit.        Review of Systems   Constitutional: Negative for malaise/fatigue.   Respiratory: Negative for shortness of breath.    Cardiovascular: Negative for chest pain, palpitations, orthopnea, claudication, leg swelling and PND.   Musculoskeletal: Positive for neck pain.   Neurological: Negative for dizziness and headaches.     All others systems reviewed and negative.     Objective:     Blood pressure (!) 84/60, pulse 68, height 1.829 m (6'), weight 97.5 kg (215 lb), SpO2 90 %. Body mass index is 29.16 kg/m².    Physical Exam   General: No acute distress. Well nourished.  HEENT: EOM grossly intact, no scleral icterus, no pharyngeal erythema. Wearing O2  Neck:  No JVD, no bruits, trachea midline, scar right CEA,  CVS: RRR. Normal S1, S2. No M/R/G. No LE edema.  2+ radial pulses, 2+ DT pulses, ICD pocket healed well  Resp: CTAB. No wheezing or crackles/rhonchi. Normal respiratory effort. Mild prolonged exp phase  Abdomen: Soft, NT, no pallavi hepatomegaly.  MSK/Ext: No clubbing or cyanosis.  Skin: Warm and dry, no rashes.  Neurological: CN III-XII grossly intact. No focal deficits.   Psych: A&O x 3, appropriate affect, good judgement        Assessment:     1. Chronic systolic congestive heart failure (HCC)  BASIC METABOLIC PANEL   2. Coronary artery disease involving native coronary artery of native heart without angina pectoris     3. History of ST elevation myocardial infarction (STEMI) LAD September 2017     4. Essential hypertension     5. Dyslipidemia     6. Former tobacco use - quit Sep 2017 in setting of STEMI     7. Status post insertion of drug-eluting stent into left anterior descending artery     8. Encounter for monitoring diuretic therapy     9. Ischemic cardiomyopathy         Medical Decision Making:  Today's Assessment / Status / Plan:     1.  CAD status post LAD territory MI 2017 with stent to prox LAD for 100% occlusion, 4x20 mm EBONIE, only luminal irregularities RCA/Circ. DAPT through  9-, has finished this  2.  Ischemic CMO with an EF of 30-35% with anterior wall motion abnormalities and anterior aneurysm  3.  Chronic HFrEF, Stage C, [place] heart class III symptoms, unchanged  4.  Hypertension, controlled  5.  Dyslipidemia, tolerating statin therapy  6.  Gastroesophageal reflux disease, on proton pump inhibitor  7.  COPD, chronic O2 now  8. Prior Tobacco use- still not smoking  9. Dyspnea, likely related to CHF and COPD  10. High risk medication- spironolactone  11. ICD, primary prevention, placed in July 2018 [facility], [employer], just had download with Sandy [facility], has a home system, full interrogation August 2018 at [facility].  12. Anemia, per pt report, could be iron loss on DAPT  13. Bronchitis, new, on steroids and azithro  14.  Iron deficiency anemia, improved with iron and off of Effient    PLAN:  -RTC 3 months  -BMP now and 6 months  -Continue as needed Lasix  -Stop lisionpril 10 mg, start 2.5 mg    Written instructions given today:  -Stop lisinopril 10 mg, start lisinopril 2.5 mg.  -If your blood pressure remains less than 90/60, cut carvedilol 25 mg in half and take 12.5 mg a.m. and p.m. and call the office  -Renal office [phone]    Return in about 3 months (around 2/15/2019).    It is my pleasure to participate in the care of Mr. Cervantes.  Please do not hesitate to contact me with questions or concerns.    Evelyn Campos MD, [facility]  Cardiologist [facility] for [facility]

## 2018-11-16 ENCOUNTER — TELEPHONE (OUTPATIENT)
Dept: CARDIOLOGY | Facility: MEDICAL CENTER | Age: 63
End: 2018-11-16

## 2018-11-16 NOTE — TELEPHONE ENCOUNTER
Evelyn Campos M.D.  Lizz Botello, R.N.             Please let him know sodium, potassium and creatinine look good on his blood work.   Thanks!      Results letter sent to pt.

## 2019-03-12 ENCOUNTER — OFFICE VISIT (OUTPATIENT)
Dept: CARDIOLOGY | Facility: PHYSICIAN GROUP | Age: 64
End: 2019-03-12
Payer: MEDICARE

## 2019-03-12 VITALS
WEIGHT: 245 LBS | BODY MASS INDEX: 33.18 KG/M2 | HEIGHT: 72 IN | SYSTOLIC BLOOD PRESSURE: 138 MMHG | OXYGEN SATURATION: 93 % | DIASTOLIC BLOOD PRESSURE: 80 MMHG | HEART RATE: 80 BPM

## 2019-03-12 DIAGNOSIS — Z79.899 ENCOUNTER FOR MONITORING DIURETIC THERAPY: ICD-10-CM

## 2019-03-12 DIAGNOSIS — I25.10 CORONARY ARTERY DISEASE INVOLVING NATIVE CORONARY ARTERY OF NATIVE HEART WITHOUT ANGINA PECTORIS: ICD-10-CM

## 2019-03-12 DIAGNOSIS — I25.5 ISCHEMIC CARDIOMYOPATHY: ICD-10-CM

## 2019-03-12 DIAGNOSIS — Z95.5 STATUS POST INSERTION OF DRUG-ELUTING STENT INTO LEFT ANTERIOR DESCENDING ARTERY: ICD-10-CM

## 2019-03-12 DIAGNOSIS — Z51.81 ENCOUNTER FOR MONITORING DIURETIC THERAPY: ICD-10-CM

## 2019-03-12 DIAGNOSIS — E78.5 DYSLIPIDEMIA: ICD-10-CM

## 2019-03-12 DIAGNOSIS — Z87.891 FORMER TOBACCO USE: ICD-10-CM

## 2019-03-12 DIAGNOSIS — J43.2 CENTRILOBULAR EMPHYSEMA (HCC): ICD-10-CM

## 2019-03-12 DIAGNOSIS — I10 ESSENTIAL HYPERTENSION: ICD-10-CM

## 2019-03-12 DIAGNOSIS — I50.22 CHRONIC SYSTOLIC CONGESTIVE HEART FAILURE (HCC): ICD-10-CM

## 2019-03-12 DIAGNOSIS — I25.2 HISTORY OF ST ELEVATION MYOCARDIAL INFARCTION (STEMI): ICD-10-CM

## 2019-03-12 PROCEDURE — 99214 OFFICE O/P EST MOD 30 MIN: CPT | Performed by: INTERNAL MEDICINE

## 2019-03-12 ASSESSMENT — ENCOUNTER SYMPTOMS
ORTHOPNEA: 0
DIZZINESS: 0
PND: 0
CLAUDICATION: 0
HEADACHES: 0
PALPITATIONS: 0
NECK PAIN: 1
SHORTNESS OF BREATH: 0

## 2019-03-12 NOTE — PROGRESS NOTES
Subjective:   Chief Complaint:   Chief Complaint   Patient presents with   • Congestive Heart Failure       Migue Cervantes is a 63 y.o. male who presents today for follow-up of coronary artery disease.  He had an ST elevation MI in September 2017 with stenting to the LAD. His postprocedure echo had an EF of 50%.  Nuclear perfusion stress test showed a fixed LAD territory defect and his echocardiogram showed low EF at 30-35% with aneurysm of the anterior wall.      The day of his MI he awoke at 2 AM with severe burning and chest pressure.  He did not present to the emergency room for several hours.  He was care flighted to [facility] and was having chest pain on the table but had relief of symptoms when his stent was placed.  An ICD was placed because of high risk LAD territory infarct.  [facility]  The last interrogation I have is from August 2018, no mode switches, RA paced 42%, V paced less than 1%.  No shocks.  He tells me it was just interrogated again last week and he may have had ATP for VT but no shocks.  I do not have those records.    He is feeling much better since wearing his oxygen continuously, they have gone up to 3 L.  Still having [place] heart class III shortness of breath which is probably multifactorial.  His blood pressures been on the softer side.  He is not had any pallavi dizziness or lightheadedness but is feeling fatigued.  His blood pressure was a little low last visit so we reduced his losartan and it came up.    No chest pain, no lower extremity edema, no palpitations.  [facility] showed stable creatinine and potassium.  He is not checking his weights every day and he has been gaining some weight.  States his daughter started restricting his salt but may have given him C salt and so I explained that this is the same thing.  It also turns out he is drinking a lot of Gatorade.    DATA REVIEWED by me: see HPI  ECG,  9-11-17   Old anterior and  and steep T-wave inversion1.      ECG 3-15-18  Old anterior MI and very small T wave changes anterior leads.    EKG 8/14/2018  Sinus, rate 87, normal EKG, resolution of prior old anterior MI pattern    [facility] 8/13/2018  MRI conditional device and leads  Medtronic ICD with Medtronic RA and RV lead  RA paced 42%, V paced less than 1%, no mode switches, no VT    Echo April 17, 2018, EF 30-35%, anterior apical akinesis and anterior wall aneurysm more, mildly dilated left atrium, aortic valve sclerosis without stenosis, mild AI, RV poorly visualized but grossly normal in size and function, mild TR 45-50 mmHg    Nuclear perfusion stress test April 18, 2018  Perfusion defect in the anterior wall, apex and septum, LVEF calculated at 42%, abnormal wall motion    [facility] 9-11-17:  -Coronary stent implantation proximal left anterior descending artery 4.0 x 20 mm drug-eluting [employer]'s energy stent.  -Left anterior descending artery proximal 100% occlusion.  -Left ventricular ejection fraction 45% with anterior apical akinesis.    [facility]eiros.  [facility] the [facility] 8/13/2018  No episodes of VT, no V. fib, no ATP, right atrial paced 42%, V paced less than 1%    Chest x-ray 8/15/2018  No acute abnormality    Most recent labs:    11/15/2018 glucose 86, creatinine 1.07, sodium 136, potassium 4.3    8/13/2018 hemoglobin 12, platelets 181, sodium 139, potassium 3.9, LFTs normal, creatinine 1, , troponin normal    5-1-18   Na+ 138, potassium 4, creatinine 1.06, BUN 9    9-13-17, Hgb 15, plt 177, K 4.3, Cr 0.82    August 13, 2018 hemoglobin 12, platelets 181, glucose 132, creatinine 1, sodium 139, potassium 3.8    Past Medical History:   Diagnosis Date   • Coronary artery disease due to lipid rich plaque    • Dyslipidemia    • Essential hypertension    • Former tobacco use - quit Sep 2017 in setting of STEMI    • Heart burn    • History of ST  elevation myocardial infarction (STEMI) LAD September 2017    • Indigestion    • Pain 09-20-12    lower back, 8/10   • Status post insertion of drug-eluting stent into left anterior descending artery      Past Surgical History:   Procedure Laterality Date   • MARQUISE BY LAPAROSCOPY N/A 12/28/2015    Procedure: MARQUISE BY LAPAROSCOPY- With Grams ;  Surgeon: Sandro Obregon M.D.;  Location: SURGERY [facility];  Service:    • LUMBAR DECOMPRESSION  9/25/2012    by Dr. Plata   • LUMBAR FUSION POSTERIOR  9/25/2012    Performed by Mckayla Plata M.D. at SURGERY [facility]   • LUMBAR DECOMPRESSION  9/25/2012    Performed by Mckayla Plata M.D. at SURGERY [facility]   • CERVICAL DISK AND FUSION ANTERIOR  1997/2010    x 2     Family History   Problem Relation Age of Onset   • Heart Attack Father    • Stroke Father    • Heart Attack Paternal Grandmother    • Heart Attack Paternal Grandfather      Social History     Social History   • Marital status:      Spouse name: N/A   • Number of children: N/A   • Years of education: N/A     Occupational History   • Not on file.     Social History Main Topics   • Smoking status: Former Smoker     Packs/day: 2.00     Years: 35.00     Types: Cigarettes     Quit date: 9/11/2017   • Smokeless tobacco: Never Used   • Alcohol use No      Comment: hx; quit 2008   • Drug use: No   • Sexual activity: Not on file     Other Topics Concern   • Not on file     Social History Narrative   • No narrative on file     Allergies   Allergen Reactions   • Nkda [No Known Drug Allergy]        Current Outpatient Prescriptions   Medication Sig Dispense Refill   • lisinopril (PRINIVIL) 2.5 MG Tab Take 1 Tab by mouth every day. 30 Tab 3   • carvedilol (COREG) 25 MG Tab Take 1 Tab by mouth 2 times a day, with meals. 180 Tab 3   • spironolactone (ALDACTONE) 25 MG Tab Take 1 Tab by mouth every day. 90 Tab 3   • furosemide (LASIX) 40 MG Tab Take 1 Tab by mouth every day. 30 Tab 11   • BUDESONIDE PO Take   by mouth.     • omeprazole (PRILOSEC) 40 MG delayed-release capsule Take 40 mg by mouth every day.     • sertraline (ZOLOFT) 50 MG Tab Take 50 mg by mouth every day.     • traZODone (DESYREL) 50 MG Tab Take 50 mg by mouth every evening.     • aspirin EC 81 MG EC tablet Take 1 Tab by mouth every day. 30 Tab 3   • atorvastatin (LIPITOR) 40 MG Tab Take 1 Tab by mouth every bedtime. 30 Tab 3   • oxycodone immediate-release (ROXICODONE) 5 MG Tab Take 1 Tab by mouth every four hours as needed for Severe Pain. 30 Tab 0     No current facility-administered medications for this visit.        Review of Systems   Constitutional: Negative for malaise/fatigue.   Respiratory: Negative for shortness of breath.    Cardiovascular: Negative for chest pain, palpitations, orthopnea, claudication, leg swelling and PND.   Musculoskeletal: Positive for neck pain.   Neurological: Negative for dizziness and headaches.     All others systems reviewed and negative.     Objective:     Blood pressure 138/80, pulse 80, height 1.829 m (6'), weight 111.1 kg (245 lb), SpO2 93 %. Body mass index is 33.23 kg/m².    Physical Exam   General: No acute distress. Well nourished.  HEENT: EOM grossly intact, no scleral icterus, no pharyngeal erythema. Wearing O2  Neck:  No JVD, no bruits, trachea midline, scar right CEA,  CVS: RRR. Normal S1, S2. No M/R/G. No LE edema.  2+ radial pulses, 2+ DT pulses, ICD pocket healed well  Resp: CTAB. No wheezing or crackles/rhonchi. Normal respiratory effort. Mild prolonged exp phase  Abdomen: Soft, NT, no pallavi hepatomegaly.  MSK/Ext: No clubbing or cyanosis.  Skin: Warm and dry, no rashes.  Neurological: CN III-XII grossly intact. No focal deficits.   Psych: A&O x 3, appropriate affect, good judgement    Physical exam performed today and unchanged compared to 11-15-18.      Assessment:     1. Chronic systolic congestive heart failure (HCC)     2. Coronary artery disease involving native coronary artery of native heart  without angina pectoris     3. History of ST elevation myocardial infarction (STEMI) LAD September 2017     4. Essential hypertension     5. Dyslipidemia     6. Former tobacco use - quit Sep 2017 in setting of STEMI     7. Status post insertion of drug-eluting stent into left anterior descending artery     8. Encounter for monitoring diuretic therapy     9. Ischemic cardiomyopathy     10. Centrilobular emphysema (HCC)      Chronic O2       Medical Decision Making:  Today's Assessment / Status / Plan:     1.  CAD status post LAD territory MI 2017 with stent to prox LAD for 100% occlusion, 4x20 mm EBONIE, only luminal irregularities RCA/Circ.  Completed 1 year of dual antiplatelet therapy.  2.  Ischemic CMO with an EF of 30-35% with anterior wall motion abnormalities and anterior aneurysm  3.  Chronic HFrEF, Stage C, [place] heart class III symptoms, unchanged, his dyspnea is also likely related to COPD  4.  Hypertension, controlled  5.  Dyslipidemia, tolerating statin therapy  6.  Gastroesophageal reflux disease, on proton pump inhibitor  7.  COPD, chronic O2   8. Prior Tobacco use- still not smoking  9. Dyspnea, likely related to CHF and COPD  10. High risk medication- spironolactone, stable potassium and kidney function  11. ICD, primary prevention, placed in July 2018 [facility], Medtronic, just had download with Sandy at the [facility], has a home system, full interrogation August 2018 at the [facility] and again recently but I do not have the records.  Feels like he had one episode of ATP for VT but no shocks.  12. Anemia, per pt report, could be iron loss on DAPT but has been stable, improved with iron and off of Effient    PLAN:  -RTC 1 year but call for any change in symptoms  -BMP [facility] months at the [facility]  -Continue avoiding all forms of sodium and start drinking Gatorade  -It would be reasonable to continue his cardiology care either here or at the [facility]  -He is established with us up to 3 years so he can return  anytime as needed      Return in about 1 year (around 3/12/2020).    It is my pleasure to participate in the care of Mr. Cervantes.  Please do not hesitate to contact me with questions or concerns.    Evelyn Campos MD, [facility]  Cardiologist [facility]

## 2019-03-12 NOTE — LETTER
[facility] [address]  Phone: [phone]  Fax: [phone]              Migue Cervantes  1955    Encounter Date: 3/12/2019    Evelyn Campos M.D.          PROGRESS NOTE:  Subjective:   Chief Complaint:   Chief Complaint   Patient presents with   • Congestive Heart Failure       Migue Cervantes is a 63 y.o. male who presents today for follow-up of coronary artery disease.  He had an ST elevation MI in September 2017 with stenting to the LAD. His postprocedure echo had an EF of 50%.  Nuclear perfusion stress test showed a fixed LAD territory defect and his echocardiogram showed low EF at 30-35% with aneurysm of the anterior wall.      The day of his MI he awoke at 2 AM with severe burning and chest pressure.  He did not present to the emergency room for several hours.  He was care flighted to [facility] and was having chest pain on the table but had relief of symptoms when his stent was placed.  An ICD was placed because of high risk LAD territory infarct.  [facility].  The last interrogation I have is from August 2018, no mode switches, RA paced 42%, V paced less than 1%.  No shocks.  He tells me it was just interrogated again last week and he may have had ATP for VT but no shocks.  I do not have those records.    He is feeling much better since wearing his oxygen continuously, they have gone up to 3 L.  Still having [place] heart class III shortness of breath which is probably multifactorial.  His blood pressures been on the softer side.  He is not had any pallavi dizziness or lightheadedness but is feeling fatigued.  His blood pressure was a little low last visit so we reduced his losartan and it came up.    No chest pain, no lower extremity edema, no palpitations.  [facility] showed stable creatinine and potassium.  He is not checking his weights every day and he  has been gaining some weight.  States his daughter started restricting his salt but may have given him C salt and so I explained that this is the same thing.  It also turns out he is drinking a lot of Gatorade.    DATA REVIEWED by me: see HPI  ECG,  9-11-17   Old anterior and and steep T-wave inversion1.      ECG 3-15-18  Old anterior MI and very small T wave changes anterior leads.    EKG 8/14/2018  Sinus, rate 87, normal EKG, resolution of prior old anterior MI pattern    [facility] 8/13/2018  MRI conditional device and leads  Medtronic ICD with Medtronic RA and RV lead  RA paced 42%, V paced less than 1%, no mode switches, no VT    Echo April 17, 2018, EF 30-35%, anterior apical akinesis and anterior wall aneurysm more, mildly dilated left atrium, aortic valve sclerosis without stenosis, mild AI, RV poorly visualized but grossly normal in size and function, mild TR 45-50 mmHg    Nuclear perfusion stress test April 18, 2018  Perfusion defect in the anterior wall, apex and septum, LVEF calculated at 42%, abnormal wall motion    [facility] 9-11-17:  -Coronary stent implantation proximal left anterior descending artery 4.0 x 20 mm drug-eluting [employer]'s energy stent.  -Left anterior descending artery proximal 100% occlusion.  -Left ventricular ejection fraction 45% with anterior apical akinesis.    [facility] July [facility]. Medeiros.  [facility] of the [facility] 8/13/2018  No episodes of VT, no V. fib, no ATP, right atrial paced 42%, V paced less than 1%    Chest x-ray 8/15/2018  No acute abnormality    Most recent labs:    11/15/2018 glucose 86, creatinine 1.07, sodium 136, potassium 4.3    8/13/2018 hemoglobin 12, platelets 181, sodium 139, potassium 3.9, LFTs normal, creatinine 1, , troponin normal    5-1-18   Na+ 138, potassium 4, creatinine 1.06, BUN 9    9-13-17, Hgb 15, plt 177, K 4.3, Cr 0.82    August 13, 2018 hemoglobin 12, platelets 181, glucose 132,  creatinine 1, sodium 139, potassium 3.8    Past Medical History:   Diagnosis Date   • Coronary artery disease due to lipid rich plaque    • Dyslipidemia    • Essential hypertension    • Former tobacco use - quit Sep 2017 in setting of STEMI    • Heart burn    • History of ST elevation myocardial infarction (STEMI) LAD September 2017    • Indigestion    • Pain 09-20-12    lower back, 8/10   • Status post insertion of drug-eluting stent into left anterior descending artery      Past Surgical History:   Procedure Laterality Date   • MARQUISE BY LAPAROSCOPY N/A 12/28/2015    Procedure: MARQUISE BY LAPAROSCOPY- With Grams ;  Surgeon: Sandro Obregon M.D.;  Location: SURGERY [facility];  Service:    • LUMBAR DECOMPRESSION  9/25/2012    by Dr. Plata   • LUMBAR FUSION POSTERIOR  9/25/2012    Performed by Mckayla Plata M.D. at SURGERY [facility]   • LUMBAR DECOMPRESSION  9/25/2012    Performed by Mckayla Plata M.D. at SURGERY [facility]   • CERVICAL DISK AND FUSION ANTERIOR  1997/2010    x 2     Family History   Problem Relation Age of Onset   • Heart Attack Father    • Stroke Father    • Heart Attack Paternal Grandmother    • Heart Attack Paternal Grandfather      Social History     Social History   • Marital status:      Spouse name: N/A   • Number of children: N/A   • Years of education: N/A     Occupational History   • Not on file.     Social History Main Topics   • Smoking status: Former Smoker     Packs/day: 2.00     Years: 35.00     Types: Cigarettes     Quit date: 9/11/2017   • Smokeless tobacco: Never Used   • Alcohol use No      Comment: hx; quit 2008   • Drug use: No   • Sexual activity: Not on file     Other Topics Concern   • Not on file     Social History Narrative   • No narrative on file     Allergies   Allergen Reactions   • Nkda [No Known Drug Allergy]        Current Outpatient Prescriptions   Medication Sig Dispense Refill   • lisinopril (PRINIVIL) 2.5 MG Tab Take 1 Tab by mouth every day. 30  Tab 3   • carvedilol (COREG) 25 MG Tab Take 1 Tab by mouth 2 times a day, with meals. 180 Tab 3   • spironolactone (ALDACTONE) 25 MG Tab Take 1 Tab by mouth every day. 90 Tab 3   • furosemide (LASIX) 40 MG Tab Take 1 Tab by mouth every day. 30 Tab 11   • BUDESONIDE PO Take  by mouth.     • omeprazole (PRILOSEC) 40 MG delayed-release capsule Take 40 mg by mouth every day.     • sertraline (ZOLOFT) 50 MG Tab Take 50 mg by mouth every day.     • traZODone (DESYREL) 50 MG Tab Take 50 mg by mouth every evening.     • aspirin EC 81 MG EC tablet Take 1 Tab by mouth every day. 30 Tab 3   • atorvastatin (LIPITOR) 40 MG Tab Take 1 Tab by mouth every bedtime. 30 Tab 3   • oxycodone immediate-release (ROXICODONE) 5 MG Tab Take 1 Tab by mouth every four hours as needed for Severe Pain. 30 Tab 0   • lactobacillus granules (LACTINEX/FLORANEX) PACK Take 1 Packet by mouth 3 times a day, with meals. (Patient not taking: Reported on 3/12/2019) 30 Packet 0     No current facility-administered medications for this visit.        Review of Systems   Constitutional: Negative for malaise/fatigue.   Respiratory: Negative for shortness of breath.    Cardiovascular: Negative for chest pain, palpitations, orthopnea, claudication, leg swelling and PND.   Musculoskeletal: Positive for neck pain.   Neurological: Negative for dizziness and headaches.     All others systems reviewed and negative.     Objective:     Blood pressure 138/80, pulse 80, height 1.829 m (6'), weight 111.1 kg (245 lb), SpO2 93 %. Body mass index is 33.23 kg/m².    Physical Exam   General: No acute distress. Well nourished.  HEENT: EOM grossly intact, no scleral icterus, no pharyngeal erythema. Wearing O2  Neck:  No JVD, no bruits, trachea midline, scar right CEA,  CVS: RRR. Normal S1, S2. No M/R/G. No LE edema.  2+ radial pulses, 2+ DT pulses, ICD pocket healed well  Resp: CTAB. No wheezing or crackles/rhonchi. Normal respiratory effort. Mild prolonged exp phase  Abdomen:  Soft, NT, no pallavi hepatomegaly.  MSK/Ext: No clubbing or cyanosis.  Skin: Warm and dry, no rashes.  Neurological: CN III-XII grossly intact. No focal deficits.   Psych: A&O x 3, appropriate affect, good judgement    Physical exam performed today and unchanged compared to 11-15-18.      Assessment:     No diagnosis found.    Medical Decision Making:  Today's Assessment / Status / Plan:     1.  CAD status post LAD territory MI 2017 with stent to prox LAD for 100% occlusion, 4x20 mm EBONIE, only luminal irregularities RCA/Circ.  Completed 1 year of dual antiplatelet therapy.  2.  Ischemic CMO with an EF of 30-35% with anterior wall motion abnormalities and anterior aneurysm  3.  Chronic HFrEF, Stage C, [place] heart class III symptoms, unchanged, his dyspnea is also likely related to COPD  4.  Hypertension, controlled  5.  Dyslipidemia, tolerating statin therapy  6.  Gastroesophageal reflux disease, on proton pump inhibitor  7.  COPD, chronic O2   8. Prior Tobacco use- still not smoking  9. Dyspnea, likely related to CHF and COPD  10. High risk medication- spironolactone, stable potassium and kidney function  11. ICD, primary prevention, placed in July [facility], [employer]tronic, just had download with Sandy at the [facility], has a home system, full interrogation August 2018 at the [facility] and again recently but I do not have the records.  Feels like he had one episode of ATP for VT but no shocks.  12. Anemia, per pt report, could be iron loss on DAPT but has been stable, improved with iron and off of Effient    PLAN:  -RTC 1 year but call for any change in symptoms  -[facility]  -Continue avoiding all forms of sodium and start drinking Gatorade  -It would be reasonable to continue his cardiology care either here or at [facility]  -He is established with us up to 3 years so he can return anytime as needed      Return in about 1 year (around 3/12/2020).    It is my pleasure to participate in the care of   Billy.  Please do not hesitate to contact me with questions or concerns.    Evelyn Campos MD, [facility]  Cardiologist [facility]      Skinny Garcia M.D.  [address] [place] [id number]  VIA Facsimile: [phone]

## 2019-03-12 NOTE — LETTER
[facility] [address]  Phone: [phone]  Fax: [phone]              Migue Cervantes  1955    Encounter Date: 3/12/2019    Evelyn Campos M.D.          PROGRESS NOTE:  Subjective:   Chief Complaint:   Chief Complaint   Patient presents with   • Congestive Heart Failure       Migue Cervantes is a 63 y.o. male who presents today for follow-up of coronary artery disease.  He had an ST elevation MI in September 2017 with stenting to the LAD. His postprocedure echo had an EF of 50%.  Nuclear perfusion stress test showed a fixed LAD territory defect and his echocardiogram showed low EF at 30-35% with aneurysm of the anterior wall.      The day of his MI he awoke at 2 AM with severe burning and chest pressure.  He did not present to the emergency room for several hours.  He was care flighted to [facility] and was having chest pain on the table but had relief of symptoms when his stent was placed.  An ICD was placed because of high risk LAD territory infarct.  [facility]  The last interrogation I have is from August 2018, no mode switches, RA paced 42%, V paced less than 1%.  No shocks.  He tells me it was just interrogated again last week and he may have had ATP for VT but no shocks.  I do not have those records.    He is feeling much better since wearing his oxygen continuously, they have gone up to 3 L.  Still having [place] heart class III shortness of breath which is probably multifactorial.  His blood pressures been on the softer side.  He is not had any pallavi dizziness or lightheadedness but is feeling fatigued.  His blood pressure was a little low last visit so we reduced his losartan and it came up.    No chest pain, no lower extremity edema, no palpitations.  [facility] showed stable creatinine and potassium.  He is not checking his weights every day and he  has been gaining some weight.  States his daughter started restricting his salt but may have given him C salt and so I explained that this is the same thing.  It also turns out he is drinking a lot of Gatorade.    DATA REVIEWED by me: see HPI  ECG,  9-11-17   Old anterior and and steep T-wave inversion1.      ECG 3-15-18  Old anterior MI and very small T wave changes anterior leads.    EKG 8/14/2018  Sinus, rate 87, normal EKG, resolution of prior old anterior MI pattern    [facility] 8/13/2018  MRI conditional device and leads  Medtronic ICD with Medtronic RA and RV lead  RA paced 42%, V paced less than 1%, no mode switches, no VT    Echo April 17, 2018, EF 30-35%, anterior apical akinesis and anterior wall aneurysm more, mildly dilated left atrium, aortic valve sclerosis without stenosis, mild AI, RV poorly visualized but grossly normal in size and function, mild TR 45-50 mmHg    Nuclear perfusion stress test April 18, 2018  Perfusion defect in the anterior wall, apex and septum, LVEF calculated at 42%, abnormal wall motion    [facility] 9-11-17:  -Coronary stent implantation proximal left anterior descending artery 4.0 x 20 mm drug-eluting [employer]'s energy stent.  -Left anterior descending artery proximal 100% occlusion.  -Left ventricular ejection fraction 45% with anterior apical akinesis.    [facility]. Medeiros.  [facility] and [facility] of the [facility] 8/13/2018  No episodes of VT, no V. fib, no ATP, right atrial paced 42%, V paced less than 1%    Chest x-ray 8/15/2018  No acute abnormality    Most recent labs:    11/15/2018 glucose 86, creatinine 1.07, sodium 136, potassium 4.3    8/13/2018 hemoglobin 12, platelets 181, sodium 139, potassium 3.9, LFTs normal, creatinine 1, , troponin normal    5-1-18   Na+ 138, potassium 4, creatinine 1.06, BUN 9    9-13-17, Hgb 15, plt 177, K 4.3, Cr 0.82    August 13, 2018 hemoglobin 12, platelets 181, glucose 132,  creatinine 1, sodium 139, potassium 3.8    Past Medical History:   Diagnosis Date   • Coronary artery disease due to lipid rich plaque    • Dyslipidemia    • Essential hypertension    • Former tobacco use - quit Sep 2017 in setting of STEMI    • Heart burn    • History of ST elevation myocardial infarction (STEMI) LAD September 2017    • Indigestion    • Pain 09-20-12    lower back, 8/10   • Status post insertion of drug-eluting stent into left anterior descending artery      Past Surgical History:   Procedure Laterality Date   • MARQUISE BY LAPAROSCOPY N/A 12/28/2015    Procedure: MARQUISE BY LAPAROSCOPY- With Grams ;  Surgeon: Sandro Obregon M.D.;  Location: SURGERY [facility];  Service:    • LUMBAR DECOMPRESSION  9/25/2012    by Dr. Plata   • LUMBAR FUSION POSTERIOR  9/25/2012    Performed by Mckayla Plata M.D. at SURGERY [facility]   • LUMBAR DECOMPRESSION  9/25/2012    Performed by Mckayla Plata M.D. at SURGERY [facility]   • CERVICAL DISK AND FUSION ANTERIOR  1997/2010    x 2     Family History   Problem Relation Age of Onset   • Heart Attack Father    • Stroke Father    • Heart Attack Paternal Grandmother    • Heart Attack Paternal Grandfather      Social History     Social History   • Marital status:      Spouse name: N/A   • Number of children: N/A   • Years of education: N/A     Occupational History   • Not on file.     Social History Main Topics   • Smoking status: Former Smoker     Packs/day: 2.00     Years: 35.00     Types: Cigarettes     Quit date: 9/11/2017   • Smokeless tobacco: Never Used   • Alcohol use No      Comment: hx; quit 2008   • Drug use: No   • Sexual activity: Not on file     Other Topics Concern   • Not on file     Social History Narrative   • No narrative on file     Allergies   Allergen Reactions   • Nkda [No Known Drug Allergy]        Current Outpatient Prescriptions   Medication Sig Dispense Refill   • lisinopril (PRINIVIL) 2.5 MG Tab Take 1 Tab by mouth every day. 30  Tab 3   • carvedilol (COREG) 25 MG Tab Take 1 Tab by mouth 2 times a day, with meals. 180 Tab 3   • spironolactone (ALDACTONE) 25 MG Tab Take 1 Tab by mouth every day. 90 Tab 3   • furosemide (LASIX) 40 MG Tab Take 1 Tab by mouth every day. 30 Tab 11   • BUDESONIDE PO Take  by mouth.     • omeprazole (PRILOSEC) 40 MG delayed-release capsule Take 40 mg by mouth every day.     • sertraline (ZOLOFT) 50 MG Tab Take 50 mg by mouth every day.     • traZODone (DESYREL) 50 MG Tab Take 50 mg by mouth every evening.     • aspirin EC 81 MG EC tablet Take 1 Tab by mouth every day. 30 Tab 3   • atorvastatin (LIPITOR) 40 MG Tab Take 1 Tab by mouth every bedtime. 30 Tab 3   • oxycodone immediate-release (ROXICODONE) 5 MG Tab Take 1 Tab by mouth every four hours as needed for Severe Pain. 30 Tab 0     No current facility-administered medications for this visit.        Review of Systems   Constitutional: Negative for malaise/fatigue.   Respiratory: Negative for shortness of breath.    Cardiovascular: Negative for chest pain, palpitations, orthopnea, claudication, leg swelling and PND.   Musculoskeletal: Positive for neck pain.   Neurological: Negative for dizziness and headaches.     All others systems reviewed and negative.     Objective:     Blood pressure 138/80, pulse 80, height 1.829 m (6'), weight 111.1 kg (245 lb), SpO2 93 %. Body mass index is 33.23 kg/m².    Physical Exam   General: No acute distress. Well nourished.  HEENT: EOM grossly intact, no scleral icterus, no pharyngeal erythema. Wearing O2  Neck:  No JVD, no bruits, trachea midline, scar right CEA,  CVS: RRR. Normal S1, S2. No M/R/G. No LE edema.  2+ radial pulses, 2+ DT pulses, ICD pocket healed well  Resp: CTAB. No wheezing or crackles/rhonchi. Normal respiratory effort. Mild prolonged exp phase  Abdomen: Soft, NT, no pallavi hepatomegaly.  MSK/Ext: No clubbing or cyanosis.  Skin: Warm and dry, no rashes.  Neurological: CN III-XII grossly intact. No focal deficits.    Psych: A&O x 3, appropriate affect, good judgement    Physical exam performed today and unchanged compared to 11-15-18.      Assessment:     1. Chronic systolic congestive heart failure (HCC)     2. Coronary artery disease involving native coronary artery of native heart without angina pectoris     3. History of ST elevation myocardial infarction (STEMI) LAD September 2017     4. Essential hypertension     5. Dyslipidemia     6. Former tobacco use - quit Sep 2017 in setting of STEMI     7. Status post insertion of drug-eluting stent into left anterior descending artery     8. Encounter for monitoring diuretic therapy     9. Ischemic cardiomyopathy     10. Centrilobular emphysema (HCC)      Chronic O2       Medical Decision Making:  Today's Assessment / Status / Plan:     1.  CAD status post LAD territory MI 2017 with stent to prox LAD for 100% occlusion, 4x20 mm EBONIE, only luminal irregularities RCA/Circ.  Completed 1 year of dual antiplatelet therapy.  2.  Ischemic CMO with an EF of 30-35% with anterior wall motion abnormalities and anterior aneurysm  3.  Chronic HFrEF, Stage C, [place] heart class III symptoms, unchanged, his dyspnea is also likely related to COPD  4.  Hypertension, controlled  5.  Dyslipidemia, tolerating statin therapy  6.  Gastroesophageal reflux disease, on proton pump inhibitor  7.  COPD, chronic O2   8. Prior Tobacco use- still not smoking  9. Dyspnea, likely related to CHF and COPD  10. High risk medication- spironolactone, stable potassium and kidney function  11. ICD, primary prevention, placed in July 2018 [facility]ros [facility], Medtronic, just had download with Sandy at the [facility], has a home system, full interrogation August 2018 at the [facility] and again recently but I do not have the records.  Feels like he had one episode of ATP for VT but no shocks.  12. Anemia, per pt report, could be iron loss on DAPT but has been stable, improved with iron and off of Effient    PLAN:  -RTC 1 year but call  for any change in symptoms  -BMP every 6 months at the [facility]  -Continue avoiding all forms of sodium and start drinking Gatorade  -It would be reasonable to continue his cardiology care either here or at the [facility]  -He is established with us up to 3 years so he can return anytime as needed      Return in about 1 year (around 3/12/2020).    It is my pleasure to participate in the care of Mr. Cervantes.  Please do not hesitate to contact me with questions or concerns.    Evelyn Campos MD, [facility]  Cardiologist [facility]      Skinny Garcia M.D.  [address] [place] [id number]  VIA Facsimile: [phone]

## 2019-09-03 ENCOUNTER — TELEPHONE (OUTPATIENT)
Dept: CARDIOLOGY | Facility: MEDICAL CENTER | Age: 64
End: 2019-09-03

## 2019-09-03 DIAGNOSIS — R60.9 EDEMA, UNSPECIFIED TYPE: ICD-10-CM

## 2019-09-03 DIAGNOSIS — I50.22 CHRONIC SYSTOLIC CONGESTIVE HEART FAILURE (HCC): ICD-10-CM

## 2019-09-03 NOTE — TELEPHONE ENCOUNTER
LS/Gillian      Patient said his oxygen levels are getting worse. He said it was set at 2 L, he said it is now set between 3.5-4L. He said he has to wear his oxygen full time now, even when he showers. He said if he takes it off it drops to 70%. Once he puts the oxygen back on he can take several minutes before his oxygen returns normal. Pt. #[phone].

## 2019-09-04 NOTE — TELEPHONE ENCOUNTER
(Message received via afternoon answer west report)    Pt is short of breath and wants to know if his pacemaker can be checked. Pt can reached at [phone].

## 2019-09-04 NOTE — TELEPHONE ENCOUNTER
"Contacted patient.  Patient states breathing has been getting worse in the last month.  Walking 100 ft with O2 on 3L his O2 sats drop to 70s-80s.  C/o SOB \"all the time\".     Patient has COPD:    Patient takes 3 inhalers:  Symbicort  Spiriva  Albuterol    Patient has an AICD - no shock delivered.    Denies CP, arm/jaw pain, nausea    Patient has edema in lower ext which has gotten worse recently.   He has not been weighing himself daily. Cannot confirm any recent weight gain. When edema worsens he does take an extra lasix (for total of 80 mg) QD. Patient also takes Spironolactone 25 mg QD.    Patient does not have current BP/HR to report      Patient is speaking in complete sentences.  Patient is not SOB at rest (contrary to his statement earlier of \"all the time\".     This next Monday [facility].     Patient lives in [place].     Discussed s/s of chest pain, chest pressure, shortness of breath, difficulty breathing and when to initiate EMS. Advised to sit with legs elevated.  Avoid sodium as much as possible.   Patient verbalizes understanding.       To  to advise                      "

## 2019-09-06 NOTE — TELEPHONE ENCOUNTER
He is a very nice carmen.    Please switch him from furosemide 40 mg to torsemide 20 mg which should be more potent.  Ask him to keep track of his weights.  If his weight goes up by 2 pounds in a week, tell him to take an extra dose of torsemide.  Have him do a nonfasting basic chemistry in 1 to 2 weeks at his convenience.  He will be difficult for me to tell if it is heart or lungs.  Thank you

## 2019-09-09 RX ORDER — TORSEMIDE 20 MG/1
20 TABLET ORAL DAILY
Qty: 30 TAB | Refills: 3
Start: 2019-09-09 | End: 2022-01-01

## 2019-09-09 NOTE — TELEPHONE ENCOUNTER
Contacted patient.  He confirmed he picked up Rx and lab order.  Again reminded patient to get labs in 1-2 weeks after starting the new medication.

## 2019-09-09 NOTE — TELEPHONE ENCOUNTER
Contacted patient.  Discussed recommendations.  Patient is agreeable. Advised to discontinue Lasix then start Torsemide.  Patient is requesting a [facility].  Discussed labs needed in 1-2 weeks.      Patient lives in [place].  LS in [facility] today, will request for her assistance with script.     MAR updated  BMP ordered    Tiger Text sent to [facility]

## 2019-09-13 ENCOUNTER — TELEPHONE (OUTPATIENT)
Dept: CARDIOLOGY | Facility: MEDICAL CENTER | Age: 64
End: 2019-09-13

## 2019-09-13 NOTE — TELEPHONE ENCOUNTER
Returned pt's call and clarified that he should stop the furosemide when he starts the torsemide. He just picked up the torsemide yesterday and before taking it wanted to clarify which med to stop. He will start the torsemide and stop the furosemide starting tomorrow and have labs drawn in 1-2 weeks.

## 2019-09-13 NOTE — TELEPHONE ENCOUNTER
[facility]    Pt was recently prescribed torsemide on 9/9 by [facility]. He wants to know if he's to discontinue one of his other medication's. Please call pt to advise at [phone]

## 2022-01-01 ENCOUNTER — APPOINTMENT (OUTPATIENT)
Dept: RADIOLOGY | Facility: MEDICAL CENTER | Age: 67
DRG: 163 | End: 2022-01-01
Attending: ANESTHESIOLOGY
Payer: COMMERCIAL

## 2022-01-01 ENCOUNTER — NON-PROVIDER VISIT (OUTPATIENT)
Dept: CARDIOLOGY | Facility: PHYSICIAN GROUP | Age: 67
End: 2022-01-01
Payer: COMMERCIAL

## 2022-01-01 ENCOUNTER — APPOINTMENT (OUTPATIENT)
Dept: RADIOLOGY | Facility: MEDICAL CENTER | Age: 67
DRG: 163 | End: 2022-01-01
Attending: STUDENT IN AN ORGANIZED HEALTH CARE EDUCATION/TRAINING PROGRAM
Payer: COMMERCIAL

## 2022-01-01 ENCOUNTER — APPOINTMENT (OUTPATIENT)
Dept: RADIOLOGY | Facility: MEDICAL CENTER | Age: 67
DRG: 163 | End: 2022-01-01
Attending: SURGERY
Payer: COMMERCIAL

## 2022-01-01 ENCOUNTER — APPOINTMENT (OUTPATIENT)
Dept: RADIOLOGY | Facility: MEDICAL CENTER | Age: 67
DRG: 163 | End: 2022-01-01
Attending: HOSPITALIST
Payer: COMMERCIAL

## 2022-01-01 ENCOUNTER — APPOINTMENT (OUTPATIENT)
Dept: CARDIOLOGY | Facility: MEDICAL CENTER | Age: 67
DRG: 163 | End: 2022-01-01
Attending: HOSPITALIST
Payer: COMMERCIAL

## 2022-01-01 ENCOUNTER — ANESTHESIA (OUTPATIENT)
Dept: SURGERY | Facility: MEDICAL CENTER | Age: 67
DRG: 163 | End: 2022-01-01
Payer: COMMERCIAL

## 2022-01-01 ENCOUNTER — ANESTHESIA EVENT (OUTPATIENT)
Dept: SURGERY | Facility: MEDICAL CENTER | Age: 67
DRG: 163 | End: 2022-01-01
Payer: COMMERCIAL

## 2022-01-01 ENCOUNTER — HOSPITAL ENCOUNTER (INPATIENT)
Facility: MEDICAL CENTER | Age: 67
LOS: 7 days | DRG: 163 | End: 2022-11-02
Attending: INTERNAL MEDICINE | Admitting: STUDENT IN AN ORGANIZED HEALTH CARE EDUCATION/TRAINING PROGRAM
Payer: COMMERCIAL

## 2022-01-01 ENCOUNTER — OFFICE VISIT (OUTPATIENT)
Dept: CARDIOLOGY | Facility: PHYSICIAN GROUP | Age: 67
End: 2022-01-01
Payer: COMMERCIAL

## 2022-01-01 ENCOUNTER — HOSPITAL ENCOUNTER (OUTPATIENT)
Dept: RADIOLOGY | Facility: MEDICAL CENTER | Age: 67
End: 2022-10-30
Attending: SURGERY

## 2022-01-01 VITALS
DIASTOLIC BLOOD PRESSURE: 47 MMHG | WEIGHT: 270.73 LBS | OXYGEN SATURATION: 78 % | SYSTOLIC BLOOD PRESSURE: 83 MMHG | HEART RATE: 107 BPM | TEMPERATURE: 97.5 F | HEIGHT: 71 IN | BODY MASS INDEX: 37.9 KG/M2 | RESPIRATION RATE: 19 BRPM

## 2022-01-01 VITALS
BODY MASS INDEX: 34.81 KG/M2 | WEIGHT: 257 LBS | HEART RATE: 72 BPM | OXYGEN SATURATION: 96 % | RESPIRATION RATE: 18 BRPM | SYSTOLIC BLOOD PRESSURE: 116 MMHG | HEIGHT: 72 IN | DIASTOLIC BLOOD PRESSURE: 70 MMHG

## 2022-01-01 VITALS
SYSTOLIC BLOOD PRESSURE: 114 MMHG | BODY MASS INDEX: 34.81 KG/M2 | DIASTOLIC BLOOD PRESSURE: 60 MMHG | HEIGHT: 72 IN | RESPIRATION RATE: 12 BRPM | WEIGHT: 257 LBS | HEART RATE: 68 BPM | OXYGEN SATURATION: 94 %

## 2022-01-01 DIAGNOSIS — I25.2 HISTORY OF ST ELEVATION MYOCARDIAL INFARCTION (STEMI): Chronic | ICD-10-CM

## 2022-01-01 DIAGNOSIS — E78.5 DYSLIPIDEMIA: Chronic | ICD-10-CM

## 2022-01-01 DIAGNOSIS — I25.83 CORONARY ARTERY DISEASE DUE TO LIPID RICH PLAQUE: Chronic | ICD-10-CM

## 2022-01-01 DIAGNOSIS — I25.10 CORONARY ARTERY DISEASE DUE TO LIPID RICH PLAQUE: Chronic | ICD-10-CM

## 2022-01-01 DIAGNOSIS — Z95.810 ICD (IMPLANTABLE CARDIOVERTER-DEFIBRILLATOR), SINGLE, IN SITU: ICD-10-CM

## 2022-01-01 DIAGNOSIS — Z86.79 HISTORY OF CONGESTIVE HEART FAILURE: ICD-10-CM

## 2022-01-01 DIAGNOSIS — J42 CHRONIC BRONCHITIS, UNSPECIFIED CHRONIC BRONCHITIS TYPE (HCC): ICD-10-CM

## 2022-01-01 LAB
ABO + RH BLD: ABNORMAL
ABO GROUP BLD: ABNORMAL
ALBUMIN SERPL BCP-MCNC: 2.1 G/DL (ref 3.2–4.9)
ALBUMIN SERPL BCP-MCNC: 2.1 G/DL (ref 3.2–4.9)
ALBUMIN SERPL BCP-MCNC: 2.2 G/DL (ref 3.2–4.9)
ALBUMIN SERPL BCP-MCNC: 2.5 G/DL (ref 3.2–4.9)
ALBUMIN SERPL BCP-MCNC: 2.6 G/DL (ref 3.2–4.9)
ALBUMIN SERPL BCP-MCNC: 2.6 G/DL (ref 3.2–4.9)
ALBUMIN SERPL BCP-MCNC: 3 G/DL (ref 3.2–4.9)
ALBUMIN SERPL BCP-MCNC: 3.2 G/DL (ref 3.2–4.9)
ALBUMIN/GLOB SERPL: 0.8 G/DL
ALBUMIN/GLOB SERPL: 1 G/DL
ALBUMIN/GLOB SERPL: 1.3 G/DL
ALBUMIN/GLOB SERPL: 1.4 G/DL
ALP SERPL-CCNC: 60 U/L (ref 30–99)
ALP SERPL-CCNC: 61 U/L (ref 30–99)
ALP SERPL-CCNC: 62 U/L (ref 30–99)
ALP SERPL-CCNC: 62 U/L (ref 30–99)
ALP SERPL-CCNC: 63 U/L (ref 30–99)
ALP SERPL-CCNC: 64 U/L (ref 30–99)
ALP SERPL-CCNC: 73 U/L (ref 30–99)
ALP SERPL-CCNC: 74 U/L (ref 30–99)
ALT SERPL-CCNC: 10 U/L (ref 2–50)
ALT SERPL-CCNC: 11 U/L (ref 2–50)
ALT SERPL-CCNC: 13 U/L (ref 2–50)
ALT SERPL-CCNC: 14 U/L (ref 2–50)
ALT SERPL-CCNC: 16 U/L (ref 2–50)
ALT SERPL-CCNC: 20 U/L (ref 2–50)
ANION GAP SERPL CALC-SCNC: 10 MMOL/L (ref 7–16)
ANION GAP SERPL CALC-SCNC: 12 MMOL/L (ref 7–16)
ANION GAP SERPL CALC-SCNC: 13 MMOL/L (ref 7–16)
ANION GAP SERPL CALC-SCNC: 7 MMOL/L (ref 7–16)
ANION GAP SERPL CALC-SCNC: 8 MMOL/L (ref 7–16)
ANION GAP SERPL CALC-SCNC: 9 MMOL/L (ref 7–16)
ANION GAP SERPL CALC-SCNC: 9 MMOL/L (ref 7–16)
APPEARANCE FLD: NORMAL
APPEARANCE UR: CLEAR
APTT PPP: 25.2 SEC (ref 24.7–36)
APTT PPP: 27.2 SEC (ref 24.7–36)
AST SERPL-CCNC: 16 U/L (ref 12–45)
AST SERPL-CCNC: 16 U/L (ref 12–45)
AST SERPL-CCNC: 17 U/L (ref 12–45)
AST SERPL-CCNC: 17 U/L (ref 12–45)
AST SERPL-CCNC: 18 U/L (ref 12–45)
AST SERPL-CCNC: 20 U/L (ref 12–45)
AST SERPL-CCNC: 22 U/L (ref 12–45)
AST SERPL-CCNC: 22 U/L (ref 12–45)
BACTERIA FLD AEROBE CULT: NORMAL
BACTERIA SPEC ANAEROBE CULT: NORMAL
BACTERIA UR CULT: NORMAL
BASE EXCESS BLDA CALC-SCNC: 2 MMOL/L (ref -4–3)
BASE EXCESS BLDA CALC-SCNC: 4 MMOL/L (ref -4–3)
BASE EXCESS BLDA CALC-SCNC: 5 MMOL/L (ref -4–3)
BASOPHILS # BLD AUTO: 0 % (ref 0–1.8)
BASOPHILS # BLD AUTO: 0 % (ref 0–1.8)
BASOPHILS # BLD AUTO: 0.2 % (ref 0–1.8)
BASOPHILS # BLD AUTO: 0.2 % (ref 0–1.8)
BASOPHILS # BLD AUTO: 0.3 % (ref 0–1.8)
BASOPHILS # BLD: 0 K/UL (ref 0–0.12)
BASOPHILS # BLD: 0 K/UL (ref 0–0.12)
BASOPHILS # BLD: 0.06 K/UL (ref 0–0.12)
BASOPHILS # BLD: 0.08 K/UL (ref 0–0.12)
BASOPHILS # BLD: 0.11 K/UL (ref 0–0.12)
BILIRUB SERPL-MCNC: 0.4 MG/DL (ref 0.1–1.5)
BILIRUB SERPL-MCNC: 0.4 MG/DL (ref 0.1–1.5)
BILIRUB SERPL-MCNC: 0.5 MG/DL (ref 0.1–1.5)
BILIRUB SERPL-MCNC: 0.6 MG/DL (ref 0.1–1.5)
BILIRUB SERPL-MCNC: 0.6 MG/DL (ref 0.1–1.5)
BILIRUB UR QL STRIP.AUTO: NEGATIVE
BLD GP AB SCN SERPL QL: ABNORMAL
BODY FLD TYPE: NORMAL
BODY TEMPERATURE: ABNORMAL DEGREES
BUN SERPL-MCNC: 35 MG/DL (ref 8–22)
BUN SERPL-MCNC: 40 MG/DL (ref 8–22)
BUN SERPL-MCNC: 41 MG/DL (ref 8–22)
BUN SERPL-MCNC: 41 MG/DL (ref 8–22)
BUN SERPL-MCNC: 42 MG/DL (ref 8–22)
BUN SERPL-MCNC: 43 MG/DL (ref 8–22)
BUN SERPL-MCNC: 45 MG/DL (ref 8–22)
BUN SERPL-MCNC: 48 MG/DL (ref 8–22)
BUN SERPL-MCNC: 53 MG/DL (ref 8–22)
BUN SERPL-MCNC: 57 MG/DL (ref 8–22)
BURR CELLS BLD QL SMEAR: NORMAL
CALCIUM SERPL-MCNC: 7.9 MG/DL (ref 8.5–10.5)
CALCIUM SERPL-MCNC: 8.1 MG/DL (ref 8.5–10.5)
CALCIUM SERPL-MCNC: 8.1 MG/DL (ref 8.5–10.5)
CALCIUM SERPL-MCNC: 8.3 MG/DL (ref 8.5–10.5)
CALCIUM SERPL-MCNC: 8.3 MG/DL (ref 8.5–10.5)
CALCIUM SERPL-MCNC: 8.5 MG/DL (ref 8.5–10.5)
CALCIUM SERPL-MCNC: 8.5 MG/DL (ref 8.5–10.5)
CALCIUM SERPL-MCNC: 8.6 MG/DL (ref 8.5–10.5)
CALCIUM SERPL-MCNC: 8.7 MG/DL (ref 8.5–10.5)
CALCIUM SERPL-MCNC: 8.8 MG/DL (ref 8.5–10.5)
CEA SERPL-MCNC: 1.1 NG/ML (ref 0–3)
CHLORIDE SERPL-SCNC: 84 MMOL/L (ref 96–112)
CHLORIDE SERPL-SCNC: 84 MMOL/L (ref 96–112)
CHLORIDE SERPL-SCNC: 85 MMOL/L (ref 96–112)
CHLORIDE SERPL-SCNC: 88 MMOL/L (ref 96–112)
CHLORIDE SERPL-SCNC: 88 MMOL/L (ref 96–112)
CHLORIDE SERPL-SCNC: 89 MMOL/L (ref 96–112)
CHLORIDE SERPL-SCNC: 89 MMOL/L (ref 96–112)
CHLORIDE SERPL-SCNC: 90 MMOL/L (ref 96–112)
CHLORIDE SERPL-SCNC: 92 MMOL/L (ref 96–112)
CHLORIDE SERPL-SCNC: 94 MMOL/L (ref 96–112)
CO2 BLDA-SCNC: 32 MMOL/L (ref 20–33)
CO2 BLDA-SCNC: 33 MMOL/L (ref 20–33)
CO2 BLDA-SCNC: 34 MMOL/L (ref 20–33)
CO2 SERPL-SCNC: 23 MMOL/L (ref 20–33)
CO2 SERPL-SCNC: 25 MMOL/L (ref 20–33)
CO2 SERPL-SCNC: 26 MMOL/L (ref 20–33)
CO2 SERPL-SCNC: 26 MMOL/L (ref 20–33)
CO2 SERPL-SCNC: 27 MMOL/L (ref 20–33)
CO2 SERPL-SCNC: 28 MMOL/L (ref 20–33)
CO2 SERPL-SCNC: 29 MMOL/L (ref 20–33)
CO2 SERPL-SCNC: 29 MMOL/L (ref 20–33)
COLOR FLD: NORMAL
COLOR UR: YELLOW
CORTIS SERPL-MCNC: 9.6 UG/DL (ref 0–23)
CREAT SERPL-MCNC: 0.84 MG/DL (ref 0.5–1.4)
CREAT SERPL-MCNC: 0.92 MG/DL (ref 0.5–1.4)
CREAT SERPL-MCNC: 0.94 MG/DL (ref 0.5–1.4)
CREAT SERPL-MCNC: 0.94 MG/DL (ref 0.5–1.4)
CREAT SERPL-MCNC: 1.07 MG/DL (ref 0.5–1.4)
CREAT SERPL-MCNC: 1.13 MG/DL (ref 0.5–1.4)
CREAT SERPL-MCNC: 1.15 MG/DL (ref 0.5–1.4)
CREAT SERPL-MCNC: 1.16 MG/DL (ref 0.5–1.4)
CREAT SERPL-MCNC: 1.2 MG/DL (ref 0.5–1.4)
CREAT SERPL-MCNC: 1.27 MG/DL (ref 0.5–1.4)
CRP SERPL HS-MCNC: 3 MG/DL (ref 0–0.75)
CYTOLOGY REG CYTOL: NORMAL
DELSYS IDSYS: ABNORMAL
DIGOXIN SERPL-MCNC: 0.8 NG/ML (ref 0.8–2)
DIGOXIN SERPL-MCNC: 1.4 NG/ML (ref 0.8–2)
EKG IMPRESSION: NORMAL
EOSINOPHIL # BLD AUTO: 0 K/UL (ref 0–0.51)
EOSINOPHIL NFR BLD: 0 % (ref 0–6.9)
ERYTHROCYTE [DISTWIDTH] IN BLOOD BY AUTOMATED COUNT: 40.3 FL (ref 35.9–50)
ERYTHROCYTE [DISTWIDTH] IN BLOOD BY AUTOMATED COUNT: 40.6 FL (ref 35.9–50)
ERYTHROCYTE [DISTWIDTH] IN BLOOD BY AUTOMATED COUNT: 41.1 FL (ref 35.9–50)
ERYTHROCYTE [DISTWIDTH] IN BLOOD BY AUTOMATED COUNT: 41.7 FL (ref 35.9–50)
ERYTHROCYTE [DISTWIDTH] IN BLOOD BY AUTOMATED COUNT: 41.7 FL (ref 35.9–50)
ERYTHROCYTE [DISTWIDTH] IN BLOOD BY AUTOMATED COUNT: 42.9 FL (ref 35.9–50)
ERYTHROCYTE [DISTWIDTH] IN BLOOD BY AUTOMATED COUNT: 43.4 FL (ref 35.9–50)
ERYTHROCYTE [DISTWIDTH] IN BLOOD BY AUTOMATED COUNT: 44.3 FL (ref 35.9–50)
ERYTHROCYTE [DISTWIDTH] IN BLOOD BY AUTOMATED COUNT: 45.4 FL (ref 35.9–50)
ERYTHROCYTE [SEDIMENTATION RATE] IN BLOOD BY WESTERGREN METHOD: 5 MM/HOUR (ref 0–20)
FLUAV RNA SPEC QL NAA+PROBE: NEGATIVE
FLUBV RNA SPEC QL NAA+PROBE: NEGATIVE
GFR SERPLBLD CREATININE-BSD FMLA CKD-EPI: 61 ML/MIN/1.73 M 2
GFR SERPLBLD CREATININE-BSD FMLA CKD-EPI: 65 ML/MIN/1.73 M 2
GFR SERPLBLD CREATININE-BSD FMLA CKD-EPI: 68 ML/MIN/1.73 M 2
GFR SERPLBLD CREATININE-BSD FMLA CKD-EPI: 69 ML/MIN/1.73 M 2
GFR SERPLBLD CREATININE-BSD FMLA CKD-EPI: 70 ML/MIN/1.73 M 2
GFR SERPLBLD CREATININE-BSD FMLA CKD-EPI: 75 ML/MIN/1.73 M 2
GFR SERPLBLD CREATININE-BSD FMLA CKD-EPI: 88 ML/MIN/1.73 M 2
GFR SERPLBLD CREATININE-BSD FMLA CKD-EPI: 88 ML/MIN/1.73 M 2
GFR SERPLBLD CREATININE-BSD FMLA CKD-EPI: 90 ML/MIN/1.73 M 2
GFR SERPLBLD CREATININE-BSD FMLA CKD-EPI: 94 ML/MIN/1.73 M 2
GLOBULIN SER CALC-MCNC: 2.2 G/DL (ref 1.9–3.5)
GLOBULIN SER CALC-MCNC: 2.4 G/DL (ref 1.9–3.5)
GLOBULIN SER CALC-MCNC: 2.4 G/DL (ref 1.9–3.5)
GLOBULIN SER CALC-MCNC: 2.5 G/DL (ref 1.9–3.5)
GLOBULIN SER CALC-MCNC: 2.6 G/DL (ref 1.9–3.5)
GLOBULIN SER CALC-MCNC: 2.7 G/DL (ref 1.9–3.5)
GLOBULIN SER CALC-MCNC: 2.7 G/DL (ref 1.9–3.5)
GLOBULIN SER CALC-MCNC: 2.8 G/DL (ref 1.9–3.5)
GLUCOSE BLD STRIP.AUTO-MCNC: 169 MG/DL (ref 65–99)
GLUCOSE BLD STRIP.AUTO-MCNC: 169 MG/DL (ref 65–99)
GLUCOSE BLD STRIP.AUTO-MCNC: 179 MG/DL (ref 65–99)
GLUCOSE BLD STRIP.AUTO-MCNC: 184 MG/DL (ref 65–99)
GLUCOSE BLD STRIP.AUTO-MCNC: 185 MG/DL (ref 65–99)
GLUCOSE BLD STRIP.AUTO-MCNC: 186 MG/DL (ref 65–99)
GLUCOSE BLD STRIP.AUTO-MCNC: 192 MG/DL (ref 65–99)
GLUCOSE BLD STRIP.AUTO-MCNC: 192 MG/DL (ref 65–99)
GLUCOSE BLD STRIP.AUTO-MCNC: 200 MG/DL (ref 65–99)
GLUCOSE BLD STRIP.AUTO-MCNC: 200 MG/DL (ref 65–99)
GLUCOSE BLD STRIP.AUTO-MCNC: 201 MG/DL (ref 65–99)
GLUCOSE BLD STRIP.AUTO-MCNC: 206 MG/DL (ref 65–99)
GLUCOSE BLD STRIP.AUTO-MCNC: 208 MG/DL (ref 65–99)
GLUCOSE BLD STRIP.AUTO-MCNC: 213 MG/DL (ref 65–99)
GLUCOSE BLD STRIP.AUTO-MCNC: 214 MG/DL (ref 65–99)
GLUCOSE BLD STRIP.AUTO-MCNC: 215 MG/DL (ref 65–99)
GLUCOSE BLD STRIP.AUTO-MCNC: 218 MG/DL (ref 65–99)
GLUCOSE BLD STRIP.AUTO-MCNC: 226 MG/DL (ref 65–99)
GLUCOSE BLD STRIP.AUTO-MCNC: 234 MG/DL (ref 65–99)
GLUCOSE BLD STRIP.AUTO-MCNC: 235 MG/DL (ref 65–99)
GLUCOSE BLD STRIP.AUTO-MCNC: 240 MG/DL (ref 65–99)
GLUCOSE BLD STRIP.AUTO-MCNC: 245 MG/DL (ref 65–99)
GLUCOSE BLD STRIP.AUTO-MCNC: 251 MG/DL (ref 65–99)
GLUCOSE BLD STRIP.AUTO-MCNC: 256 MG/DL (ref 65–99)
GLUCOSE BLD STRIP.AUTO-MCNC: 257 MG/DL (ref 65–99)
GLUCOSE BLD STRIP.AUTO-MCNC: 261 MG/DL (ref 65–99)
GLUCOSE BLD STRIP.AUTO-MCNC: 285 MG/DL (ref 65–99)
GLUCOSE BLD STRIP.AUTO-MCNC: 288 MG/DL (ref 65–99)
GLUCOSE FLD-MCNC: 139 MG/DL
GLUCOSE SERPL-MCNC: 167 MG/DL (ref 65–99)
GLUCOSE SERPL-MCNC: 191 MG/DL (ref 65–99)
GLUCOSE SERPL-MCNC: 205 MG/DL (ref 65–99)
GLUCOSE SERPL-MCNC: 210 MG/DL (ref 65–99)
GLUCOSE SERPL-MCNC: 229 MG/DL (ref 65–99)
GLUCOSE SERPL-MCNC: 236 MG/DL (ref 65–99)
GLUCOSE SERPL-MCNC: 239 MG/DL (ref 65–99)
GLUCOSE SERPL-MCNC: 239 MG/DL (ref 65–99)
GLUCOSE SERPL-MCNC: 274 MG/DL (ref 65–99)
GLUCOSE SERPL-MCNC: 310 MG/DL (ref 65–99)
GLUCOSE UR STRIP.AUTO-MCNC: NEGATIVE MG/DL
GRAM STN SPEC: NORMAL
HCO3 BLDA-SCNC: 30.7 MMOL/L (ref 17–25)
HCO3 BLDA-SCNC: 31.4 MMOL/L (ref 17–25)
HCO3 BLDA-SCNC: 31.5 MMOL/L (ref 17–25)
HCT VFR BLD AUTO: 33.1 % (ref 42–52)
HCT VFR BLD AUTO: 33.1 % (ref 42–52)
HCT VFR BLD AUTO: 33.8 % (ref 42–52)
HCT VFR BLD AUTO: 34.7 % (ref 42–52)
HCT VFR BLD AUTO: 35.7 % (ref 42–52)
HCT VFR BLD AUTO: 39.7 % (ref 42–52)
HCT VFR BLD AUTO: 40.3 % (ref 42–52)
HCT VFR BLD AUTO: 41.4 % (ref 42–52)
HCT VFR BLD AUTO: 43 % (ref 42–52)
HCT VFR BLD CALC: 44 % (ref 42–52)
HGB BLD-MCNC: 10.8 G/DL (ref 14–18)
HGB BLD-MCNC: 11.3 G/DL (ref 14–18)
HGB BLD-MCNC: 11.5 G/DL (ref 14–18)
HGB BLD-MCNC: 11.5 G/DL (ref 14–18)
HGB BLD-MCNC: 11.9 G/DL (ref 14–18)
HGB BLD-MCNC: 13.7 G/DL (ref 14–18)
HGB BLD-MCNC: 13.8 G/DL (ref 14–18)
HGB BLD-MCNC: 14 G/DL (ref 14–18)
HGB BLD-MCNC: 14.8 G/DL (ref 14–18)
HGB BLD-MCNC: 15 G/DL (ref 14–18)
HOROWITZ INDEX BLDA+IHG-RTO: 135 MM[HG]
HOROWITZ INDEX BLDA+IHG-RTO: 158 MM[HG]
HOROWITZ INDEX BLDA+IHG-RTO: 160 MM[HG]
IMM GRANULOCYTES # BLD AUTO: 0.52 K/UL (ref 0–0.11)
IMM GRANULOCYTES # BLD AUTO: 0.53 K/UL (ref 0–0.11)
IMM GRANULOCYTES # BLD AUTO: 0.65 K/UL (ref 0–0.11)
IMM GRANULOCYTES NFR BLD AUTO: 1.6 % (ref 0–0.9)
IMM GRANULOCYTES NFR BLD AUTO: 1.6 % (ref 0–0.9)
IMM GRANULOCYTES NFR BLD AUTO: 1.7 % (ref 0–0.9)
INR PPP: 1.07 (ref 0.87–1.13)
INR PPP: 1.12 (ref 0.87–1.13)
INR PPP: 1.14 (ref 0.87–1.13)
KETONES UR STRIP.AUTO-MCNC: NEGATIVE MG/DL
LACTATE SERPL-SCNC: 2.9 MMOL/L (ref 0.5–2)
LACTATE SERPL-SCNC: 2.9 MMOL/L (ref 0.5–2)
LACTATE SERPL-SCNC: 3.4 MMOL/L (ref 0.5–2)
LACTATE SERPL-SCNC: 3.6 MMOL/L (ref 0.5–2)
LDH FLD L TO P-CCNC: 930 U/L
LDH SERPL L TO P-CCNC: 318 U/L (ref 107–266)
LEUKOCYTE ESTERASE UR QL STRIP.AUTO: NEGATIVE
LPM ILPM: 60 LPM
LV EJECT FRACT MOD 2C 99903: 29.79
LV EJECT FRACT MOD 4C 99902: 38.24
LV EJECT FRACT MOD BP 99901: 37.94
LYMPHOCYTES # BLD AUTO: 0 K/UL (ref 1–4.8)
LYMPHOCYTES # BLD AUTO: 0.42 K/UL (ref 1–4.8)
LYMPHOCYTES # BLD AUTO: 0.49 K/UL (ref 1–4.8)
LYMPHOCYTES # BLD AUTO: 0.56 K/UL (ref 1–4.8)
LYMPHOCYTES # BLD AUTO: 1.27 K/UL (ref 1–4.8)
LYMPHOCYTES NFR BLD: 0 % (ref 22–41)
LYMPHOCYTES NFR BLD: 1.3 % (ref 22–41)
LYMPHOCYTES NFR BLD: 1.4 % (ref 22–41)
LYMPHOCYTES NFR BLD: 1.5 % (ref 22–41)
LYMPHOCYTES NFR BLD: 2.6 % (ref 22–41)
LYMPHOCYTES NFR FLD: 21 %
MAGNESIUM SERPL-MCNC: 2 MG/DL (ref 1.5–2.5)
MAGNESIUM SERPL-MCNC: 2.1 MG/DL (ref 1.5–2.5)
MAGNESIUM SERPL-MCNC: 2.2 MG/DL (ref 1.5–2.5)
MAGNESIUM SERPL-MCNC: 2.3 MG/DL (ref 1.5–2.5)
MANUAL DIFF BLD: NORMAL
MANUAL DIFF BLD: NORMAL
MCH RBC QN AUTO: 29.1 PG (ref 27–33)
MCH RBC QN AUTO: 29.2 PG (ref 27–33)
MCH RBC QN AUTO: 29.2 PG (ref 27–33)
MCH RBC QN AUTO: 29.3 PG (ref 27–33)
MCH RBC QN AUTO: 29.4 PG (ref 27–33)
MCH RBC QN AUTO: 29.4 PG (ref 27–33)
MCH RBC QN AUTO: 29.5 PG (ref 27–33)
MCH RBC QN AUTO: 29.6 PG (ref 27–33)
MCH RBC QN AUTO: 29.6 PG (ref 27–33)
MCHC RBC AUTO-ENTMCNC: 32.6 G/DL (ref 33.7–35.3)
MCHC RBC AUTO-ENTMCNC: 33.1 G/DL (ref 33.7–35.3)
MCHC RBC AUTO-ENTMCNC: 33.3 G/DL (ref 33.7–35.3)
MCHC RBC AUTO-ENTMCNC: 33.4 G/DL (ref 33.7–35.3)
MCHC RBC AUTO-ENTMCNC: 33.8 G/DL (ref 33.7–35.3)
MCHC RBC AUTO-ENTMCNC: 34.2 G/DL (ref 33.7–35.3)
MCHC RBC AUTO-ENTMCNC: 34.4 G/DL (ref 33.7–35.3)
MCHC RBC AUTO-ENTMCNC: 34.5 G/DL (ref 33.7–35.3)
MCHC RBC AUTO-ENTMCNC: 34.7 G/DL (ref 33.7–35.3)
MCV RBC AUTO: 84.8 FL (ref 81.4–97.8)
MCV RBC AUTO: 85.2 FL (ref 81.4–97.8)
MCV RBC AUTO: 85.3 FL (ref 81.4–97.8)
MCV RBC AUTO: 85.9 FL (ref 81.4–97.8)
MCV RBC AUTO: 86.1 FL (ref 81.4–97.8)
MCV RBC AUTO: 87.3 FL (ref 81.4–97.8)
MCV RBC AUTO: 88.4 FL (ref 81.4–97.8)
MCV RBC AUTO: 89.2 FL (ref 81.4–97.8)
MCV RBC AUTO: 89.9 FL (ref 81.4–97.8)
MICRO URNS: NORMAL
MONOCYTES # BLD AUTO: 1.9 K/UL (ref 0–0.85)
MONOCYTES # BLD AUTO: 1.96 K/UL (ref 0–0.85)
MONOCYTES # BLD AUTO: 2.1 K/UL (ref 0–0.85)
MONOCYTES # BLD AUTO: 2.27 K/UL (ref 0–0.85)
MONOCYTES # BLD AUTO: 3.43 K/UL (ref 0–0.85)
MONOCYTES NFR BLD AUTO: 4.2 % (ref 0–13.4)
MONOCYTES NFR BLD AUTO: 5.4 % (ref 0–13.4)
MONOCYTES NFR BLD AUTO: 5.8 % (ref 0–13.4)
MONOCYTES NFR BLD AUTO: 6 % (ref 0–13.4)
MONOCYTES NFR BLD AUTO: 7 % (ref 0–13.4)
MORPHOLOGY BLD-IMP: NORMAL
MORPHOLOGY BLD-IMP: NORMAL
NEUTROPHILS # BLD AUTO: 29.64 K/UL (ref 1.82–7.42)
NEUTROPHILS # BLD AUTO: 29.91 K/UL (ref 1.82–7.42)
NEUTROPHILS # BLD AUTO: 35.28 K/UL (ref 1.82–7.42)
NEUTROPHILS # BLD AUTO: 44.3 K/UL (ref 1.82–7.42)
NEUTROPHILS # BLD AUTO: 51.73 K/UL (ref 1.82–7.42)
NEUTROPHILS NFR BLD: 90.4 % (ref 44–72)
NEUTROPHILS NFR BLD: 90.9 % (ref 44–72)
NEUTROPHILS NFR BLD: 90.9 % (ref 44–72)
NEUTROPHILS NFR BLD: 91.2 % (ref 44–72)
NEUTROPHILS NFR BLD: 95.8 % (ref 44–72)
NEUTROPHILS NFR FLD: 79 %
NITRITE UR QL STRIP.AUTO: NEGATIVE
NRBC # BLD AUTO: 0 K/UL
NRBC BLD-RTO: 0 /100 WBC
NT-PROBNP SERPL IA-MCNC: 516 PG/ML (ref 0–125)
NT-PROBNP SERPL IA-MCNC: 607 PG/ML (ref 0–125)
O2/TOTAL GAS SETTING VFR VENT: 100 %
O2/TOTAL GAS SETTING VFR VENT: 35 %
O2/TOTAL GAS SETTING VFR VENT: 40 %
OVALOCYTES BLD QL SMEAR: NORMAL
PATH REV: NORMAL
PATH REV: NORMAL
PATHOLOGY CONSULT NOTE: NORMAL
PCO2 BLDA: 53.9 MMHG (ref 26–37)
PCO2 BLDA: 56.3 MMHG (ref 26–37)
PCO2 BLDA: 72.1 MMHG (ref 26–37)
PCO2 TEMP ADJ BLDA: 52.5 MMHG (ref 26–37)
PCO2 TEMP ADJ BLDA: 56.9 MMHG (ref 26–37)
PCO2 TEMP ADJ BLDA: 68.7 MMHG (ref 26–37)
PH BLDA: 7.25 [PH] (ref 7.4–7.5)
PH BLDA: 7.34 [PH] (ref 7.4–7.5)
PH BLDA: 7.37 [PH] (ref 7.4–7.5)
PH FLD: 8 [PH]
PH TEMP ADJ BLDA: 7.26 [PH] (ref 7.4–7.5)
PH TEMP ADJ BLDA: 7.34 [PH] (ref 7.4–7.5)
PH TEMP ADJ BLDA: 7.38 [PH] (ref 7.4–7.5)
PH UR STRIP.AUTO: 5 [PH] (ref 5–8)
PHOSPHATE SERPL-MCNC: 2.1 MG/DL (ref 2.5–4.5)
PHOSPHATE SERPL-MCNC: 2.3 MG/DL (ref 2.5–4.5)
PHOSPHATE SERPL-MCNC: 2.7 MG/DL (ref 2.5–4.5)
PHOSPHATE SERPL-MCNC: 2.8 MG/DL (ref 2.5–4.5)
PHOSPHATE SERPL-MCNC: 3.1 MG/DL (ref 2.5–4.5)
PHOSPHATE SERPL-MCNC: 3.6 MG/DL (ref 2.5–4.5)
PLATELET # BLD AUTO: 166 K/UL (ref 164–446)
PLATELET # BLD AUTO: 183 K/UL (ref 164–446)
PLATELET # BLD AUTO: 202 K/UL (ref 164–446)
PLATELET # BLD AUTO: 208 K/UL (ref 164–446)
PLATELET # BLD AUTO: 216 K/UL (ref 164–446)
PLATELET # BLD AUTO: 245 K/UL (ref 164–446)
PLATELET # BLD AUTO: 246 K/UL (ref 164–446)
PLATELET # BLD AUTO: 348 K/UL (ref 164–446)
PLATELET # BLD AUTO: 351 K/UL (ref 164–446)
PLATELET BLD QL SMEAR: NORMAL
PLATELET BLD QL SMEAR: NORMAL
PMV BLD AUTO: 10.2 FL (ref 9–12.9)
PMV BLD AUTO: 9.3 FL (ref 9–12.9)
PMV BLD AUTO: 9.4 FL (ref 9–12.9)
PMV BLD AUTO: 9.5 FL (ref 9–12.9)
PMV BLD AUTO: 9.7 FL (ref 9–12.9)
PMV BLD AUTO: 9.8 FL (ref 9–12.9)
PO2 BLDA: 135 MMHG (ref 64–87)
PO2 BLDA: 56 MMHG (ref 64–87)
PO2 BLDA: 63 MMHG (ref 64–87)
PO2 TEMP ADJ BLDA: 128 MMHG (ref 64–87)
PO2 TEMP ADJ BLDA: 57 MMHG (ref 64–87)
PO2 TEMP ADJ BLDA: 60 MMHG (ref 64–87)
POIKILOCYTOSIS BLD QL SMEAR: NORMAL
POIKILOCYTOSIS BLD QL SMEAR: NORMAL
POTASSIUM SERPL-SCNC: 3.9 MMOL/L (ref 3.6–5.5)
POTASSIUM SERPL-SCNC: 4.7 MMOL/L (ref 3.6–5.5)
POTASSIUM SERPL-SCNC: 4.8 MMOL/L (ref 3.6–5.5)
POTASSIUM SERPL-SCNC: 5.1 MMOL/L (ref 3.6–5.5)
POTASSIUM SERPL-SCNC: 5.1 MMOL/L (ref 3.6–5.5)
POTASSIUM SERPL-SCNC: 5.3 MMOL/L (ref 3.6–5.5)
POTASSIUM SERPL-SCNC: 5.4 MMOL/L (ref 3.6–5.5)
POTASSIUM SERPL-SCNC: 5.6 MMOL/L (ref 3.6–5.5)
POTASSIUM SERPL-SCNC: 5.7 MMOL/L (ref 3.6–5.5)
POTASSIUM SERPL-SCNC: 5.9 MMOL/L (ref 3.6–5.5)
PROCALCITONIN SERPL-MCNC: 0.11 NG/ML
PROT FLD-MCNC: 2.8 G/DL
PROT SERPL-MCNC: 4.6 G/DL (ref 6–8.2)
PROT SERPL-MCNC: 4.8 G/DL (ref 6–8.2)
PROT SERPL-MCNC: 4.9 G/DL (ref 6–8.2)
PROT SERPL-MCNC: 5 G/DL (ref 6–8.2)
PROT SERPL-MCNC: 5.2 G/DL (ref 6–8.2)
PROT SERPL-MCNC: 5.2 G/DL (ref 6–8.2)
PROT SERPL-MCNC: 5.3 G/DL (ref 6–8.2)
PROT SERPL-MCNC: 5.6 G/DL (ref 6–8.2)
PROT UR QL STRIP: NEGATIVE MG/DL
PROTHROMBIN TIME: 13.8 SEC (ref 12–14.6)
PROTHROMBIN TIME: 14.3 SEC (ref 12–14.6)
PROTHROMBIN TIME: 14.4 SEC (ref 12–14.6)
RBC # BLD AUTO: 3.68 M/UL (ref 4.7–6.1)
RBC # BLD AUTO: 3.87 M/UL (ref 4.7–6.1)
RBC # BLD AUTO: 3.88 M/UL (ref 4.7–6.1)
RBC # BLD AUTO: 3.89 M/UL (ref 4.7–6.1)
RBC # BLD AUTO: 4.04 M/UL (ref 4.7–6.1)
RBC # BLD AUTO: 4.66 M/UL (ref 4.7–6.1)
RBC # BLD AUTO: 4.69 M/UL (ref 4.7–6.1)
RBC # BLD AUTO: 4.81 M/UL (ref 4.7–6.1)
RBC # BLD AUTO: 5.07 M/UL (ref 4.7–6.1)
RBC # FLD: NORMAL CELLS/UL
RBC BLD AUTO: PRESENT
RBC BLD AUTO: PRESENT
RBC UR QL AUTO: NEGATIVE
RH BLD: ABNORMAL
RHODAMINE-AURAMINE STN SPEC: NORMAL
RSV RNA SPEC QL NAA+PROBE: NEGATIVE
SAO2 % BLDA: 86 % (ref 93–99)
SAO2 % BLDA: 90 % (ref 93–99)
SAO2 % BLDA: 98 % (ref 93–99)
SARS-COV-2 RNA RESP QL NAA+PROBE: NOTDETECTED
SCCMEC + MECA PNL NOSE NAA+PROBE: NEGATIVE
SIGNIFICANT IND 70042: NORMAL
SITE SITE: NORMAL
SODIUM SERPL-SCNC: 119 MMOL/L (ref 135–145)
SODIUM SERPL-SCNC: 122 MMOL/L (ref 135–145)
SODIUM SERPL-SCNC: 122 MMOL/L (ref 135–145)
SODIUM SERPL-SCNC: 123 MMOL/L (ref 135–145)
SODIUM SERPL-SCNC: 123 MMOL/L (ref 135–145)
SODIUM SERPL-SCNC: 125 MMOL/L (ref 135–145)
SODIUM SERPL-SCNC: 126 MMOL/L (ref 135–145)
SODIUM SERPL-SCNC: 126 MMOL/L (ref 135–145)
SODIUM SERPL-SCNC: 128 MMOL/L (ref 135–145)
SODIUM SERPL-SCNC: 130 MMOL/L (ref 135–145)
SOURCE SOURCE: NORMAL
SP GR UR STRIP.AUTO: 1.01
SPECIMEN DRAWN FROM PATIENT: ABNORMAL
SPECIMEN SOURCE: NORMAL
TROPONIN T SERPL-MCNC: 22 NG/L (ref 6–19)
UFH PPP CHRO-ACNC: 0.38 IU/ML
UFH PPP CHRO-ACNC: 0.41 IU/ML
UFH PPP CHRO-ACNC: 0.47 IU/ML
UFH PPP CHRO-ACNC: 0.66 IU/ML
UFH PPP CHRO-ACNC: 0.7 IU/ML
UFH PPP CHRO-ACNC: 0.72 IU/ML
UFH PPP CHRO-ACNC: <0.1 IU/ML
UFH PPP CHRO-ACNC: >1.1 IU/ML
UFH PPP CHRO-ACNC: >1.1 IU/ML
UROBILINOGEN UR STRIP.AUTO-MCNC: 0.2 MG/DL
WBC # BLD AUTO: 32.6 K/UL (ref 4.8–10.8)
WBC # BLD AUTO: 32.9 K/UL (ref 4.8–10.8)
WBC # BLD AUTO: 33 K/UL (ref 4.8–10.8)
WBC # BLD AUTO: 33.5 K/UL (ref 4.8–10.8)
WBC # BLD AUTO: 38.7 K/UL (ref 4.8–10.8)
WBC # BLD AUTO: 45.5 K/UL (ref 4.8–10.8)
WBC # BLD AUTO: 46.1 K/UL (ref 4.8–10.8)
WBC # BLD AUTO: 49 K/UL (ref 4.8–10.8)
WBC # BLD AUTO: 54 K/UL (ref 4.8–10.8)
WBC # FLD: 557 CELLS/UL

## 2022-01-01 PROCEDURE — 99231 SBSQ HOSP IP/OBS SF/LOW 25: CPT | Mod: 25 | Performed by: SURGERY

## 2022-01-01 PROCEDURE — 37799 UNLISTED PX VASCULAR SURGERY: CPT

## 2022-01-01 PROCEDURE — 83735 ASSAY OF MAGNESIUM: CPT

## 2022-01-01 PROCEDURE — 32100 EXPLORATION OF CHEST: CPT | Performed by: SURGERY

## 2022-01-01 PROCEDURE — 85520 HEPARIN ASSAY: CPT

## 2022-01-01 PROCEDURE — 770022 HCHG ROOM/CARE - ICU (200)

## 2022-01-01 PROCEDURE — 82378 CARCINOEMBRYONIC ANTIGEN: CPT

## 2022-01-01 PROCEDURE — 700105 HCHG RX REV CODE 258: Performed by: HOSPITALIST

## 2022-01-01 PROCEDURE — 80048 BASIC METABOLIC PNL TOTAL CA: CPT

## 2022-01-01 PROCEDURE — 87206 SMEAR FLUORESCENT/ACID STAI: CPT

## 2022-01-01 PROCEDURE — 82962 GLUCOSE BLOOD TEST: CPT | Mod: 91

## 2022-01-01 PROCEDURE — 0BNC4ZZ RELEASE RIGHT UPPER LUNG LOBE, PERCUTANEOUS ENDOSCOPIC APPROACH: ICD-10-PCS | Performed by: SURGERY

## 2022-01-01 PROCEDURE — 85027 COMPLETE CBC AUTOMATED: CPT

## 2022-01-01 PROCEDURE — 700102 HCHG RX REV CODE 250 W/ 637 OVERRIDE(OP): Performed by: HOSPITALIST

## 2022-01-01 PROCEDURE — A9270 NON-COVERED ITEM OR SERVICE: HCPCS | Performed by: HOSPITALIST

## 2022-01-01 PROCEDURE — 82803 BLOOD GASES ANY COMBINATION: CPT

## 2022-01-01 PROCEDURE — A9270 NON-COVERED ITEM OR SERVICE: HCPCS | Performed by: INTERNAL MEDICINE

## 2022-01-01 PROCEDURE — 160048 HCHG OR STATISTICAL LEVEL 1-5: Performed by: SURGERY

## 2022-01-01 PROCEDURE — 88305 TISSUE EXAM BY PATHOLOGIST: CPT

## 2022-01-01 PROCEDURE — 84145 PROCALCITONIN (PCT): CPT

## 2022-01-01 PROCEDURE — 84100 ASSAY OF PHOSPHORUS: CPT

## 2022-01-01 PROCEDURE — 700102 HCHG RX REV CODE 250 W/ 637 OVERRIDE(OP): Performed by: NURSE PRACTITIONER

## 2022-01-01 PROCEDURE — A9270 NON-COVERED ITEM OR SERVICE: HCPCS | Performed by: STUDENT IN AN ORGANIZED HEALTH CARE EDUCATION/TRAINING PROGRAM

## 2022-01-01 PROCEDURE — 94640 AIRWAY INHALATION TREATMENT: CPT

## 2022-01-01 PROCEDURE — 80162 ASSAY OF DIGOXIN TOTAL: CPT

## 2022-01-01 PROCEDURE — 700111 HCHG RX REV CODE 636 W/ 250 OVERRIDE (IP): Performed by: SURGERY

## 2022-01-01 PROCEDURE — 86900 BLOOD TYPING SEROLOGIC ABO: CPT | Mod: 91

## 2022-01-01 PROCEDURE — 80053 COMPREHEN METABOLIC PANEL: CPT

## 2022-01-01 PROCEDURE — 93010 ELECTROCARDIOGRAM REPORT: CPT | Performed by: INTERNAL MEDICINE

## 2022-01-01 PROCEDURE — 700111 HCHG RX REV CODE 636 W/ 250 OVERRIDE (IP): Performed by: HOSPITALIST

## 2022-01-01 PROCEDURE — 82962 GLUCOSE BLOOD TEST: CPT

## 2022-01-01 PROCEDURE — 99291 CRITICAL CARE FIRST HOUR: CPT | Performed by: STUDENT IN AN ORGANIZED HEALTH CARE EDUCATION/TRAINING PROGRAM

## 2022-01-01 PROCEDURE — 3E0T3BZ INTRODUCTION OF ANESTHETIC AGENT INTO PERIPHERAL NERVES AND PLEXI, PERCUTANEOUS APPROACH: ICD-10-PCS | Performed by: SURGERY

## 2022-01-01 PROCEDURE — 700111 HCHG RX REV CODE 636 W/ 250 OVERRIDE (IP): Performed by: ANESTHESIOLOGY

## 2022-01-01 PROCEDURE — 700102 HCHG RX REV CODE 250 W/ 637 OVERRIDE(OP): Performed by: INTERNAL MEDICINE

## 2022-01-01 PROCEDURE — 700105 HCHG RX REV CODE 258: Performed by: SURGERY

## 2022-01-01 PROCEDURE — 88313 SPECIAL STAINS GROUP 2: CPT

## 2022-01-01 PROCEDURE — 36620 INSERTION CATHETER ARTERY: CPT | Mod: LT | Performed by: ANESTHESIOLOGY

## 2022-01-01 PROCEDURE — 160029 HCHG SURGERY MINUTES - 1ST 30 MINS LEVEL 4: Performed by: SURGERY

## 2022-01-01 PROCEDURE — 99233 SBSQ HOSP IP/OBS HIGH 50: CPT | Performed by: HOSPITALIST

## 2022-01-01 PROCEDURE — 94669 MECHANICAL CHEST WALL OSCILL: CPT

## 2022-01-01 PROCEDURE — 99232 SBSQ HOSP IP/OBS MODERATE 35: CPT | Performed by: SURGERY

## 2022-01-01 PROCEDURE — 99223 1ST HOSP IP/OBS HIGH 75: CPT | Performed by: INTERNAL MEDICINE

## 2022-01-01 PROCEDURE — 700101 HCHG RX REV CODE 250: Performed by: NURSE PRACTITIONER

## 2022-01-01 PROCEDURE — 32551 INSERTION OF CHEST TUBE: CPT

## 2022-01-01 PROCEDURE — 700102 HCHG RX REV CODE 250 W/ 637 OVERRIDE(OP): Performed by: SURGERY

## 2022-01-01 PROCEDURE — 71045 X-RAY EXAM CHEST 1 VIEW: CPT

## 2022-01-01 PROCEDURE — 700111 HCHG RX REV CODE 636 W/ 250 OVERRIDE (IP): Performed by: STUDENT IN AN ORGANIZED HEALTH CARE EDUCATION/TRAINING PROGRAM

## 2022-01-01 PROCEDURE — 99233 SBSQ HOSP IP/OBS HIGH 50: CPT | Mod: FS | Performed by: INTERNAL MEDICINE

## 2022-01-01 PROCEDURE — 83605 ASSAY OF LACTIC ACID: CPT

## 2022-01-01 PROCEDURE — 92526 ORAL FUNCTION THERAPY: CPT

## 2022-01-01 PROCEDURE — 87086 URINE CULTURE/COLONY COUNT: CPT

## 2022-01-01 PROCEDURE — 36556 INSERT NON-TUNNEL CV CATH: CPT | Mod: 59,RT | Performed by: ANESTHESIOLOGY

## 2022-01-01 PROCEDURE — 0W9930Z DRAINAGE OF RIGHT PLEURAL CAVITY WITH DRAINAGE DEVICE, PERCUTANEOUS APPROACH: ICD-10-PCS | Performed by: SURGERY

## 2022-01-01 PROCEDURE — 99221 1ST HOSP IP/OBS SF/LOW 40: CPT | Performed by: SURGERY

## 2022-01-01 PROCEDURE — 85025 COMPLETE CBC W/AUTO DIFF WBC: CPT

## 2022-01-01 PROCEDURE — 82945 GLUCOSE OTHER FLUID: CPT

## 2022-01-01 PROCEDURE — 83615 LACTATE (LD) (LDH) ENZYME: CPT

## 2022-01-01 PROCEDURE — 700102 HCHG RX REV CODE 250 W/ 637 OVERRIDE(OP): Performed by: STUDENT IN AN ORGANIZED HEALTH CARE EDUCATION/TRAINING PROGRAM

## 2022-01-01 PROCEDURE — 87070 CULTURE OTHR SPECIMN AEROBIC: CPT

## 2022-01-01 PROCEDURE — 81003 URINALYSIS AUTO W/O SCOPE: CPT

## 2022-01-01 PROCEDURE — 700105 HCHG RX REV CODE 258: Performed by: NURSE PRACTITIONER

## 2022-01-01 PROCEDURE — 770000 HCHG ROOM/CARE - INTERMEDIATE ICU *

## 2022-01-01 PROCEDURE — 84157 ASSAY OF PROTEIN OTHER: CPT

## 2022-01-01 PROCEDURE — 99140 ANES COMP EMERGENCY COND: CPT | Performed by: ANESTHESIOLOGY

## 2022-01-01 PROCEDURE — 85007 BL SMEAR W/DIFF WBC COUNT: CPT

## 2022-01-01 PROCEDURE — 36415 COLL VENOUS BLD VENIPUNCTURE: CPT

## 2022-01-01 PROCEDURE — 700101 HCHG RX REV CODE 250: Performed by: HOSPITALIST

## 2022-01-01 PROCEDURE — 99291 CRITICAL CARE FIRST HOUR: CPT | Performed by: INTERNAL MEDICINE

## 2022-01-01 PROCEDURE — A9270 NON-COVERED ITEM OR SERVICE: HCPCS | Performed by: SURGERY

## 2022-01-01 PROCEDURE — 700101 HCHG RX REV CODE 250: Performed by: STUDENT IN AN ORGANIZED HEALTH CARE EDUCATION/TRAINING PROGRAM

## 2022-01-01 PROCEDURE — 87205 SMEAR GRAM STAIN: CPT

## 2022-01-01 PROCEDURE — 94760 N-INVAS EAR/PLS OXIMETRY 1: CPT

## 2022-01-01 PROCEDURE — 700101 HCHG RX REV CODE 250: Performed by: ANESTHESIOLOGY

## 2022-01-01 PROCEDURE — 92610 EVALUATE SWALLOWING FUNCTION: CPT

## 2022-01-01 PROCEDURE — 700105 HCHG RX REV CODE 258: Performed by: STUDENT IN AN ORGANIZED HEALTH CARE EDUCATION/TRAINING PROGRAM

## 2022-01-01 PROCEDURE — 93306 TTE W/DOPPLER COMPLETE: CPT

## 2022-01-01 PROCEDURE — 32551 INSERTION OF CHEST TUBE: CPT | Performed by: SURGERY

## 2022-01-01 PROCEDURE — 87075 CULTR BACTERIA EXCEPT BLOOD: CPT

## 2022-01-01 PROCEDURE — 85730 THROMBOPLASTIN TIME PARTIAL: CPT

## 2022-01-01 PROCEDURE — 87015 SPECIMEN INFECT AGNT CONCNTJ: CPT

## 2022-01-01 PROCEDURE — 87116 MYCOBACTERIA CULTURE: CPT

## 2022-01-01 PROCEDURE — 99221 1ST HOSP IP/OBS SF/LOW 40: CPT | Performed by: INTERNAL MEDICINE

## 2022-01-01 PROCEDURE — 71275 CT ANGIOGRAPHY CHEST: CPT

## 2022-01-01 PROCEDURE — 160002 HCHG RECOVERY MINUTES (STAT): Performed by: SURGERY

## 2022-01-01 PROCEDURE — 87641 MR-STAPH DNA AMP PROBE: CPT

## 2022-01-01 PROCEDURE — 0BND4ZZ RELEASE RIGHT MIDDLE LUNG LOBE, PERCUTANEOUS ENDOSCOPIC APPROACH: ICD-10-PCS | Performed by: SURGERY

## 2022-01-01 PROCEDURE — 160009 HCHG ANES TIME/MIN: Performed by: SURGERY

## 2022-01-01 PROCEDURE — 99214 OFFICE O/P EST MOD 30 MIN: CPT | Performed by: INTERNAL MEDICINE

## 2022-01-01 PROCEDURE — 700117 HCHG RX CONTRAST REV CODE 255: Performed by: HOSPITALIST

## 2022-01-01 PROCEDURE — 85610 PROTHROMBIN TIME: CPT

## 2022-01-01 PROCEDURE — A9270 NON-COVERED ITEM OR SERVICE: HCPCS | Performed by: NURSE PRACTITIONER

## 2022-01-01 PROCEDURE — 88342 IMHCHEM/IMCYTCHM 1ST ANTB: CPT

## 2022-01-01 PROCEDURE — C1729 CATH, DRAINAGE: HCPCS

## 2022-01-01 PROCEDURE — 83880 ASSAY OF NATRIURETIC PEPTIDE: CPT

## 2022-01-01 PROCEDURE — 84484 ASSAY OF TROPONIN QUANT: CPT

## 2022-01-01 PROCEDURE — 160041 HCHG SURGERY MINUTES - EA ADDL 1 MIN LEVEL 4: Performed by: SURGERY

## 2022-01-01 PROCEDURE — 93005 ELECTROCARDIOGRAM TRACING: CPT | Performed by: STUDENT IN AN ORGANIZED HEALTH CARE EDUCATION/TRAINING PROGRAM

## 2022-01-01 PROCEDURE — 88341 IMHCHEM/IMCYTCHM EA ADD ANTB: CPT

## 2022-01-01 PROCEDURE — 85652 RBC SED RATE AUTOMATED: CPT

## 2022-01-01 PROCEDURE — 82533 TOTAL CORTISOL: CPT

## 2022-01-01 PROCEDURE — 160035 HCHG PACU - 1ST 60 MINS PHASE I: Performed by: SURGERY

## 2022-01-01 PROCEDURE — 93283 PRGRMG EVAL IMPLANTABLE DFB: CPT | Performed by: NURSE PRACTITIONER

## 2022-01-01 PROCEDURE — 86140 C-REACTIVE PROTEIN: CPT

## 2022-01-01 PROCEDURE — 88307 TISSUE EXAM BY PATHOLOGIST: CPT

## 2022-01-01 PROCEDURE — 83986 ASSAY PH BODY FLUID NOS: CPT

## 2022-01-01 PROCEDURE — 0BBD4ZX EXCISION OF RIGHT MIDDLE LUNG LOBE, PERCUTANEOUS ENDOSCOPIC APPROACH, DIAGNOSTIC: ICD-10-PCS | Performed by: SURGERY

## 2022-01-01 PROCEDURE — 99232 SBSQ HOSP IP/OBS MODERATE 35: CPT | Mod: FS | Performed by: INTERNAL MEDICINE

## 2022-01-01 PROCEDURE — 700105 HCHG RX REV CODE 258: Performed by: ANESTHESIOLOGY

## 2022-01-01 PROCEDURE — 80503 PATH CLIN CONSLTJ SF 5-20: CPT

## 2022-01-01 PROCEDURE — C9803 HOPD COVID-19 SPEC COLLECT: HCPCS | Performed by: STUDENT IN AN ORGANIZED HEALTH CARE EDUCATION/TRAINING PROGRAM

## 2022-01-01 PROCEDURE — 0241U HCHG SARS-COV-2 COVID-19 NFCT DS RESP RNA 4 TRGT MIC: CPT

## 2022-01-01 PROCEDURE — 88112 CYTOPATH CELL ENHANCE TECH: CPT

## 2022-01-01 PROCEDURE — 32225 PARTIAL RELEASE OF LUNG: CPT | Mod: 59 | Performed by: SURGERY

## 2022-01-01 PROCEDURE — 86901 BLOOD TYPING SEROLOGIC RH(D): CPT

## 2022-01-01 PROCEDURE — 00540 ANES THORACOTOMY PX NOS: CPT | Performed by: ANESTHESIOLOGY

## 2022-01-01 PROCEDURE — 89051 BODY FLUID CELL COUNT: CPT

## 2022-01-01 PROCEDURE — 86850 RBC ANTIBODY SCREEN: CPT

## 2022-01-01 PROCEDURE — 93005 ELECTROCARDIOGRAM TRACING: CPT | Performed by: HOSPITALIST

## 2022-01-01 PROCEDURE — 93306 TTE W/DOPPLER COMPLETE: CPT | Mod: 26 | Performed by: INTERNAL MEDICINE

## 2022-01-01 PROCEDURE — 85014 HEMATOCRIT: CPT

## 2022-01-01 PROCEDURE — 82803 BLOOD GASES ANY COMBINATION: CPT | Mod: 91

## 2022-01-01 PROCEDURE — 88341 IMHCHEM/IMCYTCHM EA ADD ANTB: CPT | Mod: 91

## 2022-01-01 RX ORDER — METHOCARBAMOL 500 MG/1
500 TABLET, FILM COATED ORAL 4 TIMES DAILY
Status: DISCONTINUED | OUTPATIENT
Start: 2022-01-01 | End: 2022-01-01

## 2022-01-01 RX ORDER — TRAMADOL HYDROCHLORIDE 50 MG/1
50 TABLET ORAL EVERY 4 HOURS PRN
Status: ON HOLD | COMMUNITY
End: 2022-01-01

## 2022-01-01 RX ORDER — LISINOPRIL 5 MG/1
2.5 TABLET ORAL DAILY
Status: DISCONTINUED | OUTPATIENT
Start: 2022-01-01 | End: 2022-01-01

## 2022-01-01 RX ORDER — ACETAMINOPHEN 325 MG/1
650 TABLET ORAL 4 TIMES DAILY
Status: DISCONTINUED | OUTPATIENT
Start: 2022-01-01 | End: 2022-01-01

## 2022-01-01 RX ORDER — AMOXICILLIN 250 MG
2 CAPSULE ORAL 2 TIMES DAILY
Status: DISCONTINUED | OUTPATIENT
Start: 2022-01-01 | End: 2022-01-01

## 2022-01-01 RX ORDER — HEPARIN SODIUM 5000 [USP'U]/100ML
0-30 INJECTION, SOLUTION INTRAVENOUS CONTINUOUS
Status: DISCONTINUED | OUTPATIENT
Start: 2022-01-01 | End: 2022-01-01

## 2022-01-01 RX ORDER — DIGOXIN 0.25 MG/ML
125 INJECTION INTRAMUSCULAR; INTRAVENOUS DAILY
Status: DISCONTINUED | OUTPATIENT
Start: 2022-01-01 | End: 2022-01-01

## 2022-01-01 RX ORDER — SODIUM CHLORIDE 1 G/1
1 TABLET ORAL
Status: DISCONTINUED | OUTPATIENT
Start: 2022-01-01 | End: 2022-01-01

## 2022-01-01 RX ORDER — ONDANSETRON 2 MG/ML
INJECTION INTRAMUSCULAR; INTRAVENOUS PRN
Status: DISCONTINUED | OUTPATIENT
Start: 2022-01-01 | End: 2022-01-01 | Stop reason: SURG

## 2022-01-01 RX ORDER — HYDROCODONE BITARTRATE AND ACETAMINOPHEN 7.5; 325 MG/1; MG/1
1 TABLET ORAL EVERY 6 HOURS PRN
Status: ON HOLD | COMMUNITY
End: 2022-01-01

## 2022-01-01 RX ORDER — MIDAZOLAM HYDROCHLORIDE 1 MG/ML
4 INJECTION INTRAMUSCULAR; INTRAVENOUS
Status: DISCONTINUED | OUTPATIENT
Start: 2022-01-01 | End: 2022-11-03 | Stop reason: HOSPADM

## 2022-01-01 RX ORDER — FERROUS SULFATE 325(65) MG
325 TABLET ORAL DAILY
COMMUNITY

## 2022-01-01 RX ORDER — LABETALOL HYDROCHLORIDE 5 MG/ML
10 INJECTION, SOLUTION INTRAVENOUS EVERY 4 HOURS PRN
Status: DISCONTINUED | OUTPATIENT
Start: 2022-01-01 | End: 2022-01-01

## 2022-01-01 RX ORDER — IPRATROPIUM BROMIDE AND ALBUTEROL SULFATE 2.5; .5 MG/3ML; MG/3ML
3 SOLUTION RESPIRATORY (INHALATION)
Status: DISCONTINUED | OUTPATIENT
Start: 2022-01-01 | End: 2022-01-01

## 2022-01-01 RX ORDER — ACETAMINOPHEN 650 MG/1
650 SUPPOSITORY RECTAL EVERY 4 HOURS PRN
Status: DISCONTINUED | OUTPATIENT
Start: 2022-01-01 | End: 2022-11-03 | Stop reason: HOSPADM

## 2022-01-01 RX ORDER — LORAZEPAM 2 MG/1
2 TABLET ORAL
Status: COMPLETED | OUTPATIENT
Start: 2022-01-01 | End: 2022-01-01

## 2022-01-01 RX ORDER — MAGNESIUM SULFATE 1 G/100ML
1 INJECTION INTRAVENOUS ONCE
Status: DISCONTINUED | OUTPATIENT
Start: 2022-01-01 | End: 2022-01-01

## 2022-01-01 RX ORDER — POTASSIUM CHLORIDE 20 MEQ/1
20 TABLET, EXTENDED RELEASE ORAL ONCE
Status: DISCONTINUED | OUTPATIENT
Start: 2022-01-01 | End: 2022-01-01

## 2022-01-01 RX ORDER — HEPARIN SODIUM 1000 [USP'U]/ML
40 INJECTION, SOLUTION INTRAVENOUS; SUBCUTANEOUS PRN
Status: DISCONTINUED | OUTPATIENT
Start: 2022-01-01 | End: 2022-01-01

## 2022-01-01 RX ORDER — MIDAZOLAM HYDROCHLORIDE 1 MG/ML
INJECTION INTRAMUSCULAR; INTRAVENOUS PRN
Status: DISCONTINUED | OUTPATIENT
Start: 2022-01-01 | End: 2022-01-01 | Stop reason: SURG

## 2022-01-01 RX ORDER — PREGABALIN 25 MG/1
25 CAPSULE ORAL 2 TIMES DAILY
Status: DISCONTINUED | OUTPATIENT
Start: 2022-01-01 | End: 2022-01-01

## 2022-01-01 RX ORDER — CEFAZOLIN SODIUM 1 G/3ML
INJECTION, POWDER, FOR SOLUTION INTRAMUSCULAR; INTRAVENOUS PRN
Status: DISCONTINUED | OUTPATIENT
Start: 2022-01-01 | End: 2022-01-01 | Stop reason: SURG

## 2022-01-01 RX ORDER — TRAZODONE HYDROCHLORIDE 100 MG/1
100 TABLET ORAL NIGHTLY
Status: DISCONTINUED | OUTPATIENT
Start: 2022-01-01 | End: 2022-01-01

## 2022-01-01 RX ORDER — AZITHROMYCIN 250 MG/1
250-500 TABLET, FILM COATED ORAL DAILY
COMMUNITY

## 2022-01-01 RX ORDER — ATORVASTATIN CALCIUM 40 MG/1
40 TABLET, FILM COATED ORAL EVERY EVENING
Status: DISCONTINUED | OUTPATIENT
Start: 2022-01-01 | End: 2022-01-01

## 2022-01-01 RX ORDER — CYCLOBENZAPRINE HCL 10 MG
10 TABLET ORAL 3 TIMES DAILY PRN
Status: DISCONTINUED | OUTPATIENT
Start: 2022-01-01 | End: 2022-01-01

## 2022-01-01 RX ORDER — HALOPERIDOL 5 MG/ML
5 INJECTION INTRAMUSCULAR EVERY 6 HOURS PRN
Status: DISCONTINUED | OUTPATIENT
Start: 2022-01-01 | End: 2022-11-03 | Stop reason: HOSPADM

## 2022-01-01 RX ORDER — AMOXICILLIN AND CLAVULANATE POTASSIUM 875; 125 MG/1; MG/1
1 TABLET, FILM COATED ORAL 2 TIMES DAILY
COMMUNITY

## 2022-01-01 RX ORDER — HYDROMORPHONE HYDROCHLORIDE 1 MG/ML
1 INJECTION, SOLUTION INTRAMUSCULAR; INTRAVENOUS; SUBCUTANEOUS
Status: DISCONTINUED | OUTPATIENT
Start: 2022-01-01 | End: 2022-01-01

## 2022-01-01 RX ORDER — ONDANSETRON 2 MG/ML
8 INJECTION INTRAMUSCULAR; INTRAVENOUS EVERY 4 HOURS PRN
Status: DISCONTINUED | OUTPATIENT
Start: 2022-01-01 | End: 2022-11-03 | Stop reason: HOSPADM

## 2022-01-01 RX ORDER — DIGOXIN 0.25 MG/ML
125 INJECTION INTRAMUSCULAR; INTRAVENOUS ONCE
Status: COMPLETED | OUTPATIENT
Start: 2022-01-01 | End: 2022-01-01

## 2022-01-01 RX ORDER — ALBUTEROL SULFATE 90 UG/1
2 AEROSOL, METERED RESPIRATORY (INHALATION)
Status: DISCONTINUED | OUTPATIENT
Start: 2022-01-01 | End: 2022-01-01

## 2022-01-01 RX ORDER — CELECOXIB 100 MG/1
100 CAPSULE ORAL DAILY
Status: DISCONTINUED | OUTPATIENT
Start: 2022-01-01 | End: 2022-01-01

## 2022-01-01 RX ORDER — FUROSEMIDE 10 MG/ML
80 INJECTION INTRAMUSCULAR; INTRAVENOUS
Status: DISCONTINUED | OUTPATIENT
Start: 2022-01-01 | End: 2022-01-01

## 2022-01-01 RX ORDER — MENTHOL AND METHYL SALICYLATE 7.6; 29 G/100G; G/100G
OINTMENT TOPICAL PRN
Status: DISCONTINUED | OUTPATIENT
Start: 2022-01-01 | End: 2022-01-01

## 2022-01-01 RX ORDER — SODIUM CHLORIDE 1 G/1
2 TABLET ORAL
Status: DISCONTINUED | OUTPATIENT
Start: 2022-01-01 | End: 2022-01-01

## 2022-01-01 RX ORDER — MAGNESIUM SULFATE HEPTAHYDRATE 40 MG/ML
2 INJECTION, SOLUTION INTRAVENOUS ONCE
Status: COMPLETED | OUTPATIENT
Start: 2022-01-01 | End: 2022-01-01

## 2022-01-01 RX ORDER — ALBUTEROL SULFATE 90 UG/1
2 AEROSOL, METERED RESPIRATORY (INHALATION) EVERY 6 HOURS PRN
COMMUNITY

## 2022-01-01 RX ORDER — DEXTROSE MONOHYDRATE 50 MG/ML
INJECTION, SOLUTION INTRAVENOUS CONTINUOUS
Status: DISCONTINUED | OUTPATIENT
Start: 2022-01-01 | End: 2022-01-01

## 2022-01-01 RX ORDER — GUAIFENESIN/DEXTROMETHORPHAN 100-10MG/5
10 SYRUP ORAL EVERY 6 HOURS PRN
Status: DISCONTINUED | OUTPATIENT
Start: 2022-01-01 | End: 2022-01-01

## 2022-01-01 RX ORDER — SPIRONOLACTONE 25 MG/1
25 TABLET ORAL DAILY
Status: DISCONTINUED | OUTPATIENT
Start: 2022-01-01 | End: 2022-01-01

## 2022-01-01 RX ORDER — LABETALOL HYDROCHLORIDE 5 MG/ML
5 INJECTION, SOLUTION INTRAVENOUS
Status: DISCONTINUED | OUTPATIENT
Start: 2022-01-01 | End: 2022-01-01 | Stop reason: HOSPADM

## 2022-01-01 RX ORDER — FLUTICASONE PROPIONATE AND SALMETEROL 250; 50 UG/1; UG/1
1 POWDER RESPIRATORY (INHALATION) EVERY 12 HOURS
COMMUNITY

## 2022-01-01 RX ORDER — SODIUM CHLORIDE, SODIUM LACTATE, POTASSIUM CHLORIDE, AND CALCIUM CHLORIDE .6; .31; .03; .02 G/100ML; G/100ML; G/100ML; G/100ML
500 INJECTION, SOLUTION INTRAVENOUS ONCE
Status: COMPLETED | OUTPATIENT
Start: 2022-01-01 | End: 2022-01-01

## 2022-01-01 RX ORDER — OXYCODONE AND ACETAMINOPHEN 10; 325 MG/1; MG/1
1 TABLET ORAL EVERY 6 HOURS PRN
Status: DISCONTINUED | OUTPATIENT
Start: 2022-01-01 | End: 2022-01-01

## 2022-01-01 RX ORDER — POLYETHYLENE GLYCOL 3350 17 G/17G
1 POWDER, FOR SOLUTION ORAL
Status: DISCONTINUED | OUTPATIENT
Start: 2022-01-01 | End: 2022-01-01

## 2022-01-01 RX ORDER — POTASSIUM CHLORIDE 20 MEQ/1
40 TABLET, EXTENDED RELEASE ORAL ONCE
Status: COMPLETED | OUTPATIENT
Start: 2022-01-01 | End: 2022-01-01

## 2022-01-01 RX ORDER — ACETYLCYSTEINE 200 MG/ML
3 SOLUTION ORAL; RESPIRATORY (INHALATION)
Status: DISCONTINUED | OUTPATIENT
Start: 2022-01-01 | End: 2022-01-01

## 2022-01-01 RX ORDER — MIDODRINE HYDROCHLORIDE 5 MG/1
15 TABLET ORAL
Status: DISCONTINUED | OUTPATIENT
Start: 2022-01-01 | End: 2022-01-01

## 2022-01-01 RX ORDER — LORAZEPAM 2 MG/ML
0.5 INJECTION INTRAMUSCULAR EVERY 4 HOURS PRN
Status: DISCONTINUED | OUTPATIENT
Start: 2022-01-01 | End: 2022-01-01

## 2022-01-01 RX ORDER — OMEPRAZOLE 20 MG/1
40 CAPSULE, DELAYED RELEASE ORAL DAILY
Status: DISCONTINUED | OUTPATIENT
Start: 2022-01-01 | End: 2022-01-01

## 2022-01-01 RX ORDER — ONDANSETRON 4 MG/1
4 TABLET, ORALLY DISINTEGRATING ORAL EVERY 4 HOURS PRN
Status: DISCONTINUED | OUTPATIENT
Start: 2022-01-01 | End: 2022-01-01

## 2022-01-01 RX ORDER — SODIUM CHLORIDE, SODIUM LACTATE, POTASSIUM CHLORIDE, CALCIUM CHLORIDE 600; 310; 30; 20 MG/100ML; MG/100ML; MG/100ML; MG/100ML
INJECTION, SOLUTION INTRAVENOUS
Status: DISCONTINUED | OUTPATIENT
Start: 2022-01-01 | End: 2022-01-01 | Stop reason: SURG

## 2022-01-01 RX ORDER — ACETAMINOPHEN 325 MG/1
650 TABLET ORAL EVERY 6 HOURS PRN
Status: DISCONTINUED | OUTPATIENT
Start: 2022-01-01 | End: 2022-01-01

## 2022-01-01 RX ORDER — DOCUSATE CALCIUM 240 MG
240 CAPSULE ORAL 2 TIMES DAILY
Status: ON HOLD | COMMUNITY
End: 2022-01-01

## 2022-01-01 RX ORDER — PREGABALIN 75 MG/1
75 CAPSULE ORAL 2 TIMES DAILY
Status: DISCONTINUED | OUTPATIENT
Start: 2022-01-01 | End: 2022-01-01

## 2022-01-01 RX ORDER — CARVEDILOL 6.25 MG/1
6.25 TABLET ORAL 2 TIMES DAILY WITH MEALS
Status: DISCONTINUED | OUTPATIENT
Start: 2022-01-01 | End: 2022-01-01

## 2022-01-01 RX ORDER — TRAMADOL HYDROCHLORIDE 50 MG/1
50 TABLET ORAL EVERY 4 HOURS PRN
Status: DISCONTINUED | OUTPATIENT
Start: 2022-01-01 | End: 2022-01-01

## 2022-01-01 RX ORDER — DIGOXIN 0.25 MG/ML
250 INJECTION INTRAMUSCULAR; INTRAVENOUS ONCE
Status: COMPLETED | OUTPATIENT
Start: 2022-01-01 | End: 2022-01-01

## 2022-01-01 RX ORDER — FUROSEMIDE 10 MG/ML
40 INJECTION INTRAMUSCULAR; INTRAVENOUS
Status: DISCONTINUED | OUTPATIENT
Start: 2022-01-01 | End: 2022-01-01

## 2022-01-01 RX ORDER — HYDROCODONE BITARTRATE AND ACETAMINOPHEN 10; 325 MG/1; MG/1
1 TABLET ORAL EVERY 6 HOURS PRN
Status: DISCONTINUED | OUTPATIENT
Start: 2022-01-01 | End: 2022-01-01

## 2022-01-01 RX ORDER — OXYCODONE HCL 5 MG/5 ML
5 SOLUTION, ORAL ORAL
Status: DISCONTINUED | OUTPATIENT
Start: 2022-01-01 | End: 2022-01-01 | Stop reason: HOSPADM

## 2022-01-01 RX ORDER — HYDRALAZINE HYDROCHLORIDE 20 MG/ML
5 INJECTION INTRAMUSCULAR; INTRAVENOUS
Status: DISCONTINUED | OUTPATIENT
Start: 2022-01-01 | End: 2022-01-01 | Stop reason: HOSPADM

## 2022-01-01 RX ORDER — FUROSEMIDE 10 MG/ML
20 INJECTION INTRAMUSCULAR; INTRAVENOUS
Status: DISCONTINUED | OUTPATIENT
Start: 2022-01-01 | End: 2022-01-01

## 2022-01-01 RX ORDER — OXYCODONE AND ACETAMINOPHEN 10; 325 MG/1; MG/1
1 TABLET ORAL EVERY 4 HOURS PRN
COMMUNITY

## 2022-01-01 RX ORDER — FERROUS SULFATE 325(65) MG
325 TABLET ORAL DAILY
Status: DISCONTINUED | OUTPATIENT
Start: 2022-01-01 | End: 2022-01-01

## 2022-01-01 RX ORDER — HALOPERIDOL 2 MG/ML
5 SOLUTION ORAL EVERY 6 HOURS PRN
Status: DISCONTINUED | OUTPATIENT
Start: 2022-01-01 | End: 2022-11-03 | Stop reason: HOSPADM

## 2022-01-01 RX ORDER — KETAMINE HYDROCHLORIDE 50 MG/ML
INJECTION, SOLUTION INTRAMUSCULAR; INTRAVENOUS PRN
Status: DISCONTINUED | OUTPATIENT
Start: 2022-01-01 | End: 2022-01-01 | Stop reason: SURG

## 2022-01-01 RX ORDER — MIDODRINE HYDROCHLORIDE 5 MG/1
10 TABLET ORAL
Status: DISCONTINUED | OUTPATIENT
Start: 2022-01-01 | End: 2022-01-01

## 2022-01-01 RX ORDER — BISACODYL 10 MG
10 SUPPOSITORY, RECTAL RECTAL
Status: DISCONTINUED | OUTPATIENT
Start: 2022-01-01 | End: 2022-01-01

## 2022-01-01 RX ORDER — LIDOCAINE HYDROCHLORIDE 20 MG/ML
INJECTION, SOLUTION EPIDURAL; INFILTRATION; INTRACAUDAL; PERINEURAL PRN
Status: DISCONTINUED | OUTPATIENT
Start: 2022-01-01 | End: 2022-01-01 | Stop reason: SURG

## 2022-01-01 RX ORDER — ONDANSETRON 2 MG/ML
4 INJECTION INTRAMUSCULAR; INTRAVENOUS
Status: DISCONTINUED | OUTPATIENT
Start: 2022-01-01 | End: 2022-01-01 | Stop reason: HOSPADM

## 2022-01-01 RX ORDER — HEPARIN SODIUM 1000 [USP'U]/ML
80 INJECTION, SOLUTION INTRAVENOUS; SUBCUTANEOUS ONCE
Status: COMPLETED | OUTPATIENT
Start: 2022-01-01 | End: 2022-01-01

## 2022-01-01 RX ORDER — ONDANSETRON 4 MG/1
8 TABLET, ORALLY DISINTEGRATING ORAL EVERY 4 HOURS PRN
Status: DISCONTINUED | OUTPATIENT
Start: 2022-01-01 | End: 2022-11-03 | Stop reason: HOSPADM

## 2022-01-01 RX ORDER — DEXTROSE MONOHYDRATE 25 G/50ML
25 INJECTION, SOLUTION INTRAVENOUS
Status: DISCONTINUED | OUTPATIENT
Start: 2022-01-01 | End: 2022-01-01

## 2022-01-01 RX ORDER — CYCLOBENZAPRINE HCL 10 MG
10 TABLET ORAL 3 TIMES DAILY PRN
Status: ON HOLD | COMMUNITY
End: 2022-01-01

## 2022-01-01 RX ORDER — LIDOCAINE HYDROCHLORIDE AND EPINEPHRINE 10; 10 MG/ML; UG/ML
20 INJECTION, SOLUTION INFILTRATION; PERINEURAL ONCE
Status: DISPENSED | OUTPATIENT
Start: 2022-01-01 | End: 2022-01-01

## 2022-01-01 RX ORDER — DEXAMETHASONE SODIUM PHOSPHATE 4 MG/ML
INJECTION, SOLUTION INTRA-ARTICULAR; INTRALESIONAL; INTRAMUSCULAR; INTRAVENOUS; SOFT TISSUE PRN
Status: DISCONTINUED | OUTPATIENT
Start: 2022-01-01 | End: 2022-01-01 | Stop reason: SURG

## 2022-01-01 RX ORDER — HYDROMORPHONE HYDROCHLORIDE 1 MG/ML
0.5 INJECTION, SOLUTION INTRAMUSCULAR; INTRAVENOUS; SUBCUTANEOUS
Status: DISCONTINUED | OUTPATIENT
Start: 2022-01-01 | End: 2022-01-01

## 2022-01-01 RX ORDER — METOPROLOL TARTRATE 1 MG/ML
5 INJECTION, SOLUTION INTRAVENOUS ONCE
Status: COMPLETED | OUTPATIENT
Start: 2022-01-01 | End: 2022-01-01

## 2022-01-01 RX ORDER — MIDODRINE HYDROCHLORIDE 5 MG/1
2.5 TABLET ORAL
Status: COMPLETED | OUTPATIENT
Start: 2022-01-01 | End: 2022-01-01

## 2022-01-01 RX ORDER — OXYCODONE HCL 5 MG/5 ML
10 SOLUTION, ORAL ORAL
Status: DISCONTINUED | OUTPATIENT
Start: 2022-01-01 | End: 2022-01-01 | Stop reason: HOSPADM

## 2022-01-01 RX ORDER — CHOLECALCIFEROL (VITAMIN D3) 125 MCG
500 CAPSULE ORAL DAILY
COMMUNITY

## 2022-01-01 RX ORDER — DIGOXIN 125 MCG
250 TABLET ORAL DAILY
Status: DISCONTINUED | OUTPATIENT
Start: 2022-01-01 | End: 2022-01-01

## 2022-01-01 RX ORDER — PREDNISONE 20 MG/1
40 TABLET ORAL DAILY
Status: DISPENSED | OUTPATIENT
Start: 2022-01-01 | End: 2022-01-01

## 2022-01-01 RX ORDER — METOPROLOL TARTRATE 1 MG/ML
5 INJECTION, SOLUTION INTRAVENOUS
Status: DISCONTINUED | OUTPATIENT
Start: 2022-01-01 | End: 2022-01-01

## 2022-01-01 RX ORDER — IPRATROPIUM BROMIDE AND ALBUTEROL SULFATE 2.5; .5 MG/3ML; MG/3ML
3 SOLUTION RESPIRATORY (INHALATION) 4 TIMES DAILY
Status: DISCONTINUED | OUTPATIENT
Start: 2022-01-01 | End: 2022-01-01

## 2022-01-01 RX ORDER — SODIUM CHLORIDE, SODIUM LACTATE, POTASSIUM CHLORIDE, CALCIUM CHLORIDE 600; 310; 30; 20 MG/100ML; MG/100ML; MG/100ML; MG/100ML
INJECTION, SOLUTION INTRAVENOUS CONTINUOUS
Status: DISCONTINUED | OUTPATIENT
Start: 2022-01-01 | End: 2022-01-01 | Stop reason: HOSPADM

## 2022-01-01 RX ORDER — MIDODRINE HYDROCHLORIDE 5 MG/1
5 TABLET ORAL
Status: DISCONTINUED | OUTPATIENT
Start: 2022-01-01 | End: 2022-01-01

## 2022-01-01 RX ORDER — ENEMA 19; 7 G/133ML; G/133ML
1 ENEMA RECTAL ONCE
Status: COMPLETED | OUTPATIENT
Start: 2022-01-01 | End: 2022-01-01

## 2022-01-01 RX ORDER — LINEZOLID 600 MG/1
600 TABLET, FILM COATED ORAL EVERY 12 HOURS
Status: DISCONTINUED | OUTPATIENT
Start: 2022-01-01 | End: 2022-01-01

## 2022-01-01 RX ORDER — FUROSEMIDE 40 MG/1
60 TABLET ORAL DAILY
COMMUNITY

## 2022-01-01 RX ORDER — AMIODARONE HYDROCHLORIDE 200 MG/1
400 TABLET ORAL TWICE DAILY
Status: DISCONTINUED | OUTPATIENT
Start: 2022-01-01 | End: 2022-01-01

## 2022-01-01 RX ORDER — ATROPINE SULFATE 10 MG/ML
2 SOLUTION/ DROPS OPHTHALMIC EVERY 4 HOURS PRN
Status: DISCONTINUED | OUTPATIENT
Start: 2022-01-01 | End: 2022-11-03 | Stop reason: HOSPADM

## 2022-01-01 RX ORDER — ACETAMINOPHEN 325 MG/1
650 TABLET ORAL EVERY 4 HOURS PRN
Status: DISCONTINUED | OUTPATIENT
Start: 2022-01-01 | End: 2022-11-03 | Stop reason: HOSPADM

## 2022-01-01 RX ORDER — SODIUM CHLORIDE, SODIUM LACTATE, POTASSIUM CHLORIDE, AND CALCIUM CHLORIDE .6; .31; .03; .02 G/100ML; G/100ML; G/100ML; G/100ML
500 INJECTION, SOLUTION INTRAVENOUS
Status: DISCONTINUED | OUTPATIENT
Start: 2022-01-01 | End: 2022-01-01

## 2022-01-01 RX ORDER — NOREPINEPHRINE BITARTRATE 0.03 MG/ML
0-30 INJECTION, SOLUTION INTRAVENOUS CONTINUOUS
Status: DISCONTINUED | OUTPATIENT
Start: 2022-01-01 | End: 2022-01-01

## 2022-01-01 RX ORDER — CLOTRIMAZOLE 1 %
1 CREAM (GRAM) TOPICAL 2 TIMES DAILY
Status: ON HOLD | COMMUNITY
End: 2022-01-01

## 2022-01-01 RX ORDER — CARVEDILOL 25 MG/1
25 TABLET ORAL 2 TIMES DAILY WITH MEALS
Status: DISCONTINUED | OUTPATIENT
Start: 2022-01-01 | End: 2022-01-01

## 2022-01-01 RX ORDER — OXYCODONE AND ACETAMINOPHEN 10; 325 MG/1; MG/1
1 TABLET ORAL EVERY 4 HOURS PRN
Status: DISCONTINUED | OUTPATIENT
Start: 2022-01-01 | End: 2022-01-01

## 2022-01-01 RX ORDER — DIGOXIN 125 MCG
125 TABLET ORAL DAILY
Status: COMPLETED | OUTPATIENT
Start: 2022-01-01 | End: 2022-01-01

## 2022-01-01 RX ORDER — PREGABALIN 25 MG/1
25 CAPSULE ORAL 2 TIMES DAILY
COMMUNITY

## 2022-01-01 RX ORDER — OXYCODONE HYDROCHLORIDE 5 MG/1
5 TABLET ORAL EVERY 4 HOURS PRN
Status: DISCONTINUED | OUTPATIENT
Start: 2022-01-01 | End: 2022-01-01

## 2022-01-01 RX ORDER — ONDANSETRON 2 MG/ML
4 INJECTION INTRAMUSCULAR; INTRAVENOUS EVERY 4 HOURS PRN
Status: DISCONTINUED | OUTPATIENT
Start: 2022-01-01 | End: 2022-01-01

## 2022-01-01 RX ORDER — VITAMIN B COMPLEX
1000 TABLET ORAL DAILY
COMMUNITY

## 2022-01-01 RX ORDER — OXYCODONE HYDROCHLORIDE 10 MG/1
10 TABLET ORAL EVERY 4 HOURS PRN
Status: DISCONTINUED | OUTPATIENT
Start: 2022-01-01 | End: 2022-01-01

## 2022-01-01 RX ADMIN — AMPICILLIN AND SULBACTAM 3 G: 1; 2 INJECTION, POWDER, FOR SOLUTION INTRAMUSCULAR; INTRAVENOUS at 00:10

## 2022-01-01 RX ADMIN — AMPICILLIN AND SULBACTAM 3 G: 1; 2 INJECTION, POWDER, FOR SOLUTION INTRAMUSCULAR; INTRAVENOUS at 11:33

## 2022-01-01 RX ADMIN — HYDROMORPHONE HYDROCHLORIDE 1 MG: 1 INJECTION, SOLUTION INTRAMUSCULAR; INTRAVENOUS; SUBCUTANEOUS at 10:01

## 2022-01-01 RX ADMIN — TIOTROPIUM BROMIDE INHALATION SPRAY 5 MCG: 3.12 SPRAY, METERED RESPIRATORY (INHALATION) at 07:39

## 2022-01-01 RX ADMIN — AMPICILLIN AND SULBACTAM 3 G: 1; 2 INJECTION, POWDER, FOR SOLUTION INTRAMUSCULAR; INTRAVENOUS at 05:43

## 2022-01-01 RX ADMIN — HYDROMORPHONE HYDROCHLORIDE 1 MG: 1 INJECTION, SOLUTION INTRAMUSCULAR; INTRAVENOUS; SUBCUTANEOUS at 08:42

## 2022-01-01 RX ADMIN — AMPICILLIN AND SULBACTAM 3 G: 1; 2 INJECTION, POWDER, FOR SOLUTION INTRAMUSCULAR; INTRAVENOUS at 05:05

## 2022-01-01 RX ADMIN — ACETAMINOPHEN 650 MG: 325 TABLET, FILM COATED ORAL at 11:26

## 2022-01-01 RX ADMIN — INSULIN HUMAN 1 UNITS: 100 INJECTION, SOLUTION PARENTERAL at 07:21

## 2022-01-01 RX ADMIN — TRAZODONE HYDROCHLORIDE 100 MG: 100 TABLET ORAL at 22:15

## 2022-01-01 RX ADMIN — NOREPINEPHRINE BITARTRATE 17 MCG/MIN: 1 INJECTION, SOLUTION, CONCENTRATE INTRAVENOUS at 19:54

## 2022-01-01 RX ADMIN — INSULIN HUMAN 2 UNITS: 100 INJECTION, SOLUTION PARENTERAL at 12:13

## 2022-01-01 RX ADMIN — PREGABALIN 25 MG: 25 CAPSULE ORAL at 17:43

## 2022-01-01 RX ADMIN — MIDODRINE HYDROCHLORIDE 15 MG: 5 TABLET ORAL at 07:47

## 2022-01-01 RX ADMIN — HYDROCORTISONE SODIUM SUCCINATE 50 MG: 100 INJECTION, POWDER, FOR SOLUTION INTRAMUSCULAR; INTRAVENOUS at 18:28

## 2022-01-01 RX ADMIN — CEFAZOLIN 2 G: 2 INJECTION, POWDER, FOR SOLUTION INTRAMUSCULAR; INTRAVENOUS at 19:13

## 2022-01-01 RX ADMIN — METHOCARBAMOL 500 MG: 500 TABLET ORAL at 22:15

## 2022-01-01 RX ADMIN — POLYETHYLENE GLYCOL 3350 1 PACKET: 17 POWDER, FOR SOLUTION ORAL at 05:33

## 2022-01-01 RX ADMIN — AMIODARONE HYDROCHLORIDE 400 MG: 200 TABLET ORAL at 05:32

## 2022-01-01 RX ADMIN — DIGOXIN 250 MCG: 0.12 TABLET ORAL at 18:32

## 2022-01-01 RX ADMIN — INSULIN HUMAN 2 UNITS: 100 INJECTION, SOLUTION PARENTERAL at 11:32

## 2022-01-01 RX ADMIN — NYSTATIN 1000000 UNITS: 100000 SUSPENSION ORAL at 08:42

## 2022-01-01 RX ADMIN — AMPICILLIN AND SULBACTAM 3 G: 1; 2 INJECTION, POWDER, FOR SOLUTION INTRAMUSCULAR; INTRAVENOUS at 05:41

## 2022-01-01 RX ADMIN — INSULIN HUMAN 1 UNITS: 100 INJECTION, SOLUTION PARENTERAL at 08:07

## 2022-01-01 RX ADMIN — AMPICILLIN AND SULBACTAM 3 G: 1; 2 INJECTION, POWDER, FOR SOLUTION INTRAMUSCULAR; INTRAVENOUS at 18:28

## 2022-01-01 RX ADMIN — IPRATROPIUM BROMIDE AND ALBUTEROL SULFATE 3 ML: .5; 3 SOLUTION RESPIRATORY (INHALATION) at 15:47

## 2022-01-01 RX ADMIN — NYSTATIN 1000000 UNITS: 100000 SUSPENSION ORAL at 20:56

## 2022-01-01 RX ADMIN — OXYCODONE HYDROCHLORIDE 10 MG: 10 TABLET ORAL at 02:21

## 2022-01-01 RX ADMIN — AMPICILLIN AND SULBACTAM 3 G: 1; 2 INJECTION, POWDER, FOR SOLUTION INTRAMUSCULAR; INTRAVENOUS at 11:27

## 2022-01-01 RX ADMIN — INSULIN HUMAN 1 UNITS: 100 INJECTION, SOLUTION PARENTERAL at 17:00

## 2022-01-01 RX ADMIN — IPRATROPIUM BROMIDE AND ALBUTEROL SULFATE 3 ML: .5; 3 SOLUTION RESPIRATORY (INHALATION) at 02:21

## 2022-01-01 RX ADMIN — LIDOCAINE HYDROCHLORIDE 60 MG: 20 INJECTION, SOLUTION EPIDURAL; INFILTRATION; INTRACAUDAL at 13:12

## 2022-01-01 RX ADMIN — CEFEPIME 2 G: 2 INJECTION, POWDER, FOR SOLUTION INTRAVENOUS at 05:22

## 2022-01-01 RX ADMIN — AMIODARONE HYDROCHLORIDE 1 MG/MIN: 1.8 INJECTION, SOLUTION INTRAVENOUS at 15:49

## 2022-01-01 RX ADMIN — HYDROCORTISONE SODIUM SUCCINATE 50 MG: 100 INJECTION, POWDER, FOR SOLUTION INTRAMUSCULAR; INTRAVENOUS at 06:09

## 2022-01-01 RX ADMIN — SODIUM CHLORIDE 1 G: 1 TABLET ORAL at 09:43

## 2022-01-01 RX ADMIN — OXYCODONE AND ACETAMINOPHEN 1 TABLET: 10; 325 TABLET ORAL at 04:55

## 2022-01-01 RX ADMIN — HEPARIN SODIUM 18 UNITS/KG/HR: 5000 INJECTION, SOLUTION INTRAVENOUS at 17:00

## 2022-01-01 RX ADMIN — SODIUM CHLORIDE 2 G: 1 TABLET ORAL at 05:16

## 2022-01-01 RX ADMIN — Medication 1 APPLICATOR: at 05:15

## 2022-01-01 RX ADMIN — FERROUS SULFATE TAB 325 MG (65 MG ELEMENTAL FE) 325 MG: 325 (65 FE) TAB at 05:31

## 2022-01-01 RX ADMIN — IPRATROPIUM BROMIDE AND ALBUTEROL SULFATE 3 ML: .5; 3 SOLUTION RESPIRATORY (INHALATION) at 15:13

## 2022-01-01 RX ADMIN — SENNOSIDES AND DOCUSATE SODIUM 2 TABLET: 50; 8.6 TABLET ORAL at 05:32

## 2022-01-01 RX ADMIN — OXYCODONE AND ACETAMINOPHEN 1 TABLET: 10; 325 TABLET ORAL at 21:03

## 2022-01-01 RX ADMIN — SODIUM CHLORIDE 1 G: 1 TABLET ORAL at 11:30

## 2022-01-01 RX ADMIN — OMEPRAZOLE 40 MG: 20 CAPSULE, DELAYED RELEASE ORAL at 05:32

## 2022-01-01 RX ADMIN — OMEPRAZOLE 40 MG: 20 CAPSULE, DELAYED RELEASE ORAL at 05:42

## 2022-01-01 RX ADMIN — NYSTATIN 1000000 UNITS: 100000 SUSPENSION ORAL at 13:16

## 2022-01-01 RX ADMIN — ONDANSETRON 4 MG: 2 INJECTION INTRAMUSCULAR; INTRAVENOUS at 14:28

## 2022-01-01 RX ADMIN — NOREPINEPHRINE BITARTRATE 12 MCG/MIN: 1 INJECTION, SOLUTION, CONCENTRATE INTRAVENOUS at 07:48

## 2022-01-01 RX ADMIN — INSULIN HUMAN 2 UNITS: 100 INJECTION, SOLUTION PARENTERAL at 20:20

## 2022-01-01 RX ADMIN — INSULIN HUMAN 3 UNITS: 100 INJECTION, SOLUTION PARENTERAL at 07:41

## 2022-01-01 RX ADMIN — SODIUM CHLORIDE 1 G: 1 TABLET ORAL at 16:51

## 2022-01-01 RX ADMIN — OXYCODONE AND ACETAMINOPHEN 1 TABLET: 10; 325 TABLET ORAL at 22:48

## 2022-01-01 RX ADMIN — ATORVASTATIN CALCIUM 40 MG: 40 TABLET, FILM COATED ORAL at 17:55

## 2022-01-01 RX ADMIN — FERROUS SULFATE TAB 325 MG (65 MG ELEMENTAL FE) 325 MG: 325 (65 FE) TAB at 04:54

## 2022-01-01 RX ADMIN — HYDROMORPHONE HYDROCHLORIDE 0.5 MG: 1 INJECTION, SOLUTION INTRAMUSCULAR; INTRAVENOUS; SUBCUTANEOUS at 12:44

## 2022-01-01 RX ADMIN — DEXAMETHASONE SODIUM PHOSPHATE 8 MG: 4 INJECTION, SOLUTION INTRA-ARTICULAR; INTRALESIONAL; INTRAMUSCULAR; INTRAVENOUS; SOFT TISSUE at 13:16

## 2022-01-01 RX ADMIN — FUROSEMIDE 40 MG: 10 INJECTION, SOLUTION INTRAMUSCULAR; INTRAVENOUS at 04:53

## 2022-01-01 RX ADMIN — FERROUS SULFATE TAB 325 MG (65 MG ELEMENTAL FE) 325 MG: 325 (65 FE) TAB at 05:18

## 2022-01-01 RX ADMIN — HEPARIN SODIUM 16 UNITS/KG/HR: 5000 INJECTION, SOLUTION INTRAVENOUS at 13:24

## 2022-01-01 RX ADMIN — NOREPINEPHRINE BITARTRATE 6 MCG/MIN: 1 INJECTION, SOLUTION, CONCENTRATE INTRAVENOUS at 14:51

## 2022-01-01 RX ADMIN — NYSTATIN 1000000 UNITS: 100000 SUSPENSION ORAL at 08:51

## 2022-01-01 RX ADMIN — IPRATROPIUM BROMIDE AND ALBUTEROL SULFATE 3 ML: .5; 3 SOLUTION RESPIRATORY (INHALATION) at 06:24

## 2022-01-01 RX ADMIN — ATORVASTATIN CALCIUM 40 MG: 40 TABLET, FILM COATED ORAL at 17:32

## 2022-01-01 RX ADMIN — AMIODARONE HYDROCHLORIDE 400 MG: 200 TABLET ORAL at 17:56

## 2022-01-01 RX ADMIN — NYSTATIN 1000000 UNITS: 100000 SUSPENSION ORAL at 12:15

## 2022-01-01 RX ADMIN — NYSTATIN 1000000 UNITS: 100000 SUSPENSION ORAL at 08:17

## 2022-01-01 RX ADMIN — NYSTATIN 1000000 UNITS: 100000 SUSPENSION ORAL at 20:09

## 2022-01-01 RX ADMIN — ATORVASTATIN CALCIUM 40 MG: 40 TABLET, FILM COATED ORAL at 17:54

## 2022-01-01 RX ADMIN — AMPICILLIN AND SULBACTAM 3 G: 1; 2 INJECTION, POWDER, FOR SOLUTION INTRAMUSCULAR; INTRAVENOUS at 00:00

## 2022-01-01 RX ADMIN — MIDAZOLAM HYDROCHLORIDE 2 MG: 1 INJECTION, SOLUTION INTRAMUSCULAR; INTRAVENOUS at 13:07

## 2022-01-01 RX ADMIN — TRAZODONE HYDROCHLORIDE 100 MG: 100 TABLET ORAL at 20:56

## 2022-01-01 RX ADMIN — MAGNESIUM SULFATE HEPTAHYDRATE 2 G: 40 INJECTION, SOLUTION INTRAVENOUS at 15:50

## 2022-01-01 RX ADMIN — MIDODRINE HYDROCHLORIDE 15 MG: 5 TABLET ORAL at 18:29

## 2022-01-01 RX ADMIN — SODIUM PHOSPHATE 133 ML: 7; 19 ENEMA RECTAL at 13:07

## 2022-01-01 RX ADMIN — MIDODRINE HYDROCHLORIDE 5 MG: 5 TABLET ORAL at 18:19

## 2022-01-01 RX ADMIN — PREGABALIN 75 MG: 75 CAPSULE ORAL at 18:29

## 2022-01-01 RX ADMIN — IPRATROPIUM BROMIDE AND ALBUTEROL SULFATE 3 ML: .5; 2.5 SOLUTION RESPIRATORY (INHALATION) at 07:28

## 2022-01-01 RX ADMIN — SPIRONOLACTONE 25 MG: 25 TABLET ORAL at 06:00

## 2022-01-01 RX ADMIN — DIGOXIN 125 MCG: 0.25 INJECTION INTRAMUSCULAR; INTRAVENOUS at 18:28

## 2022-01-01 RX ADMIN — FUROSEMIDE 40 MG: 10 INJECTION, SOLUTION INTRAMUSCULAR; INTRAVENOUS at 15:31

## 2022-01-01 RX ADMIN — SODIUM PHOSPHATE, MONOBASIC, MONOHYDRATE AND SODIUM PHOSPHATE, DIBASIC, ANHYDROUS 15 MMOL: 276; 142 INJECTION, SOLUTION INTRAVENOUS at 11:27

## 2022-01-01 RX ADMIN — INSULIN HUMAN 3 UNITS: 100 INJECTION, SOLUTION PARENTERAL at 18:30

## 2022-01-01 RX ADMIN — SENNOSIDES AND DOCUSATE SODIUM 2 TABLET: 50; 8.6 TABLET ORAL at 17:34

## 2022-01-01 RX ADMIN — HYDROCORTISONE SODIUM SUCCINATE 50 MG: 100 INJECTION, POWDER, FOR SOLUTION INTRAMUSCULAR; INTRAVENOUS at 00:23

## 2022-01-01 RX ADMIN — CELECOXIB 100 MG: 100 CAPSULE ORAL at 05:31

## 2022-01-01 RX ADMIN — OXYCODONE AND ACETAMINOPHEN 1 TABLET: 10; 325 TABLET ORAL at 16:50

## 2022-01-01 RX ADMIN — HEPARIN SODIUM 15 UNITS/KG/HR: 5000 INJECTION, SOLUTION INTRAVENOUS at 01:42

## 2022-01-01 RX ADMIN — SODIUM CHLORIDE 2 G: 1 TABLET ORAL at 07:13

## 2022-01-01 RX ADMIN — SODIUM CHLORIDE 2 G: 1 TABLET ORAL at 07:47

## 2022-01-01 RX ADMIN — TRAZODONE HYDROCHLORIDE 100 MG: 100 TABLET ORAL at 21:14

## 2022-01-01 RX ADMIN — SENNOSIDES AND DOCUSATE SODIUM 2 TABLET: 50; 8.6 TABLET ORAL at 05:22

## 2022-01-01 RX ADMIN — IPRATROPIUM BROMIDE AND ALBUTEROL SULFATE 3 ML: .5; 2.5 SOLUTION RESPIRATORY (INHALATION) at 07:09

## 2022-01-01 RX ADMIN — FUROSEMIDE 40 MG: 10 INJECTION, SOLUTION INTRAMUSCULAR; INTRAVENOUS at 16:19

## 2022-01-01 RX ADMIN — NYSTATIN 1000000 UNITS: 100000 SUSPENSION ORAL at 12:38

## 2022-01-01 RX ADMIN — FUROSEMIDE 80 MG: 10 INJECTION, SOLUTION INTRAMUSCULAR; INTRAVENOUS at 05:33

## 2022-01-01 RX ADMIN — INSULIN HUMAN 1 UNITS: 100 INJECTION, SOLUTION PARENTERAL at 05:19

## 2022-01-01 RX ADMIN — AMIODARONE HYDROCHLORIDE 400 MG: 200 TABLET ORAL at 17:42

## 2022-01-01 RX ADMIN — IPRATROPIUM BROMIDE AND ALBUTEROL SULFATE 3 ML: .5; 3 SOLUTION RESPIRATORY (INHALATION) at 02:09

## 2022-01-01 RX ADMIN — INSULIN HUMAN 1 UNITS: 100 INJECTION, SOLUTION PARENTERAL at 06:34

## 2022-01-01 RX ADMIN — IPRATROPIUM BROMIDE AND ALBUTEROL SULFATE 3 ML: .5; 2.5 SOLUTION RESPIRATORY (INHALATION) at 19:16

## 2022-01-01 RX ADMIN — FUROSEMIDE 80 MG: 10 INJECTION, SOLUTION INTRAMUSCULAR; INTRAVENOUS at 16:02

## 2022-01-01 RX ADMIN — HYDROMORPHONE HYDROCHLORIDE 0.5 MG: 1 INJECTION, SOLUTION INTRAMUSCULAR; INTRAVENOUS; SUBCUTANEOUS at 10:13

## 2022-01-01 RX ADMIN — PREGABALIN 25 MG: 25 CAPSULE ORAL at 18:19

## 2022-01-01 RX ADMIN — DIGOXIN 250 MCG: 0.25 INJECTION INTRAMUSCULAR; INTRAVENOUS at 08:34

## 2022-01-01 RX ADMIN — INSULIN HUMAN 2 UNITS: 100 INJECTION, SOLUTION PARENTERAL at 20:51

## 2022-01-01 RX ADMIN — IPRATROPIUM BROMIDE AND ALBUTEROL SULFATE 3 ML: .5; 2.5 SOLUTION RESPIRATORY (INHALATION) at 11:38

## 2022-01-01 RX ADMIN — POTASSIUM CHLORIDE 40 MEQ: 1500 TABLET, EXTENDED RELEASE ORAL at 17:16

## 2022-01-01 RX ADMIN — FERROUS SULFATE TAB 325 MG (65 MG ELEMENTAL FE) 325 MG: 325 (65 FE) TAB at 04:58

## 2022-01-01 RX ADMIN — OMEPRAZOLE 40 MG: 20 CAPSULE, DELAYED RELEASE ORAL at 04:53

## 2022-01-01 RX ADMIN — PREGABALIN 25 MG: 25 CAPSULE ORAL at 05:42

## 2022-01-01 RX ADMIN — INSULIN HUMAN 2 UNITS: 100 INJECTION, SOLUTION PARENTERAL at 07:41

## 2022-01-01 RX ADMIN — HYDROCORTISONE SODIUM SUCCINATE 50 MG: 100 INJECTION, POWDER, FOR SOLUTION INTRAMUSCULAR; INTRAVENOUS at 11:25

## 2022-01-01 RX ADMIN — FUROSEMIDE 20 MG: 10 INJECTION, SOLUTION INTRAMUSCULAR; INTRAVENOUS at 13:11

## 2022-01-01 RX ADMIN — AMIODARONE HYDROCHLORIDE 400 MG: 200 TABLET ORAL at 17:54

## 2022-01-01 RX ADMIN — METHOCARBAMOL 500 MG: 500 TABLET ORAL at 12:16

## 2022-01-01 RX ADMIN — AMIODARONE HYDROCHLORIDE 400 MG: 200 TABLET ORAL at 05:42

## 2022-01-01 RX ADMIN — SENNOSIDES AND DOCUSATE SODIUM 2 TABLET: 50; 8.6 TABLET ORAL at 17:54

## 2022-01-01 RX ADMIN — METHOCARBAMOL 500 MG: 500 TABLET ORAL at 16:56

## 2022-01-01 RX ADMIN — SODIUM CHLORIDE 2 G: 1 TABLET ORAL at 18:30

## 2022-01-01 RX ADMIN — AMPICILLIN AND SULBACTAM 3 G: 1; 2 INJECTION, POWDER, FOR SOLUTION INTRAMUSCULAR; INTRAVENOUS at 05:18

## 2022-01-01 RX ADMIN — LORAZEPAM 0.5 MG: 2 INJECTION INTRAMUSCULAR; INTRAVENOUS at 10:09

## 2022-01-01 RX ADMIN — ACETAMINOPHEN 650 MG: 325 TABLET, FILM COATED ORAL at 18:29

## 2022-01-01 RX ADMIN — SODIUM CHLORIDE, POTASSIUM CHLORIDE, SODIUM LACTATE AND CALCIUM CHLORIDE 500 ML: 600; 310; 30; 20 INJECTION, SOLUTION INTRAVENOUS at 22:37

## 2022-01-01 RX ADMIN — CARVEDILOL 6.25 MG: 6.25 TABLET, FILM COATED ORAL at 05:27

## 2022-01-01 RX ADMIN — SODIUM CHLORIDE 2 G: 1 TABLET ORAL at 07:30

## 2022-01-01 RX ADMIN — NYSTATIN 1000000 UNITS: 100000 SUSPENSION ORAL at 16:51

## 2022-01-01 RX ADMIN — ATROPINE SULFATE 2 DROP: 10 SOLUTION/ DROPS OPHTHALMIC at 19:33

## 2022-01-01 RX ADMIN — IPRATROPIUM BROMIDE AND ALBUTEROL SULFATE 3 ML: .5; 3 SOLUTION RESPIRATORY (INHALATION) at 08:31

## 2022-01-01 RX ADMIN — MIDODRINE HYDROCHLORIDE 2.5 MG: 5 TABLET ORAL at 17:55

## 2022-01-01 RX ADMIN — NYSTATIN 1000000 UNITS: 100000 SUSPENSION ORAL at 20:37

## 2022-01-01 RX ADMIN — NYSTATIN 1000000 UNITS: 100000 SUSPENSION ORAL at 16:20

## 2022-01-01 RX ADMIN — PREGABALIN 25 MG: 25 CAPSULE ORAL at 05:17

## 2022-01-01 RX ADMIN — AMPICILLIN AND SULBACTAM 3 G: 1; 2 INJECTION, POWDER, FOR SOLUTION INTRAMUSCULAR; INTRAVENOUS at 17:55

## 2022-01-01 RX ADMIN — CARVEDILOL 6.25 MG: 6.25 TABLET, FILM COATED ORAL at 17:56

## 2022-01-01 RX ADMIN — ACETAMINOPHEN 650 MG: 325 TABLET, FILM COATED ORAL at 14:26

## 2022-01-01 RX ADMIN — VANCOMYCIN HYDROCHLORIDE 1250 MG: 500 INJECTION, POWDER, LYOPHILIZED, FOR SOLUTION INTRAVENOUS at 14:02

## 2022-01-01 RX ADMIN — OXYCODONE AND ACETAMINOPHEN 1 TABLET: 10; 325 TABLET ORAL at 07:47

## 2022-01-01 RX ADMIN — CEFEPIME 2 G: 2 INJECTION, POWDER, FOR SOLUTION INTRAVENOUS at 21:15

## 2022-01-01 RX ADMIN — INSULIN HUMAN 2 UNITS: 100 INJECTION, SOLUTION PARENTERAL at 18:20

## 2022-01-01 RX ADMIN — OXYCODONE HYDROCHLORIDE 10 MG: 10 TABLET ORAL at 08:18

## 2022-01-01 RX ADMIN — LORAZEPAM 2 MG: 2 TABLET ORAL at 13:10

## 2022-01-01 RX ADMIN — SENNOSIDES AND DOCUSATE SODIUM 2 TABLET: 50; 8.6 TABLET ORAL at 18:30

## 2022-01-01 RX ADMIN — NYSTATIN 1000000 UNITS: 100000 SUSPENSION ORAL at 17:32

## 2022-01-01 RX ADMIN — AMIODARONE HYDROCHLORIDE 400 MG: 200 TABLET ORAL at 05:21

## 2022-01-01 RX ADMIN — MIDODRINE HYDROCHLORIDE 5 MG: 5 TABLET ORAL at 12:14

## 2022-01-01 RX ADMIN — SENNOSIDES AND DOCUSATE SODIUM 2 TABLET: 50; 8.6 TABLET ORAL at 05:02

## 2022-01-01 RX ADMIN — CARVEDILOL 6.25 MG: 6.25 TABLET, FILM COATED ORAL at 18:33

## 2022-01-01 RX ADMIN — DIGOXIN 125 MCG: 0.25 INJECTION INTRAMUSCULAR; INTRAVENOUS at 20:57

## 2022-01-01 RX ADMIN — PREGABALIN 25 MG: 25 CAPSULE ORAL at 04:53

## 2022-01-01 RX ADMIN — OXYCODONE HYDROCHLORIDE 10 MG: 10 TABLET ORAL at 16:55

## 2022-01-01 RX ADMIN — SODIUM CHLORIDE, POTASSIUM CHLORIDE, SODIUM LACTATE AND CALCIUM CHLORIDE 500 ML: 600; 310; 30; 20 INJECTION, SOLUTION INTRAVENOUS at 07:13

## 2022-01-01 RX ADMIN — OXYCODONE AND ACETAMINOPHEN 1 TABLET: 10; 325 TABLET ORAL at 16:20

## 2022-01-01 RX ADMIN — ACETAMINOPHEN 650 MG: 325 TABLET, FILM COATED ORAL at 02:48

## 2022-01-01 RX ADMIN — HEPARIN SODIUM 18 UNITS/KG/HR: 5000 INJECTION, SOLUTION INTRAVENOUS at 04:33

## 2022-01-01 RX ADMIN — NYSTATIN 1000000 UNITS: 100000 SUSPENSION ORAL at 18:20

## 2022-01-01 RX ADMIN — MIDAZOLAM HYDROCHLORIDE 4 MG: 1 INJECTION, SOLUTION INTRAMUSCULAR; INTRAVENOUS at 19:33

## 2022-01-01 RX ADMIN — FERROUS SULFATE TAB 325 MG (65 MG ELEMENTAL FE) 325 MG: 325 (65 FE) TAB at 05:42

## 2022-01-01 RX ADMIN — SPIRONOLACTONE 25 MG: 25 TABLET ORAL at 04:57

## 2022-01-01 RX ADMIN — INSULIN HUMAN 3 UNITS: 100 INJECTION, SOLUTION PARENTERAL at 11:28

## 2022-01-01 RX ADMIN — FENTANYL CITRATE 100 MCG: 50 INJECTION, SOLUTION INTRAMUSCULAR; INTRAVENOUS at 11:40

## 2022-01-01 RX ADMIN — MIDODRINE HYDROCHLORIDE 2.5 MG: 5 TABLET ORAL at 08:40

## 2022-01-01 RX ADMIN — INSULIN HUMAN 3 UNITS: 100 INJECTION, SOLUTION PARENTERAL at 21:20

## 2022-01-01 RX ADMIN — OXYCODONE AND ACETAMINOPHEN 1 TABLET: 10; 325 TABLET ORAL at 08:40

## 2022-01-01 RX ADMIN — SPIRONOLACTONE 25 MG: 25 TABLET ORAL at 05:18

## 2022-01-01 RX ADMIN — SODIUM CHLORIDE 2 G: 1 TABLET ORAL at 17:34

## 2022-01-01 RX ADMIN — IPRATROPIUM BROMIDE AND ALBUTEROL SULFATE 3 ML: .5; 2.5 SOLUTION RESPIRATORY (INHALATION) at 11:34

## 2022-01-01 RX ADMIN — METHOCARBAMOL 500 MG: 500 TABLET ORAL at 11:27

## 2022-01-01 RX ADMIN — OXYCODONE AND ACETAMINOPHEN 1 TABLET: 10; 325 TABLET ORAL at 17:57

## 2022-01-01 RX ADMIN — INSULIN HUMAN 2 UNITS: 100 INJECTION, SOLUTION PARENTERAL at 17:32

## 2022-01-01 RX ADMIN — ATORVASTATIN CALCIUM 40 MG: 40 TABLET, FILM COATED ORAL at 17:42

## 2022-01-01 RX ADMIN — SODIUM CHLORIDE 2 G: 1 TABLET ORAL at 11:57

## 2022-01-01 RX ADMIN — PREGABALIN 25 MG: 25 CAPSULE ORAL at 04:55

## 2022-01-01 RX ADMIN — SENNOSIDES AND DOCUSATE SODIUM 2 TABLET: 50; 8.6 TABLET ORAL at 05:43

## 2022-01-01 RX ADMIN — SODIUM CHLORIDE 2 G: 1 TABLET ORAL at 08:18

## 2022-01-01 RX ADMIN — NYSTATIN 1000000 UNITS: 100000 SUSPENSION ORAL at 20:26

## 2022-01-01 RX ADMIN — AMPICILLIN AND SULBACTAM 3 G: 1; 2 INJECTION, POWDER, FOR SOLUTION INTRAMUSCULAR; INTRAVENOUS at 11:57

## 2022-01-01 RX ADMIN — SENNOSIDES AND DOCUSATE SODIUM 2 TABLET: 50; 8.6 TABLET ORAL at 05:17

## 2022-01-01 RX ADMIN — AMIODARONE HYDROCHLORIDE 1 MG/MIN: 1.8 INJECTION, SOLUTION INTRAVENOUS at 20:03

## 2022-01-01 RX ADMIN — PREGABALIN 25 MG: 25 CAPSULE ORAL at 05:22

## 2022-01-01 RX ADMIN — INSULIN HUMAN 3 UNITS: 100 INJECTION, SOLUTION PARENTERAL at 12:52

## 2022-01-01 RX ADMIN — PREGABALIN 25 MG: 25 CAPSULE ORAL at 05:47

## 2022-01-01 RX ADMIN — PREGABALIN 75 MG: 75 CAPSULE ORAL at 05:32

## 2022-01-01 RX ADMIN — LINEZOLID 600 MG: 600 TABLET, FILM COATED ORAL at 19:30

## 2022-01-01 RX ADMIN — HEPARIN SODIUM 18 UNITS/KG/HR: 5000 INJECTION, SOLUTION INTRAVENOUS at 13:46

## 2022-01-01 RX ADMIN — ACETAMINOPHEN 650 MG: 325 TABLET, FILM COATED ORAL at 22:15

## 2022-01-01 RX ADMIN — SODIUM CHLORIDE 2 G: 1 TABLET ORAL at 11:26

## 2022-01-01 RX ADMIN — SPIRONOLACTONE 25 MG: 25 TABLET ORAL at 05:42

## 2022-01-01 RX ADMIN — HYDROMORPHONE HYDROCHLORIDE 1 MG: 1 INJECTION, SOLUTION INTRAMUSCULAR; INTRAVENOUS; SUBCUTANEOUS at 22:10

## 2022-01-01 RX ADMIN — LORAZEPAM 0.5 MG: 2 INJECTION INTRAMUSCULAR; INTRAVENOUS at 01:44

## 2022-01-01 RX ADMIN — AMPICILLIN AND SULBACTAM 3 G: 1; 2 INJECTION, POWDER, FOR SOLUTION INTRAMUSCULAR; INTRAVENOUS at 12:15

## 2022-01-01 RX ADMIN — PREGABALIN 25 MG: 25 CAPSULE ORAL at 18:41

## 2022-01-01 RX ADMIN — NYSTATIN 1000000 UNITS: 100000 SUSPENSION ORAL at 08:34

## 2022-01-01 RX ADMIN — OXYCODONE HYDROCHLORIDE 10 MG: 10 TABLET ORAL at 20:10

## 2022-01-01 RX ADMIN — OXYCODONE AND ACETAMINOPHEN 1 TABLET: 10; 325 TABLET ORAL at 05:19

## 2022-01-01 RX ADMIN — OMEPRAZOLE 40 MG: 20 CAPSULE, DELAYED RELEASE ORAL at 04:58

## 2022-01-01 RX ADMIN — IPRATROPIUM BROMIDE AND ALBUTEROL SULFATE 3 ML: .5; 3 SOLUTION RESPIRATORY (INHALATION) at 22:18

## 2022-01-01 RX ADMIN — AMIODARONE HYDROCHLORIDE 150 MG: 1.5 INJECTION, SOLUTION INTRAVENOUS at 14:56

## 2022-01-01 RX ADMIN — DIGOXIN 125 MCG: 0.12 TABLET ORAL at 17:42

## 2022-01-01 RX ADMIN — SPIRONOLACTONE 25 MG: 25 TABLET ORAL at 04:53

## 2022-01-01 RX ADMIN — AMIODARONE HYDROCHLORIDE 400 MG: 200 TABLET ORAL at 18:29

## 2022-01-01 RX ADMIN — NYSTATIN 1000000 UNITS: 100000 SUSPENSION ORAL at 21:03

## 2022-01-01 RX ADMIN — PREGABALIN 25 MG: 25 CAPSULE ORAL at 17:56

## 2022-01-01 RX ADMIN — OXYCODONE AND ACETAMINOPHEN 1 TABLET: 10; 325 TABLET ORAL at 21:16

## 2022-01-01 RX ADMIN — AMPICILLIN AND SULBACTAM 3 G: 1; 2 INJECTION, POWDER, FOR SOLUTION INTRAMUSCULAR; INTRAVENOUS at 17:43

## 2022-01-01 RX ADMIN — MORPHINE SULFATE 10 MG: 10 INJECTION INTRAVENOUS at 18:52

## 2022-01-01 RX ADMIN — NYSTATIN 1000000 UNITS: 100000 SUSPENSION ORAL at 13:46

## 2022-01-01 RX ADMIN — OXYCODONE AND ACETAMINOPHEN 1 TABLET: 10; 325 TABLET ORAL at 11:56

## 2022-01-01 RX ADMIN — NOREPINEPHRINE BITARTRATE 14 MCG/MIN: 1 INJECTION, SOLUTION, CONCENTRATE INTRAVENOUS at 13:23

## 2022-01-01 RX ADMIN — IOHEXOL 48 ML: 350 INJECTION, SOLUTION INTRAVENOUS at 13:32

## 2022-01-01 RX ADMIN — HEPARIN SODIUM 12 UNITS/KG/HR: 5000 INJECTION, SOLUTION INTRAVENOUS at 09:43

## 2022-01-01 RX ADMIN — LORAZEPAM 0.5 MG: 2 INJECTION INTRAMUSCULAR; INTRAVENOUS at 05:48

## 2022-01-01 RX ADMIN — AMPICILLIN AND SULBACTAM 3 G: 1; 2 INJECTION, POWDER, FOR SOLUTION INTRAMUSCULAR; INTRAVENOUS at 00:24

## 2022-01-01 RX ADMIN — FUROSEMIDE 80 MG: 10 INJECTION, SOLUTION INTRAMUSCULAR; INTRAVENOUS at 08:57

## 2022-01-01 RX ADMIN — ROCURONIUM BROMIDE 100 MG: 10 INJECTION, SOLUTION INTRAVENOUS at 13:13

## 2022-01-01 RX ADMIN — PREDNISONE 40 MG: 20 TABLET ORAL at 04:53

## 2022-01-01 RX ADMIN — TRAZODONE HYDROCHLORIDE 100 MG: 100 TABLET ORAL at 21:03

## 2022-01-01 RX ADMIN — NOREPINEPHRINE BITARTRATE 5 MCG/MIN: 1 INJECTION, SOLUTION, CONCENTRATE INTRAVENOUS at 23:00

## 2022-01-01 RX ADMIN — OXYCODONE AND ACETAMINOPHEN 1 TABLET: 10; 325 TABLET ORAL at 20:37

## 2022-01-01 RX ADMIN — FENTANYL CITRATE 50 MCG: 50 INJECTION, SOLUTION INTRAMUSCULAR; INTRAVENOUS at 13:12

## 2022-01-01 RX ADMIN — IPRATROPIUM BROMIDE AND ALBUTEROL SULFATE 3 ML: .5; 2.5 SOLUTION RESPIRATORY (INHALATION) at 15:49

## 2022-01-01 RX ADMIN — ATORVASTATIN CALCIUM 40 MG: 40 TABLET, FILM COATED ORAL at 18:29

## 2022-01-01 RX ADMIN — AMPICILLIN AND SULBACTAM 3 G: 1; 2 INJECTION, POWDER, FOR SOLUTION INTRAMUSCULAR; INTRAVENOUS at 11:12

## 2022-01-01 RX ADMIN — IPRATROPIUM BROMIDE AND ALBUTEROL SULFATE 3 ML: .5; 2.5 SOLUTION RESPIRATORY (INHALATION) at 18:20

## 2022-01-01 RX ADMIN — MIDODRINE HYDROCHLORIDE 2.5 MG: 5 TABLET ORAL at 11:07

## 2022-01-01 RX ADMIN — PREGABALIN 25 MG: 25 CAPSULE ORAL at 17:32

## 2022-01-01 RX ADMIN — NYSTATIN 1000000 UNITS: 100000 SUSPENSION ORAL at 20:54

## 2022-01-01 RX ADMIN — HYDROMORPHONE HYDROCHLORIDE 1 MG: 1 INJECTION, SOLUTION INTRAMUSCULAR; INTRAVENOUS; SUBCUTANEOUS at 00:10

## 2022-01-01 RX ADMIN — OXYCODONE HYDROCHLORIDE 10 MG: 10 TABLET ORAL at 16:13

## 2022-01-01 RX ADMIN — AMPICILLIN AND SULBACTAM 3 G: 1; 2 INJECTION, POWDER, FOR SOLUTION INTRAMUSCULAR; INTRAVENOUS at 23:24

## 2022-01-01 RX ADMIN — MIDODRINE HYDROCHLORIDE 15 MG: 5 TABLET ORAL at 11:26

## 2022-01-01 RX ADMIN — IPRATROPIUM BROMIDE AND ALBUTEROL SULFATE 3 ML: .5; 2.5 SOLUTION RESPIRATORY (INHALATION) at 04:49

## 2022-01-01 RX ADMIN — TIOTROPIUM BROMIDE INHALATION SPRAY 5 MCG: 3.12 SPRAY, METERED RESPIRATORY (INHALATION) at 07:41

## 2022-01-01 RX ADMIN — HEPARIN SODIUM 12 UNITS/KG/HR: 5000 INJECTION, SOLUTION INTRAVENOUS at 11:12

## 2022-01-01 RX ADMIN — IPRATROPIUM BROMIDE AND ALBUTEROL SULFATE 3 ML: .5; 3 SOLUTION RESPIRATORY (INHALATION) at 12:17

## 2022-01-01 RX ADMIN — PREDNISONE 40 MG: 20 TABLET ORAL at 05:21

## 2022-01-01 RX ADMIN — IPRATROPIUM BROMIDE AND ALBUTEROL SULFATE 3 ML: .5; 3 SOLUTION RESPIRATORY (INHALATION) at 19:13

## 2022-01-01 RX ADMIN — OMEPRAZOLE 40 MG: 20 CAPSULE, DELAYED RELEASE ORAL at 05:21

## 2022-01-01 RX ADMIN — IPRATROPIUM BROMIDE AND ALBUTEROL SULFATE 3 ML: .5; 2.5 SOLUTION RESPIRATORY (INHALATION) at 07:39

## 2022-01-01 RX ADMIN — SENNOSIDES AND DOCUSATE SODIUM 2 TABLET: 50; 8.6 TABLET ORAL at 04:53

## 2022-01-01 RX ADMIN — MIDODRINE HYDROCHLORIDE 15 MG: 5 TABLET ORAL at 12:17

## 2022-01-01 RX ADMIN — AMIODARONE HYDROCHLORIDE 400 MG: 200 TABLET ORAL at 04:11

## 2022-01-01 RX ADMIN — FUROSEMIDE 40 MG: 10 INJECTION, SOLUTION INTRAMUSCULAR; INTRAVENOUS at 05:22

## 2022-01-01 RX ADMIN — HYDROMORPHONE HYDROCHLORIDE 1 MG: 1 INJECTION, SOLUTION INTRAMUSCULAR; INTRAVENOUS; SUBCUTANEOUS at 17:55

## 2022-01-01 RX ADMIN — INSULIN HUMAN 2 UNITS: 100 INJECTION, SOLUTION PARENTERAL at 11:42

## 2022-01-01 RX ADMIN — OXYCODONE HYDROCHLORIDE 10 MG: 10 TABLET ORAL at 12:17

## 2022-01-01 RX ADMIN — AMPICILLIN AND SULBACTAM 3 G: 1; 2 INJECTION, POWDER, FOR SOLUTION INTRAMUSCULAR; INTRAVENOUS at 05:33

## 2022-01-01 RX ADMIN — INSULIN HUMAN 2 UNITS: 100 INJECTION, SOLUTION PARENTERAL at 17:57

## 2022-01-01 RX ADMIN — NYSTATIN 1000000 UNITS: 100000 SUSPENSION ORAL at 09:43

## 2022-01-01 RX ADMIN — LORAZEPAM 0.5 MG: 2 INJECTION INTRAMUSCULAR; INTRAVENOUS at 18:49

## 2022-01-01 RX ADMIN — SENNOSIDES AND DOCUSATE SODIUM 2 TABLET: 50; 8.6 TABLET ORAL at 17:42

## 2022-01-01 RX ADMIN — PREDNISONE 40 MG: 20 TABLET ORAL at 04:57

## 2022-01-01 RX ADMIN — OXYCODONE AND ACETAMINOPHEN 1 TABLET: 10; 325 TABLET ORAL at 04:53

## 2022-01-01 RX ADMIN — SENNOSIDES AND DOCUSATE SODIUM 2 TABLET: 50; 8.6 TABLET ORAL at 18:19

## 2022-01-01 RX ADMIN — AMPICILLIN AND SULBACTAM 3 G: 1; 2 INJECTION, POWDER, FOR SOLUTION INTRAMUSCULAR; INTRAVENOUS at 17:31

## 2022-01-01 RX ADMIN — DIGOXIN 125 MCG: 0.25 INJECTION INTRAMUSCULAR; INTRAVENOUS at 15:15

## 2022-01-01 RX ADMIN — INSULIN HUMAN 3 UNITS: 100 INJECTION, SOLUTION PARENTERAL at 17:55

## 2022-01-01 RX ADMIN — AMIODARONE HYDROCHLORIDE 400 MG: 200 TABLET ORAL at 04:55

## 2022-01-01 RX ADMIN — POLYETHYLENE GLYCOL 3350 1 PACKET: 17 POWDER, FOR SOLUTION ORAL at 05:03

## 2022-01-01 RX ADMIN — FERROUS SULFATE TAB 325 MG (65 MG ELEMENTAL FE) 325 MG: 325 (65 FE) TAB at 05:20

## 2022-01-01 RX ADMIN — INSULIN HUMAN 1 UNITS: 100 INJECTION, SOLUTION PARENTERAL at 07:59

## 2022-01-01 RX ADMIN — MIDODRINE HYDROCHLORIDE 5 MG: 5 TABLET ORAL at 05:21

## 2022-01-01 RX ADMIN — OXYCODONE AND ACETAMINOPHEN 1 TABLET: 10; 325 TABLET ORAL at 00:33

## 2022-01-01 RX ADMIN — METHOCARBAMOL 500 MG: 500 TABLET ORAL at 14:26

## 2022-01-01 RX ADMIN — OXYCODONE AND ACETAMINOPHEN 1 TABLET: 10; 325 TABLET ORAL at 07:13

## 2022-01-01 RX ADMIN — NYSTATIN 1000000 UNITS: 100000 SUSPENSION ORAL at 13:00

## 2022-01-01 RX ADMIN — NYSTATIN 1000000 UNITS: 100000 SUSPENSION ORAL at 18:44

## 2022-01-01 RX ADMIN — AMIODARONE HYDROCHLORIDE 400 MG: 200 TABLET ORAL at 04:53

## 2022-01-01 RX ADMIN — DIGOXIN 250 MCG: 0.12 TABLET ORAL at 17:55

## 2022-01-01 RX ADMIN — IPRATROPIUM BROMIDE AND ALBUTEROL SULFATE 3 ML: .5; 2.5 SOLUTION RESPIRATORY (INHALATION) at 13:09

## 2022-01-01 RX ADMIN — INSULIN HUMAN 1 UNITS: 100 INJECTION, SOLUTION PARENTERAL at 11:58

## 2022-01-01 RX ADMIN — IPRATROPIUM BROMIDE AND ALBUTEROL SULFATE 3 ML: .5; 2.5 SOLUTION RESPIRATORY (INHALATION) at 20:39

## 2022-01-01 RX ADMIN — OMEPRAZOLE 40 MG: 20 CAPSULE, DELAYED RELEASE ORAL at 05:18

## 2022-01-01 RX ADMIN — IPRATROPIUM BROMIDE AND ALBUTEROL SULFATE 3 ML: .5; 3 SOLUTION RESPIRATORY (INHALATION) at 11:53

## 2022-01-01 RX ADMIN — PREDNISONE 40 MG: 20 TABLET ORAL at 05:17

## 2022-01-01 RX ADMIN — HYDROMORPHONE HYDROCHLORIDE 1 MG: 1 INJECTION, SOLUTION INTRAMUSCULAR; INTRAVENOUS; SUBCUTANEOUS at 12:37

## 2022-01-01 RX ADMIN — SODIUM CHLORIDE, POTASSIUM CHLORIDE, SODIUM LACTATE AND CALCIUM CHLORIDE: 600; 310; 30; 20 INJECTION, SOLUTION INTRAVENOUS at 13:07

## 2022-01-01 RX ADMIN — KETAMINE HYDROCHLORIDE 50 MG: 50 INJECTION INTRAMUSCULAR; INTRAVENOUS at 13:12

## 2022-01-01 RX ADMIN — IPRATROPIUM BROMIDE AND ALBUTEROL SULFATE 3 ML: .5; 3 SOLUTION RESPIRATORY (INHALATION) at 19:00

## 2022-01-01 RX ADMIN — OXYCODONE AND ACETAMINOPHEN 1 TABLET: 10; 325 TABLET ORAL at 12:14

## 2022-01-01 RX ADMIN — EPHEDRINE SULFATE 10 MG: 50 INJECTION INTRAMUSCULAR; INTRAVENOUS; SUBCUTANEOUS at 14:39

## 2022-01-01 RX ADMIN — LORAZEPAM 0.5 MG: 2 INJECTION INTRAMUSCULAR; INTRAVENOUS at 17:43

## 2022-01-01 RX ADMIN — AMIODARONE HYDROCHLORIDE 400 MG: 200 TABLET ORAL at 18:32

## 2022-01-01 RX ADMIN — INSULIN HUMAN 2 UNITS: 100 INJECTION, SOLUTION PARENTERAL at 20:24

## 2022-01-01 RX ADMIN — INSULIN HUMAN 1 UNITS: 100 INJECTION, SOLUTION PARENTERAL at 21:15

## 2022-01-01 RX ADMIN — SODIUM CHLORIDE 2 G: 1 TABLET ORAL at 17:54

## 2022-01-01 RX ADMIN — AMIODARONE HYDROCHLORIDE 400 MG: 200 TABLET ORAL at 05:17

## 2022-01-01 RX ADMIN — INSULIN HUMAN 1 UNITS: 100 INJECTION, SOLUTION PARENTERAL at 21:16

## 2022-01-01 RX ADMIN — MIDODRINE HYDROCHLORIDE 15 MG: 5 TABLET ORAL at 08:17

## 2022-01-01 RX ADMIN — SUGAMMADEX 200 MG: 100 INJECTION, SOLUTION INTRAVENOUS at 15:05

## 2022-01-01 RX ADMIN — CEFEPIME 2 G: 2 INJECTION, POWDER, FOR SOLUTION INTRAVENOUS at 13:58

## 2022-01-01 RX ADMIN — HUMAN ALBUMIN MICROSPHERES AND PERFLUTREN 3 ML: 10; .22 INJECTION, SOLUTION INTRAVENOUS at 11:45

## 2022-01-01 RX ADMIN — OXYCODONE AND ACETAMINOPHEN 1 TABLET: 10; 325 TABLET ORAL at 05:22

## 2022-01-01 RX ADMIN — HYDROCORTISONE SODIUM SUCCINATE 50 MG: 100 INJECTION, POWDER, FOR SOLUTION INTRAMUSCULAR; INTRAVENOUS at 12:28

## 2022-01-01 RX ADMIN — PROPOFOL 100 MG: 10 INJECTION, EMULSION INTRAVENOUS at 13:12

## 2022-01-01 RX ADMIN — INSULIN HUMAN 2 UNITS: 100 INJECTION, SOLUTION PARENTERAL at 17:55

## 2022-01-01 RX ADMIN — NYSTATIN 1000000 UNITS: 100000 SUSPENSION ORAL at 14:26

## 2022-01-01 RX ADMIN — NYSTATIN 1000000 UNITS: 100000 SUSPENSION ORAL at 21:19

## 2022-01-01 RX ADMIN — NYSTATIN 1000000 UNITS: 100000 SUSPENSION ORAL at 17:54

## 2022-01-01 RX ADMIN — IPRATROPIUM BROMIDE AND ALBUTEROL SULFATE 3 ML: .5; 3 SOLUTION RESPIRATORY (INHALATION) at 02:17

## 2022-01-01 RX ADMIN — AMIODARONE HYDROCHLORIDE 150 MG: 1.5 INJECTION, SOLUTION INTRAVENOUS at 15:30

## 2022-01-01 RX ADMIN — OXYCODONE AND ACETAMINOPHEN 1 TABLET: 10; 325 TABLET ORAL at 21:19

## 2022-01-01 RX ADMIN — HYDROCORTISONE SODIUM SUCCINATE 50 MG: 100 INJECTION, POWDER, FOR SOLUTION INTRAMUSCULAR; INTRAVENOUS at 05:33

## 2022-01-01 RX ADMIN — SODIUM CHLORIDE, POTASSIUM CHLORIDE, SODIUM LACTATE AND CALCIUM CHLORIDE: 600; 310; 30; 20 INJECTION, SOLUTION INTRAVENOUS at 14:45

## 2022-01-01 RX ADMIN — AMIODARONE HYDROCHLORIDE 400 MG: 200 TABLET ORAL at 17:32

## 2022-01-01 RX ADMIN — METHOCARBAMOL 500 MG: 500 TABLET ORAL at 18:29

## 2022-01-01 RX ADMIN — SENNOSIDES AND DOCUSATE SODIUM 2 TABLET: 50; 8.6 TABLET ORAL at 17:56

## 2022-01-01 RX ADMIN — ATORVASTATIN CALCIUM 40 MG: 40 TABLET, FILM COATED ORAL at 18:19

## 2022-01-01 RX ADMIN — OXYCODONE AND ACETAMINOPHEN 1 TABLET: 10; 325 TABLET ORAL at 14:19

## 2022-01-01 RX ADMIN — FERROUS SULFATE TAB 325 MG (65 MG ELEMENTAL FE) 325 MG: 325 (65 FE) TAB at 05:43

## 2022-01-01 RX ADMIN — IPRATROPIUM BROMIDE AND ALBUTEROL SULFATE 3 ML: .5; 3 SOLUTION RESPIRATORY (INHALATION) at 22:12

## 2022-01-01 RX ADMIN — HEPARIN SODIUM 7100 UNITS: 1000 INJECTION, SOLUTION INTRAVENOUS; SUBCUTANEOUS at 16:51

## 2022-01-01 RX ADMIN — METOPROLOL TARTRATE 5 MG: 1 INJECTION, SOLUTION INTRAVENOUS at 14:45

## 2022-01-01 RX ADMIN — SODIUM CHLORIDE 2 G: 1 TABLET ORAL at 12:16

## 2022-01-01 RX ADMIN — IPRATROPIUM BROMIDE AND ALBUTEROL SULFATE 3 ML: .5; 3 SOLUTION RESPIRATORY (INHALATION) at 15:07

## 2022-01-01 RX ADMIN — NOREPINEPHRINE BITARTRATE 20 MCG/MIN: 1 INJECTION, SOLUTION, CONCENTRATE INTRAVENOUS at 02:23

## 2022-01-01 RX ADMIN — NYSTATIN 1000000 UNITS: 100000 SUSPENSION ORAL at 09:00

## 2022-01-01 RX ADMIN — DEXTROSE MONOHYDRATE: 50 INJECTION, SOLUTION INTRAVENOUS at 14:59

## 2022-01-01 RX ADMIN — CARVEDILOL 6.25 MG: 6.25 TABLET, FILM COATED ORAL at 08:40

## 2022-01-01 RX ADMIN — TRAZODONE HYDROCHLORIDE 100 MG: 100 TABLET ORAL at 20:37

## 2022-01-01 RX ADMIN — INSULIN HUMAN 1 UNITS: 100 INJECTION, SOLUTION PARENTERAL at 21:03

## 2022-01-01 RX ADMIN — EPHEDRINE SULFATE 10 MG: 50 INJECTION INTRAMUSCULAR; INTRAVENOUS; SUBCUTANEOUS at 14:15

## 2022-01-01 RX ADMIN — MORPHINE SULFATE 10 MG: 10 INJECTION INTRAVENOUS at 21:56

## 2022-01-01 RX ADMIN — CELECOXIB 100 MG: 100 CAPSULE ORAL at 11:27

## 2022-01-01 RX ADMIN — LORAZEPAM 0.5 MG: 2 INJECTION INTRAMUSCULAR; INTRAVENOUS at 12:49

## 2022-01-01 RX ADMIN — HYDROMORPHONE HYDROCHLORIDE 1 MG: 1 INJECTION, SOLUTION INTRAMUSCULAR; INTRAVENOUS; SUBCUTANEOUS at 07:41

## 2022-01-01 RX ADMIN — CEFAZOLIN 2 G: 330 INJECTION, POWDER, FOR SOLUTION INTRAMUSCULAR; INTRAVENOUS at 13:16

## 2022-01-01 RX ADMIN — IPRATROPIUM BROMIDE AND ALBUTEROL SULFATE 3 ML: .5; 2.5 SOLUTION RESPIRATORY (INHALATION) at 15:52

## 2022-01-01 RX ADMIN — LORAZEPAM 0.5 MG: 2 INJECTION INTRAMUSCULAR; INTRAVENOUS at 01:38

## 2022-01-01 RX ADMIN — CEFEPIME 2 G: 2 INJECTION, POWDER, FOR SOLUTION INTRAVENOUS at 04:54

## 2022-01-01 RX ADMIN — HEPARIN SODIUM 18 UNITS/KG/HR: 5000 INJECTION, SOLUTION INTRAVENOUS at 20:27

## 2022-01-01 RX ADMIN — CEFEPIME 2 G: 2 INJECTION, POWDER, FOR SOLUTION INTRAVENOUS at 22:10

## 2022-01-01 RX ADMIN — LINEZOLID 600 MG: 600 TABLET, FILM COATED ORAL at 05:20

## 2022-01-01 RX ADMIN — OXYCODONE AND ACETAMINOPHEN 1 TABLET: 10; 325 TABLET ORAL at 16:54

## 2022-01-01 RX ADMIN — LORAZEPAM 0.5 MG: 2 INJECTION INTRAMUSCULAR; INTRAVENOUS at 19:30

## 2022-01-01 RX ADMIN — IPRATROPIUM BROMIDE AND ALBUTEROL SULFATE 3 ML: .5; 2.5 SOLUTION RESPIRATORY (INHALATION) at 07:21

## 2022-01-01 RX ADMIN — NYSTATIN 1000000 UNITS: 100000 SUSPENSION ORAL at 08:40

## 2022-01-01 ASSESSMENT — ENCOUNTER SYMPTOMS
FLANK PAIN: 0
SPUTUM PRODUCTION: 0
COUGH: 1
ABDOMINAL PAIN: 0
ABDOMINAL PAIN: 0
SORE THROAT: 0
HEADACHES: 0
WEIGHT LOSS: 1
EYE DISCHARGE: 0
DIZZINESS: 0
WHEEZING: 0
NERVOUS/ANXIOUS: 1
PALPITATIONS: 0
FOCAL WEAKNESS: 0
VOMITING: 0
HEADACHES: 0
CHEST TIGHTNESS: 0
EYES NEGATIVE: 1
SORE THROAT: 0
NAUSEA: 0
ABDOMINAL PAIN: 0
NAUSEA: 0
FEVER: 0
STRIDOR: 0
NERVOUS/ANXIOUS: 1
PALPITATIONS: 0
HEADACHES: 0
SPUTUM PRODUCTION: 0
BACK PAIN: 1
HEADACHES: 0
FEVER: 0
SPUTUM PRODUCTION: 0
CHILLS: 0
NAUSEA: 0
CHILLS: 0
PALPITATIONS: 0
PALPITATIONS: 0
DIZZINESS: 0
MUSCULOSKELETAL NEGATIVE: 1
PALPITATIONS: 0
SPUTUM PRODUCTION: 0
EYE DISCHARGE: 0
DOUBLE VISION: 0
DIZZINESS: 0
SHORTNESS OF BREATH: 1
EYE DISCHARGE: 0
CHILLS: 1
CHILLS: 0
EYES NEGATIVE: 1
NERVOUS/ANXIOUS: 1
FEVER: 0
FEVER: 0
DIZZINESS: 0
MUSCULOSKELETAL NEGATIVE: 1
BACK PAIN: 1
MYALGIAS: 0
PALPITATIONS: 1
WEIGHT LOSS: 1
CHOKING: 0
HEADACHES: 0
PALPITATIONS: 0
SORE THROAT: 0
FEVER: 0
MUSCULOSKELETAL NEGATIVE: 1
FEVER: 0
SHORTNESS OF BREATH: 0
NAUSEA: 0
WEIGHT LOSS: 1
CHOKING: 0
WHEEZING: 0
SHORTNESS OF BREATH: 1
STRIDOR: 0
FOCAL WEAKNESS: 0
SPEECH CHANGE: 0
BLURRED VISION: 0
HEARTBURN: 0
EYE DISCHARGE: 0
NAUSEA: 0
SORE THROAT: 0
VOMITING: 0
ABDOMINAL PAIN: 0
EYES NEGATIVE: 1
COUGH: 0
SHORTNESS OF BREATH: 1
COUGH: 1
SORE THROAT: 0
ABDOMINAL PAIN: 0
SHORTNESS OF BREATH: 0
NAUSEA: 0
NAUSEA: 0
FEVER: 0
SHORTNESS OF BREATH: 1
SHORTNESS OF BREATH: 0
NERVOUS/ANXIOUS: 1
BRUISES/BLEEDS EASILY: 0
VOMITING: 0
WEIGHT LOSS: 1
SPUTUM PRODUCTION: 0
SPUTUM PRODUCTION: 0
DEPRESSION: 0
HEADACHES: 0
COUGH: 1
SHORTNESS OF BREATH: 1
FEVER: 1
COUGH: 1
PALPITATIONS: 0
COUGH: 0
ABDOMINAL PAIN: 0
CHEST TIGHTNESS: 0
SORE THROAT: 0
HEADACHES: 0
FOCAL WEAKNESS: 0
COUGH: 1
VOMITING: 0
ABDOMINAL PAIN: 0
NAUSEA: 0
BACK PAIN: 1
WEAKNESS: 1
ABDOMINAL PAIN: 0
DIZZINESS: 0
FOCAL WEAKNESS: 0
SPUTUM PRODUCTION: 0
BACK PAIN: 1
SORE THROAT: 0
HEADACHES: 0

## 2022-01-01 ASSESSMENT — PAIN DESCRIPTION - PAIN TYPE
TYPE: ACUTE PAIN
TYPE: CHRONIC PAIN
TYPE: CHRONIC PAIN
TYPE: ACUTE PAIN
TYPE: ACUTE PAIN
TYPE: ACUTE PAIN;CHRONIC PAIN
TYPE: ACUTE PAIN;CHRONIC PAIN
TYPE: CHRONIC PAIN
TYPE: ACUTE PAIN
TYPE: ACUTE PAIN;CHRONIC PAIN
TYPE: ACUTE PAIN
TYPE: CHRONIC PAIN
TYPE: ACUTE PAIN
TYPE: ACUTE PAIN;CHRONIC PAIN
TYPE: CHRONIC PAIN
TYPE: ACUTE PAIN;CHRONIC PAIN
TYPE: CHRONIC PAIN
TYPE: CHRONIC PAIN
TYPE: ACUTE PAIN;CHRONIC PAIN
TYPE: CHRONIC PAIN
TYPE: CHRONIC PAIN
TYPE: ACUTE PAIN
TYPE: ACUTE PAIN
TYPE: ACUTE PAIN;CHRONIC PAIN
TYPE: ACUTE PAIN
TYPE: ACUTE PAIN;CHRONIC PAIN
TYPE: CHRONIC PAIN
TYPE: ACUTE PAIN;CHRONIC PAIN
TYPE: ACUTE PAIN;CHRONIC PAIN
TYPE: CHRONIC PAIN
TYPE: CHRONIC PAIN
TYPE: ACUTE PAIN
TYPE: ACUTE PAIN;CHRONIC PAIN
TYPE: CHRONIC PAIN
TYPE: ACUTE PAIN
TYPE: ACUTE PAIN;CHRONIC PAIN
TYPE: ACUTE PAIN;CHRONIC PAIN
TYPE: CHRONIC PAIN
TYPE: ACUTE PAIN;CHRONIC PAIN
TYPE: ACUTE PAIN;CHRONIC PAIN
TYPE: CHRONIC PAIN
TYPE: ACUTE PAIN;CHRONIC PAIN
TYPE: ACUTE PAIN
TYPE: ACUTE PAIN;CHRONIC PAIN
TYPE: CHRONIC PAIN
TYPE: ACUTE PAIN;CHRONIC PAIN
TYPE: ACUTE PAIN
TYPE: ACUTE PAIN;CHRONIC PAIN
TYPE: CHRONIC PAIN
TYPE: ACUTE PAIN
TYPE: CHRONIC PAIN
TYPE: ACUTE PAIN
TYPE: ACUTE PAIN;CHRONIC PAIN
TYPE: ACUTE PAIN
TYPE: ACUTE PAIN
TYPE: ACUTE PAIN;CHRONIC PAIN
TYPE: CHRONIC PAIN
TYPE: ACUTE PAIN
TYPE: ACUTE PAIN;CHRONIC PAIN
TYPE: ACUTE PAIN;CHRONIC PAIN
TYPE: ACUTE PAIN
TYPE: CHRONIC PAIN
TYPE: CHRONIC PAIN
TYPE: ACUTE PAIN
TYPE: CHRONIC PAIN
TYPE: ACUTE PAIN;CHRONIC PAIN
TYPE: CHRONIC PAIN
TYPE: ACUTE PAIN
TYPE: ACUTE PAIN
TYPE: ACUTE PAIN;CHRONIC PAIN
TYPE: ACUTE PAIN;CHRONIC PAIN
TYPE: CHRONIC PAIN
TYPE: ACUTE PAIN
TYPE: CHRONIC PAIN
TYPE: ACUTE PAIN;CHRONIC PAIN
TYPE: ACUTE PAIN
TYPE: ACUTE PAIN;CHRONIC PAIN
TYPE: ACUTE PAIN
TYPE: ACUTE PAIN;CHRONIC PAIN
TYPE: CHRONIC PAIN
TYPE: CHRONIC PAIN
TYPE: ACUTE PAIN;CHRONIC PAIN
TYPE: ACUTE PAIN
TYPE: ACUTE PAIN
TYPE: ACUTE PAIN;CHRONIC PAIN
TYPE: CHRONIC PAIN
TYPE: CHRONIC PAIN
TYPE: ACUTE PAIN
TYPE: ACUTE PAIN;CHRONIC PAIN
TYPE: ACUTE PAIN
TYPE: CHRONIC PAIN
TYPE: CHRONIC PAIN
TYPE: ACUTE PAIN;CHRONIC PAIN

## 2022-01-01 ASSESSMENT — COGNITIVE AND FUNCTIONAL STATUS - GENERAL
DRESSING REGULAR LOWER BODY CLOTHING: A LOT
CLIMB 3 TO 5 STEPS WITH RAILING: TOTAL
DAILY ACTIVITIY SCORE: 19
MOVING FROM LYING ON BACK TO SITTING ON SIDE OF FLAT BED: A LITTLE
MOVING TO AND FROM BED TO CHAIR: A LITTLE
SUGGESTED CMS G CODE MODIFIER DAILY ACTIVITY: CL
CLIMB 3 TO 5 STEPS WITH RAILING: A LITTLE
TURNING FROM BACK TO SIDE WHILE IN FLAT BAD: A LITTLE
STANDING UP FROM CHAIR USING ARMS: A LITTLE
DRESSING REGULAR LOWER BODY CLOTHING: A LITTLE
DRESSING REGULAR UPPER BODY CLOTHING: A LITTLE
SUGGESTED CMS G CODE MODIFIER DAILY ACTIVITY: CK
MOVING FROM LYING ON BACK TO SITTING ON SIDE OF FLAT BED: A LOT
DRESSING REGULAR UPPER BODY CLOTHING: A LOT
WALKING IN HOSPITAL ROOM: A LITTLE
PERSONAL GROOMING: A LITTLE
MOBILITY SCORE: 19
MOBILITY SCORE: 13
HELP NEEDED FOR BATHING: A LITTLE
TOILETING: A LOT
PERSONAL GROOMING: A LOT
WALKING IN HOSPITAL ROOM: A LOT
SUGGESTED CMS G CODE MODIFIER MOBILITY: CK
EATING MEALS: A LOT
STANDING UP FROM CHAIR USING ARMS: A LOT
HELP NEEDED FOR BATHING: A LOT
TOILETING: A LITTLE
SUGGESTED CMS G CODE MODIFIER MOBILITY: CL
MOVING TO AND FROM BED TO CHAIR: A LITTLE
DAILY ACTIVITIY SCORE: 12

## 2022-01-01 ASSESSMENT — PATIENT HEALTH QUESTIONNAIRE - PHQ9
1. LITTLE INTEREST OR PLEASURE IN DOING THINGS: NOT AT ALL
SUM OF ALL RESPONSES TO PHQ9 QUESTIONS 1 AND 2: 0
2. FEELING DOWN, DEPRESSED, IRRITABLE, OR HOPELESS: NOT AT ALL
SUM OF ALL RESPONSES TO PHQ QUESTIONS 1-9: 0
1. LITTLE INTEREST OR PLEASURE IN DOING THINGS: NOT AT ALL
1. LITTLE INTEREST OR PLEASURE IN DOING THINGS: NOT AT ALL
SUM OF ALL RESPONSES TO PHQ9 QUESTIONS 1 AND 2: 0
4. FEELING TIRED OR HAVING LITTLE ENERGY: NOT AT ALL
2. FEELING DOWN, DEPRESSED, IRRITABLE, OR HOPELESS: NOT AT ALL
SUM OF ALL RESPONSES TO PHQ9 QUESTIONS 1 AND 2: 0
6. FEELING BAD ABOUT YOURSELF - OR THAT YOU ARE A FAILURE OR HAVE LET YOURSELF OR YOUR FAMILY DOWN: NOT AL ALL
2. FEELING DOWN, DEPRESSED, IRRITABLE, OR HOPELESS: NOT AT ALL
SUM OF ALL RESPONSES TO PHQ9 QUESTIONS 1 AND 2: 0
2. FEELING DOWN, DEPRESSED, IRRITABLE, OR HOPELESS: NOT AT ALL
7. TROUBLE CONCENTRATING ON THINGS, SUCH AS READING THE NEWSPAPER OR WATCHING TELEVISION: NOT AT ALL
2. FEELING DOWN, DEPRESSED, IRRITABLE, OR HOPELESS: NOT AT ALL
1. LITTLE INTEREST OR PLEASURE IN DOING THINGS: NOT AT ALL
3. TROUBLE FALLING OR STAYING ASLEEP OR SLEEPING TOO MUCH: NOT AT ALL
8. MOVING OR SPEAKING SO SLOWLY THAT OTHER PEOPLE COULD HAVE NOTICED. OR THE OPPOSITE, BEING SO FIGETY OR RESTLESS THAT YOU HAVE BEEN MOVING AROUND A LOT MORE THAN USUAL: NOT AT ALL
9. THOUGHTS THAT YOU WOULD BE BETTER OFF DEAD, OR OF HURTING YOURSELF: NOT AT ALL
1. LITTLE INTEREST OR PLEASURE IN DOING THINGS: NOT AT ALL
SUM OF ALL RESPONSES TO PHQ9 QUESTIONS 1 AND 2: 0
SUM OF ALL RESPONSES TO PHQ9 QUESTIONS 1 AND 2: 0
1. LITTLE INTEREST OR PLEASURE IN DOING THINGS: NOT AT ALL
5. POOR APPETITE OR OVEREATING: NOT AT ALL
2. FEELING DOWN, DEPRESSED, IRRITABLE, OR HOPELESS: NOT AT ALL
1. LITTLE INTEREST OR PLEASURE IN DOING THINGS: NOT AT ALL
SUM OF ALL RESPONSES TO PHQ9 QUESTIONS 1 AND 2: 0
2. FEELING DOWN, DEPRESSED, IRRITABLE, OR HOPELESS: NOT AT ALL

## 2022-01-01 ASSESSMENT — FIBROSIS 4 INDEX
FIB4 SCORE: 0.88
FIB4 SCORE: 0.88
FIB4 SCORE: 2.27
FIB4 SCORE: 1.28
FIB4 SCORE: 2.02

## 2022-01-01 ASSESSMENT — LIFESTYLE VARIABLES
DOES PATIENT WANT TO STOP DRINKING: NO
EVER_SMOKED: YES
ALCOHOL_USE: NO
HOW MANY TIMES IN THE PAST YEAR HAVE YOU HAD 5 OR MORE DRINKS IN A DAY: 0
SUBSTANCE_ABUSE: 0
TOTAL SCORE: 0
EVER HAD A DRINK FIRST THING IN THE MORNING TO STEADY YOUR NERVES TO GET RID OF A HANGOVER: NO
EVER FELT BAD OR GUILTY ABOUT YOUR DRINKING: NO
AVERAGE NUMBER OF DAYS PER WEEK YOU HAVE A DRINK CONTAINING ALCOHOL: 0
HAVE PEOPLE ANNOYED YOU BY CRITICIZING YOUR DRINKING: NO
HAVE YOU EVER FELT YOU SHOULD CUT DOWN ON YOUR DRINKING: NO
ON A TYPICAL DAY WHEN YOU DRINK ALCOHOL HOW MANY DRINKS DO YOU HAVE: 0
TOTAL SCORE: 0
TOTAL SCORE: 0
CONSUMPTION TOTAL: NEGATIVE

## 2022-02-04 NOTE — PATIENT INSTRUCTIONS
A - Antiplatelet - Aspirin 81 mg daily or other antiplatelets (clopidogrel (PLAVIX), prasrugrel (EFFIENT), ticagrelor (BRILINTA)) reduce your risk.  B - Blood Pressure Control - reduces your risk or heart attack and stroke.  C - Cholesterol Management - statins reduce your risk; for those that are intolerant to statins, there are alternatives.  D - Diet - Cardiac rehab diets, [email or web address].  E - Exercise - at least 5 hours of moderate exercise weekly  (typical brisk walking or similar activity).  F - Fats - VASCEPA,  or Fish oil (if Vascepa too expensive) for elevated triglycerides (REDUCE IT trial showed reduction from 22% 5 year MACE to 17%).  G - Good Vibes - meditation, exercise, yoga, mindfulness, stress reduction.  H - Heart Failure - betablockers, sucubatril (ENTRESTO) 16% less risk of dying over 3 years, spironolactone, dapagliflozin (FARXIGA) 17% less risk of dying over 2 years, CRT +/- ICD.  I - Inflammation - Colchicine in the LoDoCo2 study in 2020 reduced the risk of heart attack by 30% in 2.5 year follow up.  R - Rehab - Cardiac rehab reduces risk of dying by 13-24% and need to go to the hospital by 30% within the first year.  S - Smoking - never smoke, if you do smoke ask for help to quit for good.  T - Type II Diabetes - pills empagliflozin (JARDIANCE) 38% less risk of dying over 4 years, and/or weekly injections liraglutide (Victoza), semaglutide (Ozempic), dulaglutide (Trulicity) ~26% less risk of MACE in 2 years.  V - Vaccines - Annual flu shot and COVID vaccine reduces the risk of serious cardiovascular complications from these deadly infections.  W - Weight - maintain a healthy weight.      Work on at least 1.5 - 5 hours a week of moderate exercise    Please look into the following diets and incorporate them into your diet  LOW SALT DIET   KEEP YOUR SODIUM EQUAL TO CALORIES AND NO MORE THAN DOUBLE THE CALORIES FOR A LOW SALT DIET    Cardiosmart.org - great resource for American College of  Cardiology on heart disease prevention and treatment    FOR TREATMENT OR PREVENTION OF CORONARY ARTERY DISEASE  These three programs are approved by Medicare/Insurers for those with heart disease  [facility] - [facility]  Dr. Muñoz's Program for Reversing Heart Disease - Pete Benites's Cardiologist vegetarian-based  [facility] - [facility]    This is a commonly referenced Program  Dr Munoz - [employer] [facility] [employer] (book and documentary) - vegetarian-based    FOR TREATMENT OF BLOOD PRESSURE  DASH DIET - American Heart Association for treatment of HYPERTENSION    FOR TREATMENT OF BAD CHOLESTEROL/FATS  REDUCE PROCESSED SUGAR AS MUCH AS POSSIBLE  INCREASE WHOLE GRAINS/VEGETABLES  INCREASE FIBER    Lowering total cholesterol and LDL (bad) cholesterol:  - Eat leaner cuts of meat, or eliminate altogether if possible red meat, and frequently substitute fish or chicken.  - Limit saturated fat to no more than 7-10% of total calories no more than 10 g per day is recommended. Some sources of saturated fat include butter, animal fats, hydrogenated vegetable fats and oils, many desserts, whole milk dairy products.  - Replaced saturated fats with polyunsaturated fats and monounsaturated fats. Foods high in monounsaturated fat include nuts, canola oil, avocados, and olives.  - Limit trans fat (processed foods) and replaced with fresh fruits and vegetables  - Recommend nonfat dairy products  - Increase substantially the amount of soluble fiber intake (legumes such as beans, fruit, whole grains).  - Consider nutritional supplements: plant sterile spreads such as Benecol, fish oil,  flaxseed oil, omega-3 acids capsules 1000 mg twice a day, or viscous fiber such as Metamucil  - Attain ideal weight and regular exercise (at least 30 minutes per day of moderate exercise)  ASK ABOUT STATIN OR NON STATIN MEDICATION TO REDUCE YOUR LDL AND HEART RISK    Lowering  triglycerides:  - Reduce intake of simple sugar: Desserts, candy, pastries, honey, sodas, sugared cereals, yogurt, Gatorade, sports bars, canned fruit, smoothies, fruit juice, coffee drinks  - Reduced intake of refined starches: Refined Pasta, most bread  - Reduce or abstain from alcohol  - Increase omega-3 fatty acids: [place], Trout, Mackerel, Herring, Albacore tuna and supplements  - Attain ideal weight and regular exercise (at least 30 minutes per day of moderate exercise)  ASK ABOUT PURIFIED OMEGA 3 EPA or FISH OIL TO REDUCE YOUR TG AND HEART RISK    Elevating HDL (good) cholesterol:  - Increase physical activity  - Increase omega-3 fatty acids and supplements as listed above  - Incorporating appropriate amounts of monounsaturated fats such as nuts, olive oil, canola oil, avocados, olives  - Stop smoking  - Attain ideal weight and regular exercise (at least 30 minutes per day of moderate exercise)

## 2022-02-04 NOTE — PROGRESS NOTES
Chief Complaint   Patient presents with   • Coronary Artery Disease     F/V Dx: Coronary artery disease due to lipid rich plaque   • Dyslipidemia   • CHF (Systolic)     F/V Dx: Chronic systolic congestive heart failure        Subjective     Migue Cervantes is a 66 y.o. male who presents today for his history of ischemic cardiomyopathy with STEMI had seen him back in 2017 and he saw Dr. Campos had a defibrillator for low EF with Dr. Medeiros but subsequently has had improvement in his ejection fraction last 50% in 2020 he is here to reestablish in clinic as it been a couple years with the Covid pandemic [facility]    Past Medical History:   Diagnosis Date   • Coronary artery disease due to lipid rich plaque    • Dyslipidemia    • Essential hypertension    • Former tobacco use - quit Sep 2017 in setting of STEMI    • Heart burn    • History of ST elevation myocardial infarction (STEMI) LAD September 2017    • Indigestion    • Pain 09-20-12    lower back, 8/10   • Status post insertion of drug-eluting stent into left anterior descending artery      Past Surgical History:   Procedure Laterality Date   • MARQUISE BY LAPAROSCOPY N/A 12/28/2015    Procedure: MARQUISE BY LAPAROSCOPY- With Grams ;  Surgeon: Sandro Obregon M.D.;  Location: SURGERY [facility];  Service:    • LUMBAR DECOMPRESSION  9/25/2012    by Dr. Plata   • FUSION, SPINE, LUMBAR, PLIF  9/25/2012    Performed by Mckayla Plata M.D. at SURGERY [facility]   • LUMBAR DECOMPRESSION  9/25/2012    Performed by Mckayla Plata M.D. at SURGERY [facility]   • CERVICAL DISK AND FUSION ANTERIOR  1997/2010    x 2     Family History   Problem Relation Age of Onset   • Heart Attack Father    • Stroke Father    • Heart Attack Paternal Grandmother    • Heart Attack Paternal Grandfather      Social History     Socioeconomic History   • Marital status:      Spouse name: Not on file   • Number of children: Not  on file   • Years of education: Not on file   • Highest education level: Not on file   Occupational History   • Not on file   Tobacco Use   • Smoking status: Former Smoker     Packs/day: 2.00     Years: 35.00     Pack years: 70.00     Types: Cigarettes     Quit date: 2017     Years since quittin.4   • Smokeless tobacco: Never Used   Substance and Sexual Activity   • Alcohol use: No     Comment: hx; quit    • Drug use: No   • Sexual activity: Not on file   Other Topics Concern   • Not on file   Social History Narrative   • Not on file     Social Determinants of Health     Financial Resource Strain:    • Difficulty of Paying Living Expenses: Not on file   Food Insecurity:    • Worried About Running Out of Food in the Last Year: Not on file   • Ran Out of Food in the Last Year: Not on file   Transportation Needs:    • Lack of Transportation (Medical): Not on file   • Lack of Transportation (Non-Medical): Not on file   Physical Activity:    • Days of Exercise per Week: Not on file   • Minutes of Exercise per Session: Not on file   Stress:    • Feeling of Stress : Not on file   Social Connections:    • Frequency of Communication with Friends and Family: Not on file   • Frequency of Social Gatherings with Friends and Family: Not on file   • Attends Rastafari Services: Not on file   • Active Member of Clubs or Organizations: Not on file   • Attends Club or Organization Meetings: Not on file   • Marital Status: Not on file   Intimate Partner Violence:    • Fear of Current or Ex-Partner: Not on file   • Emotionally Abused: Not on file   • Physically Abused: Not on file   • Sexually Abused: Not on file   Housing Stability:    • Unable to Pay for Housing in the Last Year: Not on file   • Number of Places Lived in the Last Year: Not on file   • Unstable Housing in the Last Year: Not on file     Allergies   Allergen Reactions   • Nkda [No Known Drug Allergy]      Outpatient Encounter Medications as of 2/3/2022    Medication Sig Dispense Refill   • albuterol 108 (90 Base) MCG/ACT Aero Soln inhalation aerosol Inhale 2 Puffs every 6 hours as needed for Shortness of Breath.     • clotrimazole (LOTRIMIN) 1 % Cream Apply 1 Application topically 2 times a day.     • cyclobenzaprine (FLEXERIL) 10 mg Tab Take 10 mg by mouth 3 times a day as needed.     • docusate calcium (SURFAK) 240 MG Cap Take 240 mg by mouth 2 times a day.     • ferrous sulfate 325 (65 Fe) MG tablet Take 325 mg by mouth every day.     • Fluticasone Furoate 50 MCG/ACT AEROSOL POWDER, BREATH ACTIVATED Inhale.     • HYDROcodone-acetaminophen (NORCO) 7.5-325 MG per tablet Take 1 Tablet by mouth every 6 hours as needed.     • tiotropium (SPIRIVA RESPIMAT) 2.5 mcg/Act Aero Soln Inhale 5 mcg every day.     • traMADol (ULTRAM) 50 MG Tab Take 50 mg by mouth every four hours as needed.     • lisinopril (PRINIVIL) 2.5 MG Tab Take 1 Tab by mouth every day. 30 Tab 3   • carvedilol (COREG) 25 MG Tab Take 1 Tab by mouth 2 times a day, with meals. 180 Tab 3   • spironolactone (ALDACTONE) 25 MG Tab Take 1 Tab by mouth every day. 90 Tab 3   • omeprazole (PRILOSEC) 40 MG delayed-release capsule Take 40 mg by mouth every day.     • traZODone (DESYREL) 50 MG Tab Take 100 mg by mouth every evening.     • aspirin EC 81 MG EC tablet Take 1 Tab by mouth every day. 30 Tab 3   • atorvastatin (LIPITOR) 40 MG Tab Take 1 Tab by mouth every bedtime. 30 Tab 3   • [DISCONTINUED] torsemide (DEMADEX) 20 MG Tab Take 1 Tab by mouth every day. (Patient not taking: Reported on 2/3/2022) 30 Tab 3   • [DISCONTINUED] BUDESONIDE PO Take  by mouth. (Patient not taking: Reported on 2/3/2022)     • [DISCONTINUED] sertraline (ZOLOFT) 50 MG Tab Take 50 mg by mouth every day. (Patient not taking: Reported on 2/3/2022)     • [DISCONTINUED] oxycodone immediate-release (ROXICODONE) 5 MG Tab Take 1 Tab by mouth every four hours as needed for Severe Pain. (Patient not taking: Reported on 2/3/2022) 30 Tab 0     No  facility-administered encounter medications on file as of 2/3/2022.     ROS           Objective     /70 (BP Location: Left arm, Patient Position: Sitting, BP Cuff Size: Adult)   Pulse 72   Resp 18   Ht 1.829 m (6')   Wt 117 kg (257 lb)   SpO2 96%   BMI 34.86 kg/m²     Physical Exam  Constitutional:       General: He is not in acute distress.     Appearance: He is not diaphoretic.   Eyes:      General: No scleral icterus.  Neck:      Vascular: No JVD.   Cardiovascular:      Rate and Rhythm: Normal rate.      Heart sounds: Normal heart sounds. No murmur heard.  No friction rub. No gallop.    Pulmonary:      Effort: No respiratory distress.      Breath sounds: No wheezing or rales.   Abdominal:      General: Bowel sounds are normal.      Palpations: Abdomen is soft.   Skin:     Findings: No rash.   Neurological:      Mental Status: He is alert.              We reviewed in person the most recent labs    [facility]      Assessment & Plan     1. History of ST elevation myocardial infarction (STEMI) LAD September 2017     2. Dyslipidemia     3. Coronary artery disease due to lipid rich plaque     4. ICD (implantable cardioverter-defibrillator), [employer]         Medical Decision Making: Today's Assessment/Status/Plan:          It was my pleasure to meet with Mr. Cervantes.    We addressed the management of hypertension at today's visit. Blood pressure is well controlled.  We specifically assessed the labs on hypertension treatment    We addressed the management of dyslipidemia at today's visit. He is on appropriate statin.    Labs again reestablish in the pacer clinic here in [place]    His weight has been stable without need for Lasix he is on appropriate spironolactone and has regular lab follow-up    I will see Mr. Cervantes back in 1 year time, with pacer check in 6 months in the interim, and I encouraged him to follow up with us over the phone or  electronically using my [employer] as issues arise.    It is my pleasure to participate in the care of Mr. Cervantes.  Please do not hesitate to contact me with questions or concerns.    Bart Salazar MD PhD [facility]  Cardiologist [facility]    Please note that this dictation was created using voice recognition software. There may be errors I did not discover before finalizing the note.

## 2022-03-31 NOTE — PROGRESS NOTES
Patient had AICD implanted in July 2018 by Dr. Ayon (at the [facility]), and was previously followed by Dr. Campos, last seen in March 2019.    He saw Dr. Salazar in February 2022 to re-establish care, and is here today for overdue AICD interrogation.    He is feeling good, with no shocks.    Device is working normally.  No device therapy.  No mode switching episodes.  Normal sensing and capture of RA and RV leads; stable impedances. Battery longevity is 6.1 years.  No changes are made today.    Follow-up in 6 months for next AICD check with me.

## 2022-10-26 PROBLEM — G89.4 CHRONIC PAIN SYNDROME: Status: ACTIVE | Noted: 2022-01-01

## 2022-10-26 PROBLEM — I82.409 ACUTE DVT (DEEP VENOUS THROMBOSIS) (HCC): Status: ACTIVE | Noted: 2022-01-01

## 2022-10-26 PROBLEM — J90 PLEURAL EFFUSION: Status: ACTIVE | Noted: 2022-01-01

## 2022-10-26 PROBLEM — Z71.89 ACP (ADVANCE CARE PLANNING): Status: ACTIVE | Noted: 2022-01-01

## 2022-10-26 PROBLEM — J91.8 PARAPNEUMONIC EFFUSION: Status: ACTIVE | Noted: 2022-01-01

## 2022-10-26 PROBLEM — I48.91 ATRIAL FIBRILLATION WITH RAPID VENTRICULAR RESPONSE (HCC): Status: ACTIVE | Noted: 2022-01-01

## 2022-10-26 PROBLEM — J18.9 PARAPNEUMONIC EFFUSION: Status: ACTIVE | Noted: 2022-01-01

## 2022-10-26 PROBLEM — I50.22 CHRONIC HFREF (HEART FAILURE WITH REDUCED EJECTION FRACTION) (HCC): Status: ACTIVE | Noted: 2022-01-01

## 2022-10-26 PROBLEM — J96.21 ACUTE ON CHRONIC RESPIRATORY FAILURE WITH HYPOXIA (HCC): Status: ACTIVE | Noted: 2022-01-01

## 2022-10-26 PROBLEM — J90 LOCULATED PLEURAL EFFUSION: Status: ACTIVE | Noted: 2022-01-01

## 2022-10-26 PROBLEM — I47.20 V-TACH (HCC): Status: RESOLVED | Noted: 2022-01-01 | Resolved: 2022-01-01

## 2022-10-26 PROBLEM — I47.20 V-TACH (HCC): Status: ACTIVE | Noted: 2022-01-01

## 2022-10-26 NOTE — CARE PLAN
The patient is Watcher - Medium risk of patient condition declining or worsening    Shift Goals  Clinical Goals: comfort; CT  Patient Goals: rest; food  Family Goals: GOPI    Progress made toward(s) clinical / shift goals: Pt educated on the risk of falling at the hospital and how to call for help. Pt verbalized understanding.    Patient is not progressing towards the following goals:

## 2022-10-26 NOTE — PROGRESS NOTES
4 Eyes Skin Assessment Completed by ARGENTINA Zhou and ARGENTINA Agrawal.    Head WDL  Ears WDL  Nose WDL  Mouth WDL  Neck WDL  Breast/Chest WDL  Shoulder Blades WDL  Spine WDL  (R) Arm/Elbow/Hand WDL  (L) Arm/Elbow/Hand WDL  Abdomen WDL  Groin WDL  Scrotum/Coccyx/Buttocks WDL  (R) Leg WDL  (L) Leg WDL  (R) Heel/Foot/Toe WDL  (L) Heel/Foot/Toe WDL          Devices In Places Tele Box, Blood Pressure Cuff, Pulse Ox, and Oxy Mask      Interventions In Place Low Air Loss Mattress    Possible Skin Injury No    Pictures Uploaded Into Epic N/A  Wound Consult Placed N/A  RN Wound Prevention Protocol Ordered No

## 2022-10-26 NOTE — PROGRESS NOTES
Med rec completed per discussion with family at bedside with medicating list on phone, [facility], and PDMP  NKDA confirmed

## 2022-10-26 NOTE — ASSESSMENT & PLAN NOTE
H/o cervical fusion and back surgeries  Home Percocet q 4 hr prn and lyrica 25mg daily  Added dilaudid after his chest tube placement 10/27

## 2022-10-26 NOTE — ASSESSMENT & PLAN NOTE
Await pleural fluid analysis.  A chest tube was placed 10/27 by surgery.  Over 1 L of bloody fluid removed.

## 2022-10-26 NOTE — ASSESSMENT & PLAN NOTE
Recent right posterior tibial vein DVT at another facility  CTA chest negative PE 10/25...Given new event with afib with RVR upon standing and was off heparin,   Repeat CTA negative for PE, positive for loculated pleural effusion and possible lung abscess  General surgery following

## 2022-10-26 NOTE — THERAPY
Missed Therapy     Patient Name: Migue Cervantes  Age:  67 y.o., Sex:  male  Medical Record #: [id number]  Today's Date: 10/26/2022    Attempted to see patient for clinical swallow evaluation. Per ARGENTINA Bonner, patient NPO for potential procedure. Given recent admission for RLL PNA and known COPD, please keep patient NPO s/p procedure pending swallow evaluation. Discussed with RN.        10/26/22 9662   Treatment Variance   Reason For Missed Therapy Medical - Patient not Able To Participate   Initial Contact Note    Initial Contact Note  Order Received and Verified, Speech Therapy Evaluation in Progress with Full Report to Follow.   Interdisciplinary Plan of Care Collaboration   IDT Collaboration with  Nursing

## 2022-10-26 NOTE — RESPIRATORY CARE
COPD EDUCATION by COPD CLINICAL EDUCATOR  10/26/2022  at  2:38 PM by Sarai Flores RRT     Patient and his family were interviewed by COPD team.  Patient unable to participate in full program.  He declined our informational packet about Lung Disease.  He states he has had Emphysema and heart failure for a long time and has a good understanding of his challenge and mediation use. We did go over his current respiratory mediations and proper use.  An Action Plan was completed below.  He transferred here from [facility] for further workup.  PFT -bedside ordered and not completed due to exacerbation of COPD. [facility]. No [facility] available.    COPD Screen  COPD Risk Screening  Do you have a history of COPD?: Yes  Do you have a Pulmonologist?: Yes ([facility])    COPD Assessment  COPD Clinical Specialists ONLY  COPD Education Initiated: Yes--Short Intervention (Transfer from [place];met with pt and family; minimal info in chart; Action Plan updated; frustrated with his current health challenges; [facility] -lives in [place]; IP Pulmonary notified of [facility] utilization)  DME Company: [employer]  DME Equipment Type: Oxygen 4 lpm per pt  Physician Name: [facility]  Pulmonologist Name: [facility] with pulmonary per Pt  Referrals Initiated: Yes  Pulmonary Rehab: Yes (by admitting team)  Smoking Cessation: No (quit 5 years)  Home Health Care:  (TBD at this encounter placed on revisit list)  Geriatric Specialty Group: N/A  Dispatch Health: N/A (out of area)  Is this a COPD exacerbation patient?: Yes  $ Demo/Eval of SVN's, MDI's and Aerosols:  (refused spacer)    PFT Results  Pulmonary Function Testing:  (testing at [facility] no Data available)  No results found for: PFT    Meds to Beds  Would the patient like to opt in for Bedside Medication Delivery at Discharge?: Yes, interested     MY COPD ACTION PLAN     It is recommended that patients and physicians /healthcare providers complete this  "action plan together. This plan should be discussed at each physician visit and updated as needed.    The green, yellow and red zones show groups of symptoms of COPD. This list of symptoms is not comprehensive, and you may experience other symptoms. In the \"Actions\" column, your healthcare provider has recommended actions for you to take based on your symptoms.    Patient Name: Migue Cervantes   YOB: 1955   Last Updated on:     Green Zone:  I am doing well today Actions     Usual activitiy and exercise level   Take daily medications     Usual amounts of cough and phlegm/mucus   Use oxygen as prescribed     Sleep well at night   Continue regular exercise/diet plan     Appetite is good   At all times avoid cigarette smoke, inhaled irritants     Daily Medications (these medications are taken every day):   Fluticasone and Salmeterol (Wixela)  Tiotropium Bromide Monohydrate (Spiriva) 1 Puff  2 Puffs Twice daily  Once daily     Additional Information:  Remember to Rinse mouth after using    This medicine is similar to your prior Advair ([facility] formulary)    Yellow Zone:  I am having a bad day or a COPD flare Actions     More breathless than usual   Continue daily medications     I have less energy for my daily activities   Use quick relief inhaler as ordered     Increased or thicker phlegm/mucus   Use oxygen as prescribed     Using quick relief inhaler/nebulizer more often   Get plenty of rest     Swelling of ankles more than usual   Use pursed lip breathing     More coughing than usual   At all times avoid cigarette smoke, inhaled irritants     I feel like I have a \"chest cold\"     Poor sleep and my symptoms woke me up     My appetite is not good     My medicine is not helping      Call provider immediately if symptoms don’t improve     Continue daily medications, add rescue medications:   Albuterol 2 Puffs Every 6 hours PRN       Medications to be used during a flare up, (as Discussed with Provider):       "        Red Zone:  I need urgent medical care Actions     Severe shortness of breath even at rest   Call 911 or seek medical care immediately     Not able to do any activity because of breathing      Fever or shaking chills      Feeling confused or very drowsy       Chest pains      Coughing up blood

## 2022-10-26 NOTE — CODE DOCUMENTATION
Dr. Amador wants an additional 150mg bolus of amiodarone prior to starting gtt. Ordered. Will transfer to [facility] first.     Dr. Amador updating Dr. Hogue and Dr. Salazar of patient transfer.

## 2022-10-26 NOTE — PROGRESS NOTES
"1425- This RN was getting pt ready to transport to CT and pt requested to urinate first. Pt given privacy.    1430- Pt family shouted out they need help. This RN ran in room. Pt reported, \"My pacer shocked me!\"     Crash cart pulled in room and rapid team called, MD paged.     On monitor pt has HR of 200 Afib RVR.    1435- Rapid team arrived.    1530- Pt being transferred to TICU. Report called to Winnie ELAINE. Pt family at bedside and aware.  "

## 2022-10-26 NOTE — H&P
Hospital Medicine History & Physical Note    Date of Service  10/26/2022    Primary Care Physician  Pio Rogers M.D.    Consultants  Surgery (Dr. Hogue)  Cardiology (Dr. Salazar)  ICU (Dr. Sheffield) - to [facility]    Code Status  Full Code    Chief Complaint  No chief complaint on file.  Shortness of breath, pleuritic chest pain, cough    History of Presenting Illness  Migue Cervantes is a 67 y.o. male with history of COPD dependent on 4.5 L,CAD s/p PCI, HFrEF 30% s/p ICD, DM, chronic pain syndrome dependent on narcotic who presented 10/26/2022 [place] with complaint of shortness of breath, pleuritic chest pain, subsequently found to have loculated right-sided pleural effusion and transferred to [facility] as direct admit for surgery evaluation.  Patient had complicated recent hospitalization from 10/17/2022 to 10/23/2022 for RLL pneumonia and COPD exacerbation, he was treated with Zosyn per chart review and was discharged on 10/23/2022.  Unfortunately, he was rehospitalized on 10/23/2022 to ICU, required BiPAP treatment, WBC 55k, 4.2 L thoracocentesis removed from right pleural cavity.  Additionally, he was found to have acute right posterior tibial vein DVT and was started on heparin drip.     He had CTA chest completed due to complaints of shortness of breath and pleuritic chest pain, noted to have no PE but however revealed right middle and lower lobe pneumonias with parapneumonic effusion with loculation, possibility of an underlying or obstructing mass is difficult to exclude.  Ultimately, patient was referred to [facility] as direct admit for surgery evaluation.    Patient was seen by me at [facility] arrival, admitted to medicine service.  I personally consulted surgery, formal evaluation to follow.  Repeat CT chest with contrast ordered as requested.    I discussed the plan of care with patient, bedside RN, and general surgery.    Review of Systems  Review of Systems   Constitutional:  Positive for chills, fever and  malaise/fatigue.   HENT:  Negative for hearing loss and tinnitus.    Eyes:  Negative for blurred vision and double vision.   Respiratory:  Positive for cough and shortness of breath.    Cardiovascular:  Positive for chest pain, palpitations and leg swelling.   Gastrointestinal:  Negative for abdominal pain, heartburn, nausea and vomiting.   Genitourinary:  Negative for dysuria and flank pain.   Musculoskeletal:  Negative for joint pain and myalgias.   Skin:  Negative for itching and rash.   Neurological:  Positive for weakness. Negative for dizziness, speech change, focal weakness and headaches.   Endo/Heme/Allergies:  Negative for environmental allergies. Does not bruise/bleed easily.   Psychiatric/Behavioral:  Negative for depression and substance abuse.    All other systems reviewed and are negative.    Past Medical History   has a past medical history of Coronary artery disease due to lipid rich plaque, Dyslipidemia, Essential hypertension, Former tobacco use - quit Sep 2017 in setting of STEMI, Heart burn, History of ST elevation myocardial infarction (STEMI) LAD September 2017, Indigestion, Pain (09-20-12), and Status post insertion of drug-eluting stent into left anterior descending artery.    Surgical History   has a past surgical history that includes cervical disk and fusion anterior (1997/2010); lumbar decompression (9/25/2012); fusion, spine, lumbar, plif (9/25/2012); lumbar decompression (9/25/2012); and beto by laparoscopy (N/A, 12/28/2015).     Family History  family history includes Heart Attack in his father, paternal grandfather, and paternal grandmother; Stroke in his father.   Family history reviewed with patient. There is family history that is pertinent to the chief complaint.     Social History   reports that he quit smoking about 5 years ago. His smoking use included cigarettes. He has a 70.00 pack-year smoking history. He has never used smokeless tobacco. He reports that he does not drink  alcohol and does not use drugs.    Allergies  Allergies   Allergen Reactions    Nkda [No Known Drug Allergy]        Medications  Prior to Admission Medications   Prescriptions Last Dose Informant Patient Reported? Taking?   Fluticasone Furoate 50 MCG/ACT AEROSOL POWDER, BREATH ACTIVATED   Yes No   Sig: Inhale.   HYDROcodone-acetaminophen (NORCO) 7.5-325 MG per tablet   Yes No   Sig: Take 1 Tablet by mouth every 6 hours as needed.   albuterol 108 (90 Base) MCG/ACT Aero Soln inhalation aerosol   Yes No   Sig: Inhale 2 Puffs every 6 hours as needed for Shortness of Breath.   aspirin EC 81 MG EC tablet   Yes No   Sig: Take 1 Tab by mouth every day.   atorvastatin (LIPITOR) 40 MG Tab   No No   Sig: Take 1 Tab by mouth every bedtime.   carvedilol (COREG) 25 MG Tab   No No   Sig: Take 1 Tab by mouth 2 times a day, with meals.   clotrimazole (LOTRIMIN) 1 % Cream   Yes No   Sig: Apply 1 Application topically 2 times a day.   cyclobenzaprine (FLEXERIL) 10 mg Tab   Yes No   Sig: Take 10 mg by mouth 3 times a day as needed.   docusate calcium (SURFAK) 240 MG Cap   Yes No   Sig: Take 240 mg by mouth 2 times a day.   ferrous sulfate 325 (65 Fe) MG tablet   Yes No   Sig: Take 325 mg by mouth every day.   lisinopril (PRINIVIL) 2.5 MG Tab   No No   Sig: Take 1 Tab by mouth every day.   omeprazole (PRILOSEC) 40 MG delayed-release capsule   Yes No   Sig: Take 40 mg by mouth every day.   spironolactone (ALDACTONE) 25 MG Tab   No No   Sig: Take 1 Tab by mouth every day.   tiotropium (SPIRIVA RESPIMAT) 2.5 mcg/Act Aero Soln   Yes No   Sig: Inhale 5 mcg every day.   traMADol (ULTRAM) 50 MG Tab   Yes No   Sig: Take 50 mg by mouth every four hours as needed.   traZODone (DESYREL) 50 MG Tab   Yes No   Sig: Take 100 mg by mouth every evening.      Facility-Administered Medications: None       Physical Exam  Temp:  [36.3 °C (97.3 °F)] 36.3 °C (97.3 °F)  Pulse:  [100-183] 166  Resp:  [16-20] 18  BP: (101-114)/(75-90) 109/88  SpO2:  [93 %-96 %]  95 %                          Physical Exam  Vitals and nursing note reviewed.   Constitutional:       General: He is not in acute distress.     Appearance: He is ill-appearing.   HENT:      Head: Normocephalic and atraumatic.      Nose: Nose normal.      Mouth/Throat:      Mouth: Mucous membranes are moist.      Pharynx: Oropharynx is clear.   Eyes:      General: No scleral icterus.     Extraocular Movements: Extraocular movements intact.   Cardiovascular:      Rate and Rhythm: Tachycardia present. Rhythm irregular.      Pulses: Normal pulses.      Heart sounds:     No friction rub.   Pulmonary:      Breath sounds: No stridor. Wheezing and rales present.      Comments: Right basilar ronchi and crackles  Chest:      Chest wall: No tenderness.   Abdominal:      General: There is distension.      Tenderness: There is no abdominal tenderness. There is no guarding or rebound.   Musculoskeletal:         General: No tenderness.      Cervical back: Neck supple. No tenderness.      Comments: +2pitting edema of LE   Skin:     General: Skin is warm and dry.      Capillary Refill: Capillary refill takes less than 2 seconds.   Neurological:      General: No focal deficit present.      Mental Status: He is alert and oriented to person, place, and time.   Psychiatric:         Mood and Affect: Mood normal.       Laboratory:  Recent Labs     10/26/22  1229   WBC 54.0*   RBC 4.81   HEMOGLOBIN 14.0   HEMATOCRIT 41.4*   MCV 86.1   MCH 29.1   MCHC 33.8   RDW 40.6   PLATELETCT 348   MPV 9.4     Recent Labs     10/26/22  1229   SODIUM 126*   POTASSIUM 3.9   CHLORIDE 85*   CO2 28   GLUCOSE 191*   BUN 48*   CREATININE 1.15   CALCIUM 8.8     Recent Labs     10/26/22  1229   ALTSGPT 20   ASTSGOT 16   ALKPHOSPHAT 62   TBILIRUBIN 0.6   GLUCOSE 191*     Recent Labs     10/26/22  1229   INR 1.07     Recent Labs     10/26/22  1229   NTPROBNP 516*         Recent Labs     10/26/22  1229   TROPONINT 22*       Imaging:  DX-CHEST-PORTABLE (1 VIEW)    Final Result      1.  Small to moderate right-sided pleural effusion with associated basilar atelectasis and/or consolidation.   2.  Left basilar atelectasis.   3.  Mild enlargement of the cardiomediastinal silhouette.      EC-ECHOCARDIOGRAM COMPLETE W/O CONT    (Results Pending)   CT-CHEST (THORAX) WITH    (Results Pending)             X-Ray:  I have personally reviewed the images and compared with prior images.  EKG:  I have personally reviewed the images and compared with prior images.    Assessment/Plan:  Justification for Admission Status  I anticipate this patient will require at least two midnights hospitalization, therefore appropriate for inpatient status.      * Loculated pleural effusion  Assessment & Plan  CTA chest at [place] 10/25/22: no PE, right middle and lower lobe pneumonias with parapneumonic effusion with loculation, possibility of an underlying or obstructing mass is difficult to exclude    Had 4.2L right sided thoracocentesis at [place] before    Repeat CT chest with contrast  F/u echo    F/u Sputum Cx, BCx  Abx: vancomycin + zoysn  Surgery f/u appreciated    Parapneumonic effusion  Assessment & Plan  Right-sided, as noted above  Diuresis  May consider US-thoracocentesis if no surgical intervention    Severe sepsis (HCC)  Assessment & Plan  This is Severe Sepsis Present on admission  SIRS criteria identified on my evaluation include: Tachycardia, with heart rate greater than 90 BPM, Tachypnea, with respirations greater than 20 per minute, Leukocytosis, with WBC greater than 12,000 and Bandemia, greater than 10% bands  Clinical indicators of end organ dysfunction include Systolic blood pressure (SBP) <90 mmHg or mean arterial pressure <65 mmHg  Source is pulm  Sepsis protocol initiated  Cautious with IVF given EF 30%  Reassessment: I have reassessed the patient's hemodynamic status    Chronic HFrEF (heart failure with reduced ejection fraction) (HCC)  Assessment & Plan  Repeat echo  Optimize HF  meds    CAD (coronary artery disease)  Assessment & Plan  S/p remote PCI  ASA/statin/BB    V-tach  Assessment & Plan  AICD shock 10/26 upon standing to go for CT  Started on amiodarone  Device interrogation  Cardiology f/u appreciated    Atrial fibrillation with RVR (HCC)  Assessment & Plan  afib with RVR upon standing up to go for CT    Rapid response:  -metoprolol 5mg IV x1  -amiodarone 150mg bolux x2, continued on drip  -magnesium sulfate 2mg  -was on heparin gtt for RLE DVT, resume after invasive intervention    Rate: BB  -PRN IV BB  Rhythm: amiodarone gtt  Anticoagulation: resume after invasive intervention    Chronic pain syndrome  Assessment & Plan  H/o cervical fusion and back surgeries  Continue home tramadol and NOrco    Acute on chronic respiratory failure with hypoxia (HCC)  Assessment & Plan  Dependent on 4 L at home  Currently on 6 L-wean off as tolerated  Nebs, pulm toilet, PEP  Antibiotic as above  Diuresis  No PE seen on CTA chest 10/25, he does RLE DVT --resume heparin or DOAC if no invasive intervention  Follow-up echo    Acute DVT (deep venous thrombosis) (HCC)  Assessment & Plan  Recent right posterior tibial vein DVT at another facility  CTA chest negative PE 10/25  Was on heparin gtt -- hold for now until no further invasive intervention, resume when appropriate    GERD (gastroesophageal reflux disease)- (present on admission)  Assessment & Plan  PPI    ACP (advance care planning)  Assessment & Plan  D/w pt -- FULL code    ICD (implantable cardioverter-defibrillator), [employer]- (present on admission)  Assessment & Plan  Per h/o    Former tobacco use - quit Sep 2017 in setting of STEMI- (present on admission)  Assessment & Plan  Quit smoking      VTE prophylaxis: SCDs, resume heparin gtt/DOAC when appropriate    After being admitted, patient rapid response for VT shock, atrial fibrillation RVR.  He has been started on amiodarone drip.  ICU was consulted for IMCU upgrade.  Cardiology was also  consulted for device interrogation and assistance with management of VT shock.    Critical care time: 50 minutes spent in aggressive medical management, chart review, coordinating care with patient/RN/rapid response team/pharmacy/consulting providers.

## 2022-10-26 NOTE — CODE DOCUMENTATION
Rapid response called for new tachycardia. Patient reports feeling AICD shock prior to rapid, and once upon rapid team arrival. Denies dizziness. MD paged by primary RN prior. EKG at bedside.   That is not something insurance covers  It is more used in research in academic setting  I'm not sure where she would go for this

## 2022-10-26 NOTE — PROGRESS NOTES
[facility] TRIAGE OFFICER DIRECT ADMISSION REPORT  Transferring facility: [facility]  Transferring physician: Dr Wylie  Transferring facility/physician contact number:   Chief complaint: Pleuritic chest pain  Pertinent history & patient course: 67-year-old male with history of oxygen dependent COPD, on 4 L, MI, stents placement, CHF with EF 30%, 2 diabetes, chronic pain.  He was admitted at outside facility from 10/17-10/23 with right lower lobe pneumonia and COPD exacerbation was treated with Zosyn and discharged on 10/23.  He returned to the hospital on 10/23 with worsening of chest pain and shortness of breath.  WBC 55K chest x-ray showed worsening of right-sided pleural effusion.  Thoracentesis was done with removal of 4.2 L from the right pleural cavity.  Patient required admission to ICU and BiPAP treatment.  D-dimer was elevated, but CT was postponed as patient was not able to lie flat.  Ultrasound of lower extremities showed right posterior tibial vein DVT and patient was started on IV heparin.  Subsequently respiratory status improved, patient undergone CTA of chest, as it was negative for PE, but did show worsening of right lower lobe and middle lobe pneumonia and loculated right-sided pleural effusion.  Patient has been treated with Levaquin and vancomycin.  WBC came down from 55 k to 42K.  Sputum culture growing gram-positive cocci.  He is currently on 4 L oxygen which is his baseline.  Requested transfer to this hospital for thoracic surgery consult for loculated pleural effusion.  Pertinent imaging & lab results:   Code Status: full per transferring provider, I personally verified with the transferring provider patient's code status and the transferring provider has confirmed this with the patient.  Further work up or recommendations per triage officer prior to transfer:   Consultants called prior to transfer and pertinent input from consultants:   Patient accepted for transfer:  Yes  Consultants to be called upon arrival: thoracic surgery  Admission status: Inpatient.   Floor requested: med  If ICU transfer, name of intensivist case discussed with and pertinent input from critical care: n/a    Please inform the triage officer upon arrival of the patient to [facility] for assignment of a hospitalist to perform admission.     For any question or concerns regarding the care of this patient, please reach out to the assigned hospitalist.

## 2022-10-26 NOTE — CONSULTS
DATE OF CONSULTATION:  10/26/2022     REFERRING PHYSICIAN:   oNe Amador M.D.     CONSULTING PHYSICIAN:  Koby Hogue M.D.     REASON FOR CONSULTATION:  I have been asked by  to see the patient in surgical consultation for evaluation of loculated pleural effusion.    HISTORY OF PRESENT ILLNESS: The patient is a 67 year-old White elderly man who was transferred from [facility] with a loculated right pleural effusion. He has a history of oxygen dependent COPD, on 4 L at baseline; MI, status-post PCI; CHF with EF 30%;  type II diabetes; chronic pain.  He was admitted at [facility] from 10/17-10/23 with a right lower lobe pneumonia and COPD exacerbation. He was treated with Zosyn and discharged on 10/23.  However he returned to the hospital with worsening chest pain and dyspnea.  Chest x-ray showed worsening of right-sided pleural effusion. He underwent thoracentesis their and was started on therapeutic anticoagulation for a right tibial DVT.    PAST MEDICAL HISTORY:  has a past medical history of Coronary artery disease due to lipid rich plaque, Dyslipidemia, Essential hypertension, Former tobacco use - quit Sep 2017 in setting of STEMI, Heart burn, History of ST elevation myocardial infarction (STEMI) LAD September 2017, Indigestion, Pain (09-20-12), and Status post insertion of drug-eluting stent into left anterior descending artery.    PAST SURGICAL HISTORY:  has a past surgical history that includes cervical disk and fusion anterior (1997/2010); lumbar decompression (9/25/2012); fusion, spine, lumbar, plif (9/25/2012); lumbar decompression (9/25/2012); and beto by laparoscopy (N/A, 12/28/2015).    ALLERGIES:   Allergies   Allergen Reactions    Nkda [No Known Drug Allergy]        CURRENT MEDICATIONS:    Home Medications       Reviewed by Niyah Wiseman PharmD (Pharmacist) on 10/26/22 at 1342  Med List Status: Complete     Medication Last Dose Status   albuterol 108 (90 Base)  MCG/ACT Aero Soln inhalation aerosol PRN Active   amoxicillin-clavulanate (AUGMENTIN) 875-125 MG Tab 10/19/2022 Active   aspirin EC 81 MG EC tablet UNK Active   atorvastatin (LIPITOR) 40 MG Tab UNK Active   azithromycin (ZITHROMAX) 250 MG Tab 10/16/2022 Active   carvedilol (COREG) 25 MG Tab UNK Active   cyanocobalamin (VITAMIN B-12) 500 MCG Tab UNK Active   ferrous sulfate 325 (65 Fe) MG tablet UNK Active   fluticasone-salmeterol (ADVAIR DISKUS) 250-50 MCG/ACT AEROSOL POWDER, BREATH ACTIVATED UNK Active   furosemide (LASIX) 40 MG Tab UNK Active   lisinopril (PRINIVIL) 2.5 MG Tab UNK Active   metFORMIN (GLUCOPHAGE) 500 MG Tab UNK Active   omeprazole (PRILOSEC) 40 MG delayed-release capsule UNK Active   oxyCODONE-acetaminophen (PERCOCET-10)  MG Tab UNK Active   pregabalin (LYRICA) 25 MG Cap UNK Active   spironolactone (ALDACTONE) 25 MG Tab UNK Active   tiotropium (SPIRIVA RESPIMAT) 2.5 mcg/Act Aero Soln UNK Active   traZODone (DESYREL) 50 MG Tab UNK Active   vitamin D3 (CHOLECALCIFEROL) 1000 Unit (25 mcg) Tab UNK Active                    FAMILY HISTORY: family history includes Heart Attack in his father, paternal grandfather, and paternal grandmother; Stroke in his father.    SOCIAL HISTORY:  reports that he quit smoking about 5 years ago. His smoking use included cigarettes. He has a 70.00 pack-year smoking history. He has never used smokeless tobacco. He reports that he does not drink alcohol and does not use drugs.    REVIEW OF SYSTEMS: Review of systems is remarkable for the following fever, chills, malaise, cough, chest pain, palpitations, leg swelling. The remainder of the comprehensive ROS is negative with the exception of the aforementioned HPI, PMH, and PSH bullets in accordance with CMS guideline.    PHYSICAL EXAMINATION:    Physical Exam  Vitals and nursing note reviewed.   Constitutional:       Appearance: He is ill-appearing.   HENT:      Head: Normocephalic and atraumatic.      Right Ear: External  ear normal.      Left Ear: External ear normal.      Nose:      Comments: Nasal cannula in place     Mouth/Throat:      Mouth: Mucous membranes are moist.      Pharynx: Oropharynx is clear.   Eyes:      General: No scleral icterus.     Pupils: Pupils are equal, round, and reactive to light.   Cardiovascular:      Rate and Rhythm: Tachycardia present.   Pulmonary:      Effort: Pulmonary effort is normal. No respiratory distress.      Breath sounds: Examination of the right-middle field reveals decreased breath sounds. Examination of the right-lower field reveals decreased breath sounds. Decreased breath sounds present.   Chest:      Chest wall: No deformity, tenderness or crepitus.   Abdominal:      General: There is no distension.      Palpations: Abdomen is soft.      Tenderness: There is no abdominal tenderness. There is no guarding or rebound.   Genitourinary:     Comments: Deferred  Musculoskeletal:         General: No tenderness or deformity.      Cervical back: Neck supple.      Right lower leg: Edema present.      Left lower leg: Edema present.   Skin:     General: Skin is warm and dry.      Capillary Refill: Capillary refill takes less than 2 seconds.      Coloration: Skin is not jaundiced.   Neurological:      General: No focal deficit present.      Mental Status: He is alert and oriented to person, place, and time.   Psychiatric:         Mood and Affect: Mood normal.         Behavior: Behavior normal.       LABORATORY VALUES:   Recent Labs     10/26/22  1229   WBC 54.0*   RBC 4.81   HEMOGLOBIN 14.0   HEMATOCRIT 41.4*   MCV 86.1   MCH 29.1   MCHC 33.8   RDW 40.6   PLATELETCT 348   MPV 9.4     Recent Labs     10/26/22  1229   SODIUM 126*   POTASSIUM 3.9   CHLORIDE 85*   CO2 28   GLUCOSE 191*   BUN 48*   CREATININE 1.15   CALCIUM 8.8     Recent Labs     10/26/22  1229   ASTSGOT 16   ALTSGPT 20   TBILIRUBIN 0.6   ALKPHOSPHAT 62   GLOBULIN 2.4   INR 1.07     Recent Labs     10/26/22  1229   INR 1.07         IMAGING:   DX-CHEST-PORTABLE (1 VIEW)   Final Result      1.  Small to moderate right-sided pleural effusion with associated basilar atelectasis and/or consolidation.   2.  Left basilar atelectasis.   3.  Mild enlargement of the cardiomediastinal silhouette.      CT-CTA CHEST PULMONARY ARTERY W/ RECONS    (Results Pending)   EC-ECHOCARDIOGRAM COMPLETE W/O CONT    (Results Pending)       ASSESSMENT AND PLAN:     * Loculated pleural effusion  Assessment & Plan  No imaging available for review from outside facility, recommend repeat CT chest with IV contrast.  Continue IV antibiotics as appropriate  Further recommendations pending imagining results         ____________________________________     Koby Hogue M.D.    DD: 10/26/2022  12:08 PM    ACS NSQIP Surgical Risk Calculator

## 2022-10-26 NOTE — ASSESSMENT & PLAN NOTE
Dependent on 4 L at home  Advanced COPD with extensive tobacco abuse history  Nebs, pulm toilet, PEP  Diuresis as tolerates  No PE seen on CTA chest 10/25, he does RLE DVT   Restarted heparin   Follow-up echo with poor visualization of left side  10/27/22 Echo:  CONCLUSIONS  Left ventricle not adequately seen despite use of echocardiographic   contrast.  Unable to accurately assess LVEF or ventricular structural parameters.  LV appears grossly reduced in function compared to prior however.  The right ventricle is normal in size and systolic function.  Normal inferior vena cava size and inspiratory collapse.

## 2022-10-26 NOTE — ASSESSMENT & PLAN NOTE
AICD shock 10/26 upon standing to go for CT  Started on amiodarone  Device interrogation  Cardiology f/u appreciated

## 2022-10-26 NOTE — PROGRESS NOTES
Rapid response note:    I was paged by rapid response team for having AICD go off and patient's heart rate in 150s to 170s.  Patient was admitted as direct transfer earlier today for right-sided loculated pleural effusion.  There was CT chest ordered.  As patient was getting up to go to CT chest, patient reported having a jolt, felt a shock.  EKG revealed atrial fibrillation with RVR.    Impression:  Atrial fibrillation RVR  AICD shock    Plan:  -Metoprolol 5 mg IV x1 --persistent uncontrolled RVR  -Amiodarone 150 mg bolus x2, continued on drip  -Magnesium sulfate 2 g IV  -Discussed with ICU attending for IMCU transfer, agreeable for transfer  -Discussed with surgery, okay with CXR for now, may consider CT chest if patient able to tolerate  -Consulted cardiology, device interrogation to pursue, formal consult to follow

## 2022-10-26 NOTE — ASSESSMENT & PLAN NOTE
A. fib RVR, currently improved with medication regimen  He required a rapid response and amiodarone bolus then drip with conversion to sinus  Monitor on telemetry  Continue with amiodarone, digoxin, carvedilol  Cardiology consulting  Possible all related to right pleural effusion and acute on chronic respiratory failure

## 2022-10-26 NOTE — PROGRESS NOTES
Pharmacy Vancomycin Kinetics Note for 10/26/2022     67 y.o. male on Vancomycin day # 3     Vancomycin Indication (AUC Dosing): S. aureus pneumonia    Provider specified end date: 11/02/22    Active Antibiotics (From admission, onward)      Ordered     Ordering Provider       Wed Oct 26, 2022 12:07 PM    10/26/22 1207  vancomycin (VANCOCIN) 1,250 mg in  mL IVPB  (vancomycin (VANCOCIN) IV (LD + Maintenance))  EVERY 12 HOURS        Note to Pharmacy: Per P&T Kinetics Protocol    Noe Amador M.D.       Wed Oct 26, 2022 11:42 AM    10/26/22 1142  MD Alert...Vancomycin per Pharmacy  PHARMACY TO DOSE        Question:  Indication(s) for vancomycin?  Answer:  Pneumonia    Noe Amador M.D.    10/26/22 1142  piperacillin-tazobactam (Zosyn) 3.375 g in  mL IVPB  (piperacillin-tazobactam (ZOSYN) IV (Extended-infusion) PANEL )  ONCE        See Hyperspace for full Linked Orders Report.    Noe Amador M.D.    10/26/22 1142  piperacillin-tazobactam (Zosyn) 3.375 g in  mL IVPB  (piperacillin-tazobactam (ZOSYN) IV (Extended-infusion) PANEL )  EVERY 8 HOURS        See Hyperspace for full Linked Orders Report.    Noe Amador M.D.            Dosing Weight: 117 kg (257 lb 15 oz)      Admission History: Admitted on 10/26/2022 for Loculated plural effusion  Pertinent history: Patient transferred from [facility] for thoracic surgery consult. Patient with a PMH of COPD on 4 L O2 baseline, CHF, DM, and MI with stents placed. Patient admitted and treated for PNA with Zosyn then discharged. Patient returned to the ER and was placed on Levaquin/Vanco. Per chart review, patient growing GPC on sputum culture from OSH. Concerns for loculated pleural effusion so surgery consulted. Vancomycin continued at [facility].    Allergies:     Nkda [no known drug allergy]     Pertinent cultures to date:     Results       Procedure Component Value Units Date/Time    Urinalysis [[id number]] Collected: 10/26/22 1200    Order Status: Sent  This note was copied from the mother's chart.  Follow up visit. Weight loss is now at 10%. Asked MOB if she needed help with latching. MOB complains of pinching during latch. She had just finished feeding. Discussed importance of obtaining a deeper latch, to avoid further nipple soreness and pain, and to encourage infant to effectively remove milk. At this time, implemented feeding plan.     MOB to attempt to breastfeed first.  Then supplement with donor breast milk per [place] guidelines.  Then to use HG pump after feeding for 15 minutes.    Parents had no other questions or concerns regarding breastfeeding at this time.     Encouraged to call for support as needed.   "Specimen: Urine, Clean Catch     Urine Culture [[id number]] Collected: 10/26/22 1200    Order Status: Sent Specimen: Urine, Clean Catch     Blood Culture [[id number]]     Order Status: No result Specimen: Blood from Peripheral     Blood Culture [[id number]]     Order Status: No result Specimen: Blood from Peripheral     Culture Respiratory W/ GRM STN [[id number]]     Order Status: No result Specimen: Respirate from Sputum             Labs:     CrCl cannot be calculated (Patient's most recent lab result is older than the maximum 7 days allowed.).  No results for input(s): WBC, NEUTSPOLYS, BANDSSTABS in the last 72 hours.  No results for input(s): BUN, CREATININE, ALBUMIN in the last 72 hours.  No intake or output data in the 24 hours ending 10/26/22 1207   /83   Pulse 100   Temp 36.3 °C (97.3 °F) (Temporal)   Resp 18   Ht 1.803 m (5' 11\")   Wt 114 kg (251 lb 5.2 oz)   SpO2 96%  Temp (24hrs), Av.3 °C (97.3 °F), Min:36.3 °C (97.3 °F), Max:36.3 °C (97.3 °F)      List concerns for Vancomycin clearance:     Obesity;CHF    Pharmacokinetics:    AUC kinetics:   Ke (hr ^-1): 0.104 hr^-1  Half life: 6.66 hr  Clearance: 4.919  Estimated TDD: 2459.5  Estimated Dose: 892  Estimated interval: 8.7      A/P:     -  Vancomycin dose: start lower dose of 1250 mg IV q12h starting this afternoon    -  Next vancomycin level(s):    ~ 10/27 or 10/28    -  Predicted vancomycin AUC from initial AUC test calculator: 508 mg·hr/L    -  Comments: vancomycin resumed from OSH. Renal function from chart review appears about baseline (0.92 mg/dL this AM at OSH). Will plan on decreasing dose to 1250 mg IV q12h (previously on 1500 mg IV q12h) and possibly collect levels within the next day or two. Will follow up on sputum culture from OSH and plan for surgery at [facility].    Thank you!    Niyah Wiseman, PharmD, BCPS    "

## 2022-10-26 NOTE — ASSESSMENT & PLAN NOTE
CTA chest at [place] 10/25/22: no PE, right middle and lower lobe pneumonias with parapneumonic effusion with loculation, possibility of an underlying or obstructing mass is difficult to exclude.   S/p chest tube placement  Follow-up CT noted, surgery following  Had 2.5 liters from right sided thoracocentesis at [place] (daughters showed me a picture of the bloody appearing pleural fluid in the vacuum bottles), apparently the fluid was not sent for for cytology/pathology, repeat attempts resulted in only negative culture  F/u echo noted  Continue antibiotics, adjust and follow-up cultures  Follow-up on pathology from pleural fluid, is pending  The patient is headed for decortication with surgery  Will likely be on mechanical ventilation after or, discussed with intensivist

## 2022-10-27 PROBLEM — B37.0 ORAL CANDIDIASIS: Status: ACTIVE | Noted: 2022-01-01

## 2022-10-27 PROBLEM — E87.20 LACTIC ACIDOSIS: Status: ACTIVE | Noted: 2022-01-01

## 2022-10-27 PROBLEM — E87.1 HYPONATREMIA: Status: ACTIVE | Noted: 2022-01-01

## 2022-10-27 NOTE — PROCEDURES
DATE OF OPERATION:  10/27/2022    PREOPERATIVE DIAGNOSIS: Right  pleural effusion .    POSTOPERATIVE DIAGNOSIS: Right  pleural effusion .     PROCEDURE PERFORMED: Right tube thoracostomy.     SURGEON:    Koby Hogue M.D.    ANESTHESIA:   local anesthesia and parenteral analgesia.    INDICATIONS: The patient is a 67 year-old man who was transferred to [facility] with a right  loculated pleural effusion . Unfortunately his imaging has not been able to be uploaded and he is only able to lay flat for brief periods due to symptoms from his effusion prohibiting CT scan or imagining-guided pigtail placement. A right tube thoracostomy was performed.    FINDINGS:      More than 1350 mL of serosanguinous output from the tube initially, good tidaling with respiration, no air-leak.    WOUND CLASSIFICATION:  Class Infection present at the time of surgery (PATOS).    SPECIMEN:     Pleural fluid culture and cytology.    ESTIMATED BLOOD LOSS:  10 mL.    PROCEDURE: Following informed consent, the patient was properly identified and optimally positioned. An anesthetic consisting of local anesthesia and parenteral analgesia was administered. The right chest wall was widely prepped with ChloraPrep® and draped into a sterile field.      Local anesthetic was used to infiltrate the chest tube site. Multiple intercostal nerve blocks were placed above and below the chest tube site dermatome. A 1-cm transverse incision was made in the right 5th intercostal space, anterior axillary line and the subcutaneous tissue spread bluntly. The thoracic cavity was accessed bluntly over the rib and a 28 Fr chest tube placed in a posterior apical orientation and secured to the skin with a 0 Ethibond vertical mattress suture.     The chest tube was connected to closed suction drainage and a sterile dressing was applied. The patient tolerated the procedure well. There were no apparent complications.       RECOMMENDATIONS: Ok to start heparin drip  without bolus two-hours after chest tube placement so long as output low over that time.     ____________________________________     Koby Hogue M.D.    DD: 10/27/2022  11:55 AM

## 2022-10-27 NOTE — PROGRESS NOTES
Multiple PIVs attempted by ultrasound RN, no access. Medication zosyn held due to incompatability. MD aware. MD and intensivist at bedside for PIV access. None found. Pt unable to lie flat. MD consulting pharmacy.

## 2022-10-27 NOTE — ASSESSMENT & PLAN NOTE
Small to moderate right-sided pleural effusion on CT from referring facility  10/27 Chest tube placed at bedside with evacuation of 1.3L of serous fluid, Pleural fluid cultures negative  10/29 CT: Moderate size loculated pleural fluid collection, with possible RIGHT lower lobe lung abscess.  Persistent leukocytosis.  10/30 Right posterolateral thoracotomy, partial decortication. Minimal decortication of right upper lobe and right middle lobe surface.  11/1 Multi-modal pain regimen instituted, CT #2 water sealed  12/2 CT #1 water sealed  Pleural cultures negative so far  Biopsies with non-small cell carcinoma  Daily chest x-ray  [facility]

## 2022-10-27 NOTE — PROGRESS NOTES
Hospital Medicine Daily Progress Note    Date of Service  10/27/2022    Chief Complaint  Worsening shortness of breath    Hospital Course  Migue Cervantes is a 67 y.o. male admitted 10/26/2022 with transfer from [facility] in [facility] with acute on chronic respiratory failure.  Patient has significant history of COPD and is baseline on 4 L of oxygen via nasal cannula.  In addition he has a history of coronary artery disease, history of heart failure but last echocardiogram here at [facility] showed EF of 50%, diabetes, chronic pain.    The patient was recently hospitalized from 10//23 at [facility] for concern of pneumonia and COPD exacerbation.  During that time he was on steroids as well as antibiotics.  He also had a right sided thoracentesis with 2.5 L bloody fluid removed.  The patient was discharged but promptly returned to [place] with worsening shortness of breath.  He was initiated on BiPAP and was found to have a WBC of 55,000.  A CTA chest was negative for pulmonary embolism.  He was found to have a right posterior tibial vein DVT with initiated on heparin drip.  Also on the CT was concerning for right middle and lower lobe pneumonia with parapneumonic effusion with potential loculation with the possibility of underlying or obstructing mass difficult to exclude.  Patient was transferred to [facility] for further evaluation.    During the patient's hospitalization at [facility] he was initiated on heparin drip.  This was placed on hold with concern of possible chest tube placement on 10/26.  Patient went to stand up and get mobilized to go to CT here at [facility] but developed atrial fibrillation with rapid ventricular rate.  A rapid response was called patient was initiated on amiodarone drip and was transferred to the [facility].      Interval Problem Update  10/27: Patient converted to sinus tachycardia and amiodarone drip was stopped.  Patient has been off of heparin drip surgery has evaluated  patient and a chest tube was placed with over 1 L of bloody pleural fluid drainage.  Patient is on 6 L via nasal cannula.  He is alert but appears ill.  His wife and daughters are at bedside.  Patient does complain of chronic back pain.  His speech is clear but short winded.    I have discussed this patient's plan of care and discharge plan at IDT rounds today with Case Management, Nursing, Nursing leadership, and other members of the IDT team.    Consultants/Specialty  cardiology and general surgery    Code Status  Full Code    Disposition  Patient is not medically cleared for discharge.   Anticipate discharge to  be determined .  I have placed the appropriate orders for post-discharge needs.    Review of Systems  Review of Systems   Constitutional:  Positive for malaise/fatigue and weight loss. Negative for fever.   HENT:  Negative for sore throat.    Eyes:  Negative for discharge.   Respiratory:  Positive for cough and shortness of breath. Negative for sputum production.    Cardiovascular:  Positive for leg swelling. Negative for chest pain and palpitations.   Gastrointestinal:  Negative for abdominal pain and nausea.   Genitourinary:  Negative for dysuria.   Musculoskeletal:  Positive for back pain.   Neurological:  Negative for dizziness and headaches.   Psychiatric/Behavioral:  The patient is nervous/anxious.       Physical Exam  Temp:  [36.1 °C (96.9 °F)-36.4 °C (97.5 °F)] 36.1 °C (96.9 °F)  Pulse:  [] 93  Resp:  [15-46] 46  BP: ()/(57-90) 116/64  SpO2:  [81 %-100 %] 93 %    Physical Exam  Vitals reviewed.   Constitutional:       Appearance: He is obese. He is ill-appearing. He is not diaphoretic.   HENT:      Head: Normocephalic and atraumatic.      Nose: Nose normal.      Mouth/Throat:      Mouth: Mucous membranes are moist.      Pharynx: No oropharyngeal exudate.   Eyes:      General: No scleral icterus.        Right eye: No discharge.         Left eye: No discharge.      Extraocular Movements:  Extraocular movements intact.      Conjunctiva/sclera: Conjunctivae normal.   Neck:      Vascular: No carotid bruit.   Cardiovascular:      Rate and Rhythm: Normal rate and regular rhythm.      Pulses:           Radial pulses are 2+ on the right side and 2+ on the left side.        Dorsalis pedis pulses are 2+ on the right side and 2+ on the left side.      Heart sounds: No murmur heard.  Pulmonary:      Effort: Tachypnea and accessory muscle usage present. No respiratory distress.      Breath sounds: Decreased air movement present. Examination of the right-middle field reveals decreased breath sounds. Examination of the right-lower field reveals decreased breath sounds. Decreased breath sounds present. No wheezing or rales.   Abdominal:      General: Bowel sounds are normal. There is no distension.      Palpations: Abdomen is soft.      Tenderness: There is no abdominal tenderness.   Musculoskeletal:         General: No swelling or tenderness.      Cervical back: Neck supple. No tenderness. No muscular tenderness.      Right lower leg: Edema present.      Left lower leg: Edema present.   Skin:     Coloration: Skin is not jaundiced or pale.   Neurological:      General: No focal deficit present.      Mental Status: He is alert and oriented to person, place, and time. Mental status is at baseline.      Cranial Nerves: No cranial nerve deficit.   Psychiatric:         Mood and Affect: Mood normal.         Behavior: Behavior normal.       Fluids    Intake/Output Summary (Last 24 hours) at 10/27/2022 1500  Last data filed at 10/27/2022 1407  Gross per 24 hour   Intake 554.13 ml   Output 3015 ml   Net -2460.87 ml       Laboratory  Recent Labs     10/26/22  1229 10/27/22  0514   WBC 54.0* 49.0*   RBC 4.81 5.07   HEMOGLOBIN 14.0 14.8   HEMATOCRIT 41.4* 43.0   MCV 86.1 84.8   MCH 29.1 29.2   MCHC 33.8 34.4   RDW 40.6 40.3   PLATELETCT 348 351   MPV 9.4 9.4     Recent Labs     10/26/22  1229 10/27/22  0514   SODIUM 126* 125*    POTASSIUM 3.9 4.7   CHLORIDE 85* 88*   CO2 28 29   GLUCOSE 191* 167*   BUN 48* 41*   CREATININE 1.15 1.13   CALCIUM 8.8 8.3*     Recent Labs     10/26/22  1229   INR 1.07               Imaging  DX-CHEST-LIMITED (1 VIEW)   Final Result      1.  Interval placement right-sided chest tube with tip barely within or just outside the pleural space. Associated right chest subcutaneous emphysema.   2.  Questionable tiny right apical pneumothorax.   3.  Small to moderate right-sided pleural effusion with associated basilar atelectasis and/or consolidation.   4.  Left basilar atelectasis. Possible layering left pleural effusion.   5.  Mild enlargement of the cardiomediastinal silhouette.      Findings conveyed to Dr. Vidal at 12:20 PM via Voalte messaging on 10/27/2022.      EC-ECHOCARDIOGRAM COMPLETE W/ CONT   Final Result      DX-CHEST-PORTABLE (1 VIEW)   Final Result      1.  Small to moderate right-sided pleural effusion with associated basilar atelectasis and/or consolidation.   2.  Left basilar atelectasis.   3.  Mild enlargement of the cardiomediastinal silhouette.      CT-CTA CHEST PULMONARY ARTERY W/ RECONS    (Results Pending)        Assessment/Plan  * Loculated pleural effusion  Assessment & Plan  CTA chest at [place] 10/25/22: no PE, right middle and lower lobe pneumonias with parapneumonic effusion with loculation, possibility of an underlying or obstructing mass is difficult to exclude.  Attempting to get film room to upload images from [facility] if able.    New CTA chest ordered.  Had 2.5 liters from right sided thoracocentesis at [place] (daughters showed me a picture of the bloody appearing pleural fluid in the vacuum bottles)  F/u echo  Continue antibiotics  Status post chest tube placement 10/27/2022 by Dr. Bonds.  Pleural fluid sent for cytology, pH, cell count, chemistry, LDH, gram stain and culture    Oral candidiasis  Assessment & Plan  Recently on antibiotics and steroids  Nystatin ordered.    Atrial  fibrillation with RVR ([facility])  Assessment & Plan  afib with RVR upon standing up to go for CT  He required a rapid response and amiodarone bolus then drip with conversion to sinus  Monitor on telemetry  10/27 oral amiodarone, digoxin, carvedilol  Cardiology consulting  Rule out PE with CTA chest ordered.  Possible all related to right pleural effusion and acute on chronic respiratory failure    Chronic pain syndrome  Assessment & Plan  H/o cervical fusion and back surgeries  Home Percocet q 4 hr prn and lyrica 25mg daily  Added dilaudid after his chest tube placement 10/27    Acute on chronic respiratory failure with hypoxia (HCC)  Assessment & Plan  Dependent on 4 L at home  Currently on 6 L  Nebs, pulm toilet, PEP  Adjusted antibiotics due to lack of IV access 10/27  Diuresis as tolerates  No PE seen on CTA chest 10/25, he does RLE DVT   Restarted heparin drip 10/27 a few hours after his chest tube placement.  Follow-up echo with poor visualization of left side  10/27/22 Echo:  CONCLUSIONS  Left ventricle not adequately seen despite use of echocardiographic   contrast.  Unable to accurately assess LVEF or ventricular structural parameters.  LV appears grossly reduced in function compared to prior however.  The right ventricle is normal in size and systolic function.  Normal inferior vena cava size and inspiratory collapse.    Acute DVT (deep venous thrombosis) ([facility])  Assessment & Plan  Recent right posterior tibial vein DVT at another facility  CTA chest negative PE 10/25...Given new event with afib with RVR upon standing and was off heparin, rescan CTA chest ordered.  Restarting heparin drip and evaluate for any further bleeding in chest tube  If bleeding ongoing may need IVC filter    ACP (advance care planning)  Assessment & Plan  D/w pt -- FULL code    Chronic HFrEF (heart failure with reduced ejection fraction) ([facility])  Assessment & Plan  Repeat echo  Optimize HF meds    Parapneumonic effusion  Assessment &  Plan  Await pleural fluid analysis.  A chest tube was placed 10/27 by surgery.  Over 1 L of bloody fluid removed.    ICD (implantable cardioverter-defibrillator), [employer]- (present on admission)  Assessment & Plan  Per h/o    CAD (coronary artery disease)  Assessment & Plan  S/p remote PCI  ASA/statin/BB    GERD (gastroesophageal reflux disease)- (present on admission)  Assessment & Plan  PPI    Severe sepsis (HCC)  Assessment & Plan  Antibiotics  Patient is on midodrine  Fluids with care given appearance of fluid overload  Following up on cultures and pleural fluid analysis.       VTE prophylaxis: SCDs/TEDs and therapeutic anticoagulation with heparin    I have performed a physical exam and reviewed and updated ROS and Plan today (10/27/2022). In review of yesterday's note (10/26/2022), there are no changes except as documented above.

## 2022-10-27 NOTE — CARE PLAN
Problem: Aerosol Therapy  Goal: Improved hydration/ability to mobilize secretions and/or decreased airway edema  Description: Target End Date:  resolve prior to discharge or when underlying condition is resolved/stabilized    1.  Implement heated or cool aerosol therapy  2.  Assessed for optimal hydration, decreased edema and/or improved ability to mobilize secretions  Outcome: Progressing     Problem: Bronchopulmonary Hygiene  Goal: Increase mobilization of retained secretions  Description: Target End Date:  2 to 3 days    1.  Perform bronchopulmonary therapy as indicated by assessment  2.  Perform airway suctioning  3.  Perform actions to maintain patient airway  Outcome: Progressing       Respiratory Update    QID PEP/IS, 4.5L NC @ home    Pt tolerating current treatments well with no adverse reactions.

## 2022-10-27 NOTE — CARE PLAN
The patient is Watcher - Medium risk of patient condition declining or worsening    Shift Goals  Clinical Goals: hemodynamic stability  Patient Goals: decrease anxiety, rest  Family Goals: GOPI    Progress made toward(s) clinical / shift goals:    Problem: Knowledge Deficit - Standard  Goal: Patient and family/care givers will demonstrate understanding of plan of care, disease process/condition, diagnostic tests and medications  Outcome: Progressing     Problem: Impaired Gas Exchange  Goal: Patient will demonstrate improved ventilation and adequate oxygenation and participate in treatment regimen within the level of ability/situation.  Outcome: Progressing     Problem: Hemodynamics  Goal: Patient's hemodynamics, fluid balance and neurologic status will be stable or improve  Outcome: Progressing     Problem: Fall Risk  Goal: Patient will remain free from falls  Outcome: Progressing     Problem: Pain - Standard  Goal: Alleviation of pain or a reduction in pain to the patient’s comfort goal  Outcome: Progressing

## 2022-10-27 NOTE — CARE PLAN
The patient is Watcher - Medium risk of patient condition declining or worsening    Shift Goals  Clinical Goals: hemodynamic stability, control HR and rhythm  Patient Goals: comfort, rest  Family Goals: comfort, rest    Progress made toward(s) clinical / shift goals:      Problem: Knowledge Deficit - Standard  Goal: Patient and family/care givers will demonstrate understanding of plan of care, disease process/condition, diagnostic tests and medications  Outcome: Progressing     Problem: Hemodynamics  Goal: Patient's hemodynamics, fluid balance and neurologic status will be stable or improve  Outcome: Progressing     Problem: Fall Risk  Goal: Patient will remain free from falls  Outcome: Progressing     Problem: Pain - Standard  Goal: Alleviation of pain or a reduction in pain to the patient’s comfort goal  Outcome: Progressing     Problem: Skin Integrity  Goal: Skin integrity is maintained or improved  Outcome: Progressing       Patient is not progressing towards the following goals:

## 2022-10-27 NOTE — PROGRESS NOTES
Cardiology Follow Up Progress Note    Date of Service  10/27/2022    Attending Physician  Jerry Vidal D.O.    Chief Complaint   Cardiology consultation for evaluation of A. fib RVR triggered AICD shock.    HPI  Migue Cervantes is a 67 y.o. male with recent RLL pneumonia & COPD exacerbation (10/23/2022) requiring hospitalization to ICU in [place], required BiPAP treatment, recent right posterior tibial DVT, O2 dependent COPD, on 4 L of oxygen, prior PCI, HFrEF EF 30%, AICD in situ type 2 diabetes, chronic pain syndrome admitted 10/26/2022 from [facility] with right middle and lower lobe pneumonia with parapneumonic effusion and loculation.    Interim Events  No overnight cardiac events.  Reverted atrial fibrillation to normal sinus rhythm overnight on amiodarone.  Echocardiogram pending.  CTA chest pending  Plan for IR chest tube--empyema right.  Blood cultures negative to date.  Review of Systems  Review of Systems   Respiratory:  Negative for cough, choking, chest tightness, wheezing and stridor.      Vital signs in last 24 hours  Temp:  [36.1 °C (96.9 °F)-36.4 °C (97.5 °F)] 36.1 °C (96.9 °F)  Pulse:  [] 100  Resp:  [15-40] 24  BP: ()/(57-90) 114/81  SpO2:  [81 %-100 %] 95 %    Physical Exam  Physical Exam  Cardiovascular:      Rate and Rhythm: Normal rate and regular rhythm.      Pulses: Normal pulses.   Pulmonary:      Breath sounds: No wheezing.   Skin:     General: Skin is warm.   Neurological:      Mental Status: He is alert. Mental status is at baseline.   Psychiatric:         Mood and Affect: Mood normal.       Lab Review  Lab Results   Component Value Date/Time    WBC 49.0 (H) 10/27/2022 05:14 AM    RBC 5.07 10/27/2022 05:14 AM    HEMOGLOBIN 14.8 10/27/2022 05:14 AM    HEMATOCRIT 43.0 10/27/2022 05:14 AM    MCV 84.8 10/27/2022 05:14 AM    MCH 29.2 10/27/2022 05:14 AM    MCHC 34.4 10/27/2022 05:14 AM    MPV 9.4 10/27/2022 05:14 AM      Lab Results   Component Value Date/Time    SODIUM  125 (L) 10/27/2022 05:14 AM    POTASSIUM 4.7 10/27/2022 05:14 AM    CHLORIDE 88 (L) 10/27/2022 05:14 AM    CO2 29 10/27/2022 05:14 AM    GLUCOSE 167 (H) 10/27/2022 05:14 AM    BUN 41 (H) 10/27/2022 05:14 AM    CREATININE 1.13 10/27/2022 05:14 AM      Lab Results   Component Value Date/Time    ASTSGOT 18 10/27/2022 05:14 AM    ALTSGPT 16 10/27/2022 05:14 AM     Lab Results   Component Value Date/Time    CHOLSTRLTOT 129 09/12/2017 04:14 AM    LDL 69 09/12/2017 04:14 AM    HDL 27 (A) 09/12/2017 04:14 AM    TRIGLYCERIDE 167 (H) 09/12/2017 04:14 AM    TROPONINT 22 (H) 10/26/2022 12:29 PM       Recent Labs     10/26/22  1229   NTPROBNP 516*       Cardiac Imaging and Procedures Review  EKG:  My personal interpretation of the EKG dated 10/27/2022 is SR/ST.    Echocardiogram: Pending    Cardiac Catheterization:    CAD status post LAD territory MI 2017 with stent to prox LAD for 100% occlusion, 4x20 mm EBONIE, only luminal irregularities RCA/Circ. DAPT through 9-      Imaging  Chest X-Ray:    Small to moderate right-sided pleural effusion with associated basilar atelectasis and/or consolidation.  2.  Left basilar atelectasis.  3.  Mild enlargement of the cardiomediastinal silhouette.    Stress Test:  n/a    Assessment/Plan    #AICD shock for new A. fib RVR.  #Acute on chronic decompensated heart failure in the setting of lung .infection  #Severe sepsis pneumonia with loculated right pleural effusion, right-sided pneumonia versus obstructing mass, s/p thoracentesis 2.4 L removed.  On cefepime and linezolid  #Leukocytosis, WBC 49.  #AICD in situ for primary prevention, [employer], 7/2018.  #Known CAD PCI to LAD, 2017.  #Acute DVT.  #Type 2 diabetes.  No A1c available  #Hyponatremia, .    Recommendations  -Continue amiodarone 400 mg BID x2 weeks, then 200 mg daily.  -Continue digoxin 125 mcg m daily.  -Continue carvedilol 6.25 mg BID.  -Continue spironolactone 25 mg daily  -Continue furosemide 40 IV twice  daily.  -Strict intake and output  -Keep K above 4, mag above 2  -Resume lisinopril if no contraindication.  -Currently on midodrine 5 mg 3 times daily.  -Initiate OAC if not contraindicated.  Currently plan for IR chest tube-empyema right.  -CTA chest pending.    Follow-up on echocardiogram.  Cardiology will follow along.    I personally spent a total of 15 minutes which includes face-to-face time and non-face-to-face time spent on preparing to see the patient, reviewing hospital notes and tests, obtaining history from the patient, performing a medically appropriate exam, counseling and educating the patient, ordering medications/tests/procedures/referrals as clinically indicated, and documenting information in the electronic medical record.     Thank you for allowing me to participate in the care of this patient.  I will continue to follow this patient    Please contact me with any questions.    DORA Cook.   Cardiologist, [facility]  [phone]

## 2022-10-27 NOTE — PROGRESS NOTES
Patient converted to SR in the 80s at 2200. Dr. Wong ordered to stop Amio gtt in one hour as patient took PO Amio earlier.

## 2022-10-27 NOTE — THERAPY
SLP Contact Note    Attempted to see pt for clinical swallow evaluation. However, he is being kept NPO for chest tube placement. SLP will hold and see as medically appropriate.        10/27/22 1130   Treatment Variance   Reason For Missed Therapy Medical - Other (Please Comment)

## 2022-10-27 NOTE — CONSULTS
"Pulmonary Consultation    Date of consult: 10/27/2022    Referring Physician  Jerry Vidal D.O.    Reason for Consultation  COPD    History of Presenting Illness  67 y.o. male who presented 10/26/2022 with \" history of COPD dependent on 4.5 L,CAD s/p PCI, HFrEF 30% s/p ICD, DM, chronic pain syndrome dependent on narcotic who presented 10/26/2022 [place] with complaint of shortness of breath, pleuritic chest pain, subsequently found to have loculated right-sided pleural effusion and transferred to [facility] as direct admit for surgery evaluation.  Patient had complicated recent hospitalization from 10/17/2022 to 10/23/2022 for RLL pneumonia and COPD exacerbation, he was treated with Zosyn per chart review and was discharged on 10/23/2022.  Unfortunately, he was rehospitalized on 10/23/2022 to ICU, required BiPAP treatment, WBC 55k, 4.2 L thoracocentesis removed from right pleural cavity.  Additionally, he was found to have acute right posterior tibial vein DVT and was started on heparin drip\"    Transferred at [facility]  Last seen by pulm [facility]  No PFTs ins [facility]stem    Initially came to see pt 10/27 but was having chest tube placed on the right  10/28: Chest tube output     CXR this am personally viewed and chest tube in good location  Small b/l pleural effusion  Pacemaker  Large gastric bubble    Code Status  Full Code    Review of Systems  ROS  Pt feeling dizzy and daughters communicating for him  He was not communicating    Past Medical History   has a past medical history of Coronary artery disease due to lipid rich plaque, Dyslipidemia, Essential hypertension, Former tobacco use - quit Sep 2017 in setting of STEMI, Heart burn, History of ST elevation myocardial infarction (STEMI) LAD September 2017, Indigestion, Pain (09-20-12), and Status post insertion of drug-eluting stent into left anterior descending artery.    Surgical History   has a past surgical history that includes cervical " disk and fusion anterior (1997/2010); lumbar decompression (9/25/2012); fusion, spine, lumbar, plif (9/25/2012); lumbar decompression (9/25/2012); and beto by laparoscopy (N/A, 12/28/2015).    Family History  family history includes Heart Attack in his father, paternal grandfather, and paternal grandmother; Stroke in his father.    Social History   reports that he quit smoking about 5 years ago. His smoking use included cigarettes. He has a 70.00 pack-year smoking history. He has never used smokeless tobacco. He reports that he does not drink alcohol and does not use drugs.    Medications  Home Medications       Reviewed by Bertha StewartD (Pharmacist) on 10/26/22 at 1342  Med List Status: Complete     Medication Last Dose Status   albuterol 108 (90 Base) MCG/ACT Aero Soln inhalation aerosol PRN Active   amoxicillin-clavulanate (AUGMENTIN) 875-125 MG Tab 10/19/2022 Active   aspirin EC 81 MG EC tablet UNK Active   atorvastatin (LIPITOR) 40 MG Tab UNK Active   azithromycin (ZITHROMAX) 250 MG Tab 10/16/2022 Active   carvedilol (COREG) 25 MG Tab UNK Active   cyanocobalamin (VITAMIN B-12) 500 MCG Tab UNK Active   ferrous sulfate 325 (65 Fe) MG tablet UNK Active   fluticasone-salmeterol (ADVAIR DISKUS) 250-50 MCG/ACT AEROSOL POWDER, BREATH ACTIVATED UNK Active   furosemide (LASIX) 40 MG Tab UNK Active   lisinopril (PRINIVIL) 2.5 MG Tab UNK Active   metFORMIN (GLUCOPHAGE) 500 MG Tab UNK Active   omeprazole (PRILOSEC) 40 MG delayed-release capsule UNK Active   oxyCODONE-acetaminophen (PERCOCET-10)  MG Tab UNK Active   pregabalin (LYRICA) 25 MG Cap UNK Active   spironolactone (ALDACTONE) 25 MG Tab UNK Active   tiotropium (SPIRIVA RESPIMAT) 2.5 mcg/Act Aero Soln UNK Active   traZODone (DESYREL) 50 MG Tab UNK Active   vitamin D3 (CHOLECALCIFEROL) 1000 Unit (25 mcg) Tab UNK Active                  Current Facility-Administered Medications   Medication Dose Route Frequency Provider Last Rate Last Admin     ipratropium-albuterol (DUONEB) nebulizer solution  3 mL Nebulization 4X/DAY (RT) MYLA ObandoOVivek   3 mL at 10/28/22 1138    ampicillin/sulbactam (UNASYN) 3 g in  mL IVPB  3 g Intravenous Q6HRS Jeremy Munson M.D.   Stopped at 10/28/22 1142    HYDROmorphone (Dilaudid) injection 1 mg  1 mg Intravenous Q3HRS PRN MYLA ObandoOVivek   1 mg at 10/28/22 1001    oxyCODONE-acetaminophen (PERCOCET-10)  MG per tablet 1 Tablet  1 Tablet Oral Q4HRS PRN Jerry Vidal D.O.   1 Tablet at 10/28/22 0840    heparin infusion 25,000 units in 500 mL 0.45% NACL  0-30 Units/kg/hr (Adjusted) Intravenous Continuous Jerry Vidal D.O. 21.3 mL/hr at 10/28/22 1112 12 Units/kg/hr at 10/28/22 1112    heparin injection 3,600 Units  40 Units/kg (Adjusted) Intravenous PRN Jerry Vidal D.O.        senna-docusate (PERICOLACE or SENOKOT S) 8.6-50 MG per tablet 2 Tablet  2 Tablet Oral BID Noe Amador M.D.   2 Tablet at 10/28/22 0453    And    polyethylene glycol/lytes (MIRALAX) PACKET 1 Packet  1 Packet Oral QDAY PRN Noe Amador M.D.        And    magnesium hydroxide (MILK OF MAGNESIA) suspension 30 mL  30 mL Oral QDAY PRN Noe Amador M.D.        And    bisacodyl (DULCOLAX) suppository 10 mg  10 mg Rectal QDAY PRN Noe Amador M.D.        acetaminophen (Tylenol) tablet 650 mg  650 mg Oral Q6HRS PRN Noe Amador M.D.        labetalol (NORMODYNE/TRANDATE) injection 10 mg  10 mg Intravenous Q4HRS PRN Noe Amador M.D.        ondansetron (ZOFRAN) syringe/vial injection 4 mg  4 mg Intravenous Q4HRS PRN Noe Amador M.D.        ondansetron (ZOFRAN ODT) dispertab 4 mg  4 mg Oral Q4HRS PRN Noe Amador M.D.        guaiFENesin dextromethorphan (ROBITUSSIN DM) 100-10 MG/5ML syrup 10 mL  10 mL Oral Q6HRS PRN Noe Amador M.D.        Respiratory Therapy Consult   Nebulization Continuous RT Noe Amador M.D.        ipratropium-albuterol (DUONEB) nebulizer solution  3 mL Nebulization Q2HRS PRN (RT) Noe Amador M.D.   3 mL  at 10/27/22 0449    atorvastatin (LIPITOR) tablet 40 mg  40 mg Oral Q EVENING Noe Amador M.D.   40 mg at 10/27/22 1755    ferrous sulfate tablet 325 mg  325 mg Oral DAILY Noe Amador M.D.   325 mg at 10/28/22 0454    [Held by provider] lisinopril (PRINIVIL) tablet 2.5 mg  2.5 mg Oral DAILY Noe Amador M.D.        omeprazole (PRILOSEC) capsule 40 mg  40 mg Oral DAILY Noe Amador M.D.   40 mg at 10/28/22 0453    spironolactone (ALDACTONE) tablet 25 mg  25 mg Oral DAILY Noe Amador M.D.   25 mg at 10/28/22 0453    traZODone (DESYREL) tablet 100 mg  100 mg Oral Nightly Noe Amador M.D.   100 mg at 10/27/22 2037    predniSONE (DELTASONE) tablet 40 mg  40 mg Oral DAILY Noe Amador M.D.   40 mg at 10/28/22 0453    pregabalin (LYRICA) capsule 25 mg  25 mg Oral BID Noe Amador M.D.   25 mg at 10/28/22 0453    insulin regular (HumuLIN R,NovoLIN R) injection  1-6 Units Subcutaneous 4X/DAY KENIA Amador M.D.   3 Units at 10/28/22 0741    And    dextrose 50% (D50W) injection 25 g  25 g Intravenous Q15 MIN PRN Noe Amador M.D.        Metoprolol Tartrate (LOPRESSOR) injection 5 mg  5 mg Intravenous Q5 MIN PRN Noe Amador M.D.        nystatin (MYCOSTATIN) 398411 UNIT/ML suspension 1,000,000 Units  10 mL Swish & Swallow 4X/DAY Jerry Vidal D.O.   1,000,000 Units at 10/28/22 0840    carvedilol (COREG) tablet 6.25 mg  6.25 mg Oral BID WITH MEALS Bart Salazar M.D.   6.25 mg at 10/28/22 0840    digoxin (LANOXIN) tablet 250 mcg  250 mcg Oral DAILY AT 1800 Bart Salazar M.D.   250 mcg at 10/27/22 1755    amiodarone (Cordarone) tablet 400 mg  400 mg Oral TWICE DAILY Bart Salazar M.D.   400 mg at 10/28/22 0453    LORazepam (ATIVAN) injection 0.5 mg  0.5 mg Intravenous Q4HRS PRN Jerry Vidal D.O.   0.5 mg at 10/28/22 0138    furosemide (LASIX) injection 40 mg  40 mg Intravenous BID DIURETIC Kedar Wong DVivekOVivek   40 mg at 10/28/22 0453       Allergies  Allergies   Allergen  Reactions    Nkda [No Known Drug Allergy]        Vital Signs last 24 hours  Temp:  [35.9 °C (96.7 °F)-36.9 °C (98.4 °F)] 35.9 °C (96.7 °F)  Pulse:  [] 106  Resp:  [14-64] 32  BP: ()/(59-83) 116/63  SpO2:  [90 %-95 %] 94 %    Chest tube output 1560  No air leak  Physical Exam  Physical Exam  Constitutional:       Appearance: He is ill-appearing.   HENT:      Head: Normocephalic and atraumatic.      Mouth/Throat:      Mouth: Mucous membranes are dry.   Eyes:      Extraocular Movements: Extraocular movements intact.      Pupils: Pupils are equal, round, and reactive to light.   Cardiovascular:      Rate and Rhythm: Normal rate.   Pulmonary:      Effort: Pulmonary effort is normal.      Comments: B/l post breath sounds  Skin:     General: Skin is warm.   Neurological:      Comments: Moving all 4 extremities   Psychiatric:      Comments: Unable to assess as pt feeling too weak to converse       Fluids    Intake/Output Summary (Last 24 hours) at 10/28/2022 1234  Last data filed at 10/28/2022 0656  Gross per 24 hour   Intake 224 ml   Output 2810 ml   Net -2586 ml       Laboratory  Recent Results (from the past 48 hour(s))   EKG    Collection Time: 10/26/22  2:06 PM   Result Value Ref Range    Report       [facility]    Test Date:  2022-10-26  Pt Name:    MIRLANDE DOZIER                  Department: [facility]  MRN:        [id number]                      Room:       [facility]  Gender:     Male                         Technician: [facility]  :        1955                   Requested By:GRISELDA BANERJEE  Order #:    [id number]                    Reading MD: José Rueda MD    Measurements  Intervals                                Axis  Rate:       123                          P:  SD:                                      QRS:        -9  QRSD:       90                           T:          160  QT:         341  QTc:        488    Interpretive Statements  Atrial flutter with varied AV block,  Low voltage, precordial  leads  Abnormal T, consider ischemia, lateral leads  Electronically Signed On 10- 14:55:11 PDT by José Rueda MD     EKG    Collection Time: 10/26/22  2:30 PM   Result Value Ref Range    Report       [facility]    Test Date:  2022-10-26  Pt Name:    MIRLANDE DOZIER                  Department: [facility]  MRN:        [id number]                      Room:       [facility]  Gender:     Male                         Technician: Counts include 234 beds at the [facility]  :        1955                   Requested By:GRISELDA BANERJEE  Order #:    [id number]                    Reading MD: José Rueda MD    Measurements  Intervals                                Axis  Rate:       174                          P:  SC:                                      QRS:        -13  QRSD:       92                           T:          145  QT:         270  QTc:        460    Interpretive Statements  Atrial fibrillation with rapid V-rate  Low voltage, precordial leads  Repolarization abnormality, prob rate related  Electronically Signed On 10- 14:48:22 PDT by José Rueda MD     COV-2, FLU A/B, AND RSV BY PCR (2-4 HOURS CEPHEID): Collect NP swab in VTM    Collection Time: 10/26/22  3:52 PM    Specimen: Nasal; Respirate   Result Value Ref Range    Influenza virus A RNA Negative Negative    Influenza virus B, PCR Negative Negative    RSV, PCR Negative Negative    SARS-CoV-2 by PCR NotDetected     SARS-CoV-2 Source NP Swab    POCT glucose device results    Collection Time: 10/26/22  6:19 PM   Result Value Ref Range    POC Glucose, Blood 169 (H) 65 - 99 mg/dL   POCT glucose device results    Collection Time: 10/26/22  8:51 PM   Result Value Ref Range    POC Glucose, Blood 169 (H) 65 - 99 mg/dL   CBC WITH DIFFERENTIAL    Collection Time: 10/27/22  5:14 AM   Result Value Ref Range    WBC 49.0 (H) 4.8 - 10.8 K/uL    RBC 5.07 4.70 - 6.10 M/uL    Hemoglobin 14.8 14.0 - 18.0 g/dL    Hematocrit 43.0 42.0 - 52.0 %    MCV 84.8 81.4 - 97.8 fL    MCH 29.2 27.0 - 33.0 pg     MCHC 34.4 33.7 - 35.3 g/dL    RDW 40.3 35.9 - 50.0 fL    Platelet Count 351 164 - 446 K/uL    MPV 9.4 9.0 - 12.9 fL    Neutrophils-Polys 90.40 (H) 44.00 - 72.00 %    Lymphocytes 2.60 (L) 22.00 - 41.00 %    Monocytes 7.00 0.00 - 13.40 %    Eosinophils 0.00 0.00 - 6.90 %    Basophils 0.00 0.00 - 1.80 %    Nucleated RBC 0.00 /100 WBC    Neutrophils (Absolute) 44.30 (H) 1.82 - 7.42 K/uL    Lymphs (Absolute) 1.27 1.00 - 4.80 K/uL    Monos (Absolute) 3.43 (H) 0.00 - 0.85 K/uL    Eos (Absolute) 0.00 0.00 - 0.51 K/uL    Baso (Absolute) 0.00 0.00 - 0.12 K/uL    NRBC (Absolute) 0.00 K/uL   Comp Metabolic Panel    Collection Time: 10/27/22  5:14 AM   Result Value Ref Range    Sodium 125 (L) 135 - 145 mmol/L    Potassium 4.7 3.6 - 5.5 mmol/L    Chloride 88 (L) 96 - 112 mmol/L    Co2 29 20 - 33 mmol/L    Anion Gap 8.0 7.0 - 16.0    Glucose 167 (H) 65 - 99 mg/dL    Bun 41 (H) 8 - 22 mg/dL    Creatinine 1.13 0.50 - 1.40 mg/dL    Calcium 8.3 (L) 8.5 - 10.5 mg/dL    AST(SGOT) 18 12 - 45 U/L    ALT(SGPT) 16 2 - 50 U/L    Alkaline Phosphatase 62 30 - 99 U/L    Total Bilirubin 0.6 0.1 - 1.5 mg/dL    Albumin 3.0 (L) 3.2 - 4.9 g/dL    Total Protein 5.2 (L) 6.0 - 8.2 g/dL    Globulin 2.2 1.9 - 3.5 g/dL    A-G Ratio 1.4 g/dL   MAGNESIUM    Collection Time: 10/27/22  5:14 AM   Result Value Ref Range    Magnesium 2.2 1.5 - 2.5 mg/dL   PHOSPHORUS    Collection Time: 10/27/22  5:14 AM   Result Value Ref Range    Phosphorus 2.8 2.5 - 4.5 mg/dL   ESTIMATED GFR    Collection Time: 10/27/22  5:14 AM   Result Value Ref Range    GFR (CKD-EPI) 70 >60 mL/min/1.73 m 2   DIFFERENTIAL MANUAL    Collection Time: 10/27/22  5:14 AM   Result Value Ref Range    Manual Diff Status PERFORMED    PERIPHERAL SMEAR REVIEW    Collection Time: 10/27/22  5:14 AM   Result Value Ref Range    Peripheral Smear Review see below    PLATELET ESTIMATE    Collection Time: 10/27/22  5:14 AM   Result Value Ref Range    Plt Estimation Normal    MORPHOLOGY    Collection Time:  10/27/22  5:14 AM   Result Value Ref Range    RBC Morphology Present     Poikilocytosis 1+     Ovalocytes 1+    POCT glucose device results    Collection Time: 10/27/22  6:34 AM   Result Value Ref Range    POC Glucose, Blood 184 (H) 65 - 99 mg/dL   EKG    Collection Time: 10/27/22  7:14 AM   Result Value Ref Range    Report       [facility]    Test Date:  2022-10-27  Pt Name:    MIRLANDE DOZIER                  Department: [facility]  MRN:        [id number]                      Room:       [facility]  Gender:     Male                         Technician: [facility]  :        1955                   Requested By:MAURICIO HOLLAND  Order #:    [id number]                    Reading MD: Ras Coles MD    Measurements  Intervals                                Axis  Rate:       100                          P:          57  MI:         164                          QRS:        -15  QRSD:       95                           T:          136  QT:         349  QTc:        451    Interpretive Statements  Sinus tachycardia  Borderline left axis deviation  Low voltage, precordial leads  Anteroseptal infarct, old  Nonspecific T abnormalities, lateral leads  Electronically Signed On 10- 15:32:11 PDT by Ras Coles MD     LACTIC ACID    Collection Time: 10/27/22 10:30 AM   Result Value Ref Range    Lactic Acid 3.4 (H) 0.5 - 2.0 mmol/L   POCT glucose device results    Collection Time: 10/27/22 10:32 AM   Result Value Ref Range    POC Glucose, Blood 235 (H) 65 - 99 mg/dL   EC-ECHOCARDIOGRAM COMPLETE W/ CONT    Collection Time: 10/27/22 11:26 AM   Result Value Ref Range    Eject.Frac. MOD BP 37.94     Eject.Frac. MOD 4C 38.24     Eject.Frac. MOD 2C 29.79    Fluid Glucose (pleural fluid)    Collection Time: 10/27/22 11:50 AM   Result Value Ref Range    Fluid Type Pleural     Glucose, Fluid 139 mg/dL   Fluid pH (pleural fluid)    Collection Time: 10/27/22 12:25 PM   Result Value Ref Range    Fluid Type Pleural     Ph Misc 8.00    Fluid  Cell Count (pleural fluid)    Collection Time: 10/27/22 12:25 PM   Result Value Ref Range    Fluid Type Pleural     Color-Body Fluid Red     Character-Body Fluid Bloody     Total RBC Count 53348 cells/uL    Total  cells/uL    Polys 79 %    Lymphs 21 %   Fluid Total Protein (pleural fluid)    Collection Time: 10/27/22 12:25 PM   Result Value Ref Range    Total Protein Fluid 2.8 g/dL   Fluid LDH (pleural fluid)    Collection Time: 10/27/22 12:25 PM   Result Value Ref Range    Body Fluid  U/L   Fluid Culture W/Gram Stain (pleural fluid)    Collection Time: 10/27/22 12:25 PM    Specimen: Pleural Fluid; Body Fluid   Result Value Ref Range    Significant Indicator NEG     Source BF     Site PLEURAL FLUID     Culture Result -     Gram Stain Result No organisms seen.    Anaerobic Culture (pleural fluid)    Collection Time: 10/27/22 12:25 PM    Specimen: Pleural Fluid; Body Fluid   Result Value Ref Range    Significant Indicator NEG     Source BF     Site PLEURAL FLUID     Culture Result -    GRAM STAIN    Collection Time: 10/27/22 12:25 PM    Specimen: Body Fluid   Result Value Ref Range    Significant Indicator .     Source BF     Site PLEURAL FLUID     Gram Stain Result No organisms seen.    MRSA By PCR (Amp)    Collection Time: 10/27/22  1:05 PM    Specimen: Nares; Respirate   Result Value Ref Range    MRSA by PCR Negative Negative   Cytology (pleural fluid)    Collection Time: 10/27/22  1:16 PM   Result Value Ref Range    Cytology Reg See Path Report    aPTT    Collection Time: 10/27/22  3:30 PM   Result Value Ref Range    APTT 25.2 24.7 - 36.0 sec   Prothrombin Time    Collection Time: 10/27/22  3:30 PM   Result Value Ref Range    PT 14.3 12.0 - 14.6 sec    INR 1.12 0.87 - 1.13   Heparin Xa (Unfractionated)    Collection Time: 10/27/22  3:30 PM   Result Value Ref Range    Heparin Xa (UFH) <0.10 IU/mL   POCT glucose device results    Collection Time: 10/27/22  5:03 PM   Result Value Ref Range    POC  Glucose, Blood 215 (H) 65 - 99 mg/dL   POCT glucose device results    Collection Time: 10/27/22  8:48 PM   Result Value Ref Range    POC Glucose, Blood 218 (H) 65 - 99 mg/dL   Heparin Anti-Xa    Collection Time: 10/27/22 11:45 PM   Result Value Ref Range    Heparin Xa (UFH) >1.10 (HH) IU/mL   Heparin Anti-Xa    Collection Time: 10/28/22  6:40 AM   Result Value Ref Range    Heparin Xa (UFH) >1.10 (HH) IU/mL   POCT glucose device results    Collection Time: 10/28/22  7:37 AM   Result Value Ref Range    POC Glucose, Blood 256 (H) 65 - 99 mg/dL       Imaging  As above    Assessment/Plan  Acute on chronic respiratory failure with hypoxia (HCC)  Assessment & Plan  Multifactorial  parapneumonic loculated pleural effusion on right, chest tube placed by surgery 10/27 and being managed by surgery  Known hx of heart failure  Atrial fib with RVR now rate controlled  COPD [facility] O[facility] and spiriva    Improving slowly  Can taper prednisone now as no further wheezing  Nebs prn

## 2022-10-27 NOTE — CARE PLAN
Problem: Bronchopulmonary Hygiene  Goal: Increase mobilization of retained secretions  Description: Target End Date:  2 to 3 days    1.  Perform bronchopulmonary therapy as indicated by assessment  2.  Perform airway suctioning  3.  Perform actions to maintain patient airway  Flowsheets  Taken 10/27/2022 0509 by Idalia Carr, RRT  Bronchopulmonary Hygiene Outcomes:   Patient subjective impression of less retention and improved clearance   Patient at stable baseline  Taken 10/26/2022 1400 by Sarai Flores, RRT  Bronchopulmonary Hygiene Indications: Patient with Chronic Pulmonary Disease on Home Frequency  Note: Patient baseline FiO2 at home is 4.5L

## 2022-10-27 NOTE — DISCHARGE PLANNING
Case Management Discharge Planning    Admission Date: 10/26/2022  GMLOS: 5  ALOS: 1    6-Clicks ADL Score: 19  6-Clicks Mobility Score: 19      Anticipated Discharge Dispo: Discharge Disposition: Discharged to home/self care (01)    DME Needed: No    Action(s) Taken: DC Assessment Complete (See below) CM met with patient and wife daughters and wife at bedside, wife is Janeth at [phone]. Per patient he uses oxygen at home 4l n/c. Patient does not have any DME and is independent at this time, they live in a one story home and Patient is a  and [facility]. Per daughter they would like to try to get a rollator, a shower chair for patient from the [facility] as well as home care.  CM will phone patient pcp and see if that can be arranged.      Escalations Completed: None    Medically Clear: No    Next Steps: follow for patient discharge needs.     Barriers to Discharge: None    Is the patient up for discharge tomorrow: No

## 2022-10-27 NOTE — H&P
[facility] PHYSICAL     PATIENT ID:  NAME:  Migue Cervantes  MRN:               [id number]  YOB: 1955    Date of Admission:   10/26/2022       Attending Physician:    Dr. Jerry Vidal    CC:    Right Pleural Effusion      HPI:   Migue Cervantes is a 67 y.o. male with past medical history of COPD dependent on 4.5 L of O2, hypertension, dyslipidemia, CAD status post PCI of the LAD in 2017, HFrEF latest 50% in 2020, s/p ICD placement, diabetes type 2, chronic back pain s/p multiple fusions, and recent history of RLE DVT who was transferred from [facility] in [place] to [facility] for a large loculated right pleural effusion s/p thoracentesis 2.4 L removal.  Patient was initially admitted on 10/17-10/23 at [facility] for treatment of right lower lobe pneumonia and acute on chronic COPD exacerbation.  Patient states that his shortness of breath had started approximately 3 weeks prior and gradually worsened. Was placed on Zosyn while inpatient and discharged on 10/23 with cefdinir and doxycycline.  The following day on the 24th patient's shortness of breath worsened requiring him to go back to [facility] to be evaluated.  Was found to have a large right-sided pleural effusion requiring thoracentesis.  Had about 2.4 L removed.  He was also found to have severe sepsis with a white count of 54.  Was then required to be transferred out of the facility for higher level of care.      ERCourse:  Transferred over, with medical records, from [facility] for large loculated right pleural effusion s/p thoracentesis 2.4 L removed and severe sepsis (leukocyte count 54).  Initial pleural effusion analysis 40 nucleated cells and 69% PMNs.    REVIEW OF SYSTEMS:   Ten systems reviewed and were negative except as noted in the HPI.                PAST MEDICAL HISTORY:  Past Medical History:   Diagnosis Date    Coronary artery disease due to lipid rich plaque     Dyslipidemia     Essential hypertension      Former tobacco use - quit Sep 2017 in setting of STEMI     Heart burn     History of ST elevation myocardial infarction (STEMI) LAD September 2017     Indigestion     Pain 09-20-12    lower back, 8/10    Status post insertion of drug-eluting stent into left anterior descending artery        PAST SURGICAL HISTORY:  Past Surgical History:   Procedure Laterality Date    MARQUISE BY LAPAROSCOPY N/A 12/28/2015    Procedure: MARQUISE BY LAPAROSCOPY- With Grams ;  Surgeon: Sandro Obregon M.D.;  Location: SURGERY [facility];  Service:     LUMBAR DECOMPRESSION  9/25/2012    by Dr. Plata    FUSION, SPINE, LUMBAR, PLIF  9/25/2012    Performed by Mckayla Plata M.D. at SURGERY [facility]    LUMBAR DECOMPRESSION  9/25/2012    Performed by Mckayla Plata M.D. at SURGERY [facility]    CERVICAL DISK AND FUSION ANTERIOR  1997/2010    x 2       FAMILY HISTORY:  Family History   Problem Relation Age of Onset    Heart Attack Father     Stroke Father     Heart Attack Paternal Grandmother     Heart Attack Paternal Grandfather        SOCIAL HISTORY:   >40 years of smoking history, on average 2 packs/day.  Occasional alcohol use.  No illicit drug use.    DIET:   Orders Placed This Encounter   Procedures    Diet NPO Restrict to: Strict     Standing Status:   Standing     Number of Occurrences:   1     Order Specific Question:   Diet NPO Restrict to:     Answer:   Strict [1]       ALLERGIES:  Allergies   Allergen Reactions    Nkda [No Known Drug Allergy]        OUTPATIENT MEDICATIONS:    Current Facility-Administered Medications:     senna-docusate (PERICOLACE or SENOKOT S) 8.6-50 MG per tablet 2 Tablet, 2 Tablet, Oral, BID, 2 Tablet at 10/27/22 0522 **AND** polyethylene glycol/lytes (MIRALAX) PACKET 1 Packet, 1 Packet, Oral, QDAY PRN **AND** magnesium hydroxide (MILK OF MAGNESIA) suspension 30 mL, 30 mL, Oral, QDAY PRN **AND** bisacodyl (DULCOLAX) suppository 10 mg, 10 mg, Rectal, QDAY PRN, Noe Amador M.D.    acetaminophen  (Tylenol) tablet 650 mg, 650 mg, Oral, Q6HRS PRN, Noe Amador M.D.    labetalol (NORMODYNE/TRANDATE) injection 10 mg, 10 mg, Intravenous, Q4HRS PRN, Noe Amador M.D.    ondansetron (ZOFRAN) syringe/vial injection 4 mg, 4 mg, Intravenous, Q4HRS PRN, Noe Amador M.D.    ondansetron (ZOFRAN ODT) dispertab 4 mg, 4 mg, Oral, Q4HRS PRN, Noe Amador M.D.    guaiFENesin dextromethorphan (ROBITUSSIN DM) 100-10 MG/5ML syrup 10 mL, 10 mL, Oral, Q6HRS PRN, Noe Amador M.D.    lactated ringers infusion (BOLUS), 500 mL, Intravenous, Once PRN, Noe Amador M.D.    Respiratory Therapy Consult, , Nebulization, Continuous RT, Noe Amador M.D.    ipratropium-albuterol (DUONEB) nebulizer solution, 3 mL, Nebulization, Q4HRS (RT), Noe Amador M.D., 3 mL at 10/27/22 0709    ipratropium-albuterol (DUONEB) nebulizer solution, 3 mL, Nebulization, Q2HRS PRN (RT), Noe Amador M.D., 3 mL at 10/27/22 0449    albuterol inhaler 2 Puff, 2 Puff, Inhalation, Q6HRS PRN (RT), Noe Amador M.D.    atorvastatin (LIPITOR) tablet 40 mg, 40 mg, Oral, Q EVENING, Noe Amador M.D., 40 mg at 10/26/22 1819    ferrous sulfate tablet 325 mg, 325 mg, Oral, DAILY, Noe Amador M.D., 325 mg at 10/27/22 0520    [Held by provider] lisinopril (PRINIVIL) tablet 2.5 mg, 2.5 mg, Oral, DAILY, Noe Amador M.D.    omeprazole (PRILOSEC) capsule 40 mg, 40 mg, Oral, DAILY, Noe Amador M.D., 40 mg at 10/27/22 0521    spironolactone (ALDACTONE) tablet 25 mg, 25 mg, Oral, DAILY, Noe Amador M.D., 25 mg at 10/27/22 0600    tiotropium (Spiriva Respimat) 2.5 mcg/Act inhalation spray 5 mcg, 5 mcg, Inhalation, DAILY, Noe Amador M.D., 5 mcg at 10/27/22 0741    traZODone (DESYREL) tablet 100 mg, 100 mg, Oral, Nightly, Noe Amador M.D., 100 mg at 10/26/22 2114    predniSONE (DELTASONE) tablet 40 mg, 40 mg, Oral, DAILY, Noe Amador M.D., 40 mg at 10/27/22 0521    pregabalin (LYRICA) capsule 25 mg, 25 mg, Oral, BID, Noe Amador M.D., 25 mg at 10/27/22  0522    oxyCODONE-acetaminophen (PERCOCET-10)  MG per tablet 1 Tablet, 1 Tablet, Oral, Q6HRS PRN, Noe Amador M.D., 1 Tablet at 10/27/22 0522    insulin regular (HumuLIN R,NovoLIN R) injection, 1-6 Units, Subcutaneous, 4X/DAY ACHS, 1 Units at 10/27/22 0634 **AND** POC blood glucose manual result, , , Q AC AND BEDTIME(S) **AND** NOTIFY MD and PharmD, , , Once **AND** Administer 20 grams of glucose (approximately 8 ounces of fruit juice) every 15 minutes PRN FSBG less than 70 mg/dL, , , PRN **AND** dextrose 50% (D50W) injection 25 g, 25 g, Intravenous, Q15 MIN PRN, Noe Amador M.D.    midodrine (PROAMATINE) tablet 5 mg, 5 mg, Oral, TID WITH MEALS, Noe Amador M.D., 5 mg at 10/27/22 0521    dextrose 5% infusion, , Intravenous, Continuous, Noe Amador M.D., Last Rate: 30 mL/hr at 10/26/22 1459, New Bag at 10/26/22 1459    amiodarone (Nexterone) 360 mg/200 mL infusion, 0.5-1 mg/min, Intravenous, Continuous, Noe Amador M.D., Stopped at 10/27/22 0006    Metoprolol Tartrate (LOPRESSOR) injection 5 mg, 5 mg, Intravenous, Q5 MIN PRN, Noe Amador M.D.    nystatin (MYCOSTATIN) 362043 UNIT/ML suspension 1,000,000 Units, 10 mL, Swish & Swallow, 4X/DAY, Jerry Vidal D.O., 1,000,000 Units at 10/26/22 2054    carvedilol (COREG) tablet 6.25 mg, 6.25 mg, Oral, BID WITH MEALS, Bart Salazar M.D., 6.25 mg at 10/27/22 0527    digoxin (LANOXIN) tablet 250 mcg, 250 mcg, Oral, DAILY AT 1800, Bart Salazar M.D., 250 mcg at 10/26/22 1832    amiodarone (Cordarone) tablet 400 mg, 400 mg, Oral, TWICE DAILY, Bart Salazar M.D., 400 mg at 10/27/22 0521    LORazepam (ATIVAN) injection 0.5 mg, 0.5 mg, Intravenous, Q4HRS PRN, Jerry Vidal D.O., 0.5 mg at 10/27/22 0548    cefepime (Maxipime) 2 g in  mL IVPB, 2 g, Intravenous, Q8HRS, MYLA ObandoOVivek, Stopped at 10/27/22 0552    linezolid (ZYVOX) tablet 600 mg, 600 mg, Oral, Q12HRS, MYLA ObandoOVivek, 600 mg at 10/27/22 0520    furosemide  (LASIX) injection 40 mg, 40 mg, Intravenous, BID DIURETIC, Kedar Wong, D.O., 40 mg at 10/27/22 0522    PHYSICAL EXAM:  Vitals:    10/27/22 0451 10/27/22 0500 10/27/22 0600 10/27/22 0800   BP:   (!) 115/90 114/81   Pulse: (!) 105 (!) 106 (!) 105 100   Resp: (!) 31  (!) 22 (!) 24   Temp:    36.1 °C (96.9 °F)   TempSrc:    Oral   SpO2: 92% 94% 93% 95%   Weight:       Height:       , Temp (24hrs), Av.3 °C (97.3 °F), Min:36.1 °C (96.9 °F), Max:36.4 °C (97.5 °F)  , Pulse Oximetry: 95 %, O2 (LPM): 5, O2 Delivery Device: Silicone Nasal Cannula      General: Pt with mild distress due to SOB, cooperative   Skin:  Pink, warm and dry.  No rashes  HEENT: NC/AT. PERRL. EOMI. MMM. No nasal discharge. Oropharynx nonerythematous without exudate/plaques  Neck:  Supple without lymphadenopathy or rigidity. No JVD   Lungs:  Symmetrical.  Distant lung sounds with few crackles in LLL, diminished right lower lobe.  On 4.5L of O2 via high flow.  No wheezing.  Cardiovascular: RRR.  Rate 98, normal rhythm.  1+ bilateral lower extremity edema.  Abdomen:  Abdomen is soft NT/ND. No masses noted.  Extremities:  Full range of motion. No gross deformities noted. 2+ pulses in all extremities.  No tenderness to palpation over calves bilaterally.  No skin changes, erythema.        LAB TESTS:   Recent Labs     10/26/22  1229 10/27/22  0514   WBC 54.0* 49.0*   RBC 4.81 5.07   HEMOGLOBIN 14.0 14.8   HEMATOCRIT 41.4* 43.0   MCV 86.1 84.8   MCH 29.1 29.2   RDW 40.6 40.3   PLATELETCT 348 351   MPV 9.4 9.4   NEUTSPOLYS 95.80* 90.40*   LYMPHOCYTES 0.00* 2.60*   MONOCYTES 4.20 7.00   EOSINOPHILS 0.00 0.00   BASOPHILS 0.00 0.00   RBCMORPHOLO Present Present         Recent Labs     10/26/22  1229 10/27/22  0514   SODIUM 126* 125*   POTASSIUM 3.9 4.7   CHLORIDE 85* 88*   CO2 28 29   BUN 48* 41*   CREATININE 1.15 1.13   CALCIUM 8.8 8.3*   MAGNESIUM 2.0 2.2   PHOSPHORUS 3.1 2.8   ALBUMIN 3.2 3.0*       CULTURES:   Results       Procedure Component Value  "Units Date/Time    COV-2, FLU A/B, AND RSV BY PCR (2-4 HOURS CEPHEID): Collect NP swab in [facility] [[id number]] Collected: 10/26/22 1552    Order Status: Completed Specimen: Respirate from Nasal Updated: 10/26/22 1733     Influenza virus A RNA Negative     Influenza virus B, PCR Negative     RSV, PCR Negative     SARS-CoV-2 by PCR NotDetected     Comment: PATIENTS: Important information regarding your results and instructions can  be found at [email or web address]   \"After your  Covid-19 Test\"    [facility] providers: PLEASE REFER TO DE-ESCALATION AND RETESTING PROTOCOL  on [email or web address]    **The [employer] GeneXpert Xpress SARS-CoV-2 RT-PCR Test has been made  available for use under the Emergency Use Authorization (EUA) only.          SARS-CoV-2 Source NP Swab    Narrative:      Collected By: [id number] SABI DAILEY    Urinalysis [[id number]] Collected: 10/26/22 1200    Order Status: Completed Specimen: Urine, Clean Catch Updated: 10/26/22 1227     Color Yellow     Character Clear     Specific Gravity 1.010     Ph 5.0     Glucose Negative mg/dL      Ketones Negative mg/dL      Protein Negative mg/dL      Bilirubin Negative     Urobilinogen, Urine 0.2     Nitrite Negative     Leukocyte Esterase Negative     Occult Blood Negative     Micro Urine Req see below     Comment: Microscopic examination not performed when specimen is clear  and chemically negative for protein, blood, leukocyte esterase  and nitrite.         Narrative:      Collected By: [id number] PERRY WALKER  If not done within the last 24 hours  Collected By: [id number] PERRY WALKER  Indication for culture:->Evaluation for sepsis without a  clear source of infection    Urine Culture [[id number]] Collected: 10/26/22 1200    Order Status: Sent Specimen: Urine, Clean Catch Updated: 10/26/22 1213    Narrative:      Collected By: [id number] PERRY WALKER  If not done within the last 24 hours  Collected By: [id number] PERRY WALKER  Indication for " culture:->Evaluation for sepsis without a  clear source of infection    Blood Culture [[id number]]     Order Status: No result Specimen: Blood from Peripheral     Blood Culture [[id number]]     Order Status: No result Specimen: Blood from Peripheral     Culture Respiratory W/ GRM STN [[id number]]     Order Status: No result Specimen: Respirate from Sputum             IMAGING:   DX-CHEST-PORTABLE (1 VIEW)   Final Result      1.  Small to moderate right-sided pleural effusion with associated basilar atelectasis and/or consolidation.   2.  Left basilar atelectasis.   3.  Mild enlargement of the cardiomediastinal silhouette.      CT-CTA CHEST PULMONARY ARTERY W/ RECONS    (Results Pending)   EC-ECHOCARDIOGRAM COMPLETE W/O CONT    (Results Pending)       CONSULTS:   Cardiology    ASSESSMENT/PLAN:   67 y.o. male w/ PMH of COPD dependent on 4.5 L of O2, hypertension, dyslipidemia, CAD status post PCI of the LAD in 2017, HFrEF latest 50% in 2020 - s/p ICD placement, diabetes type 2, chronic back pain s/p multiple fusions, and recent history of RLE DVT transferred from [facility] for acute hypoxemic respiratory failure 2/2 to loculated right sided pleural effusion and severe sepsis.        #Acute hypoxemic respiratory failure  #Loculated R pleural effusion  #Right-sided pneumonia vs. obstructing mass  #S/p thoracentesis 2.4 L removed.  Baseline 4.5 L, currently on 4.5 L high flow.  CT at [place] Negative for PE  Called [facility] and current results for Pleural Fluid include:   *Protein 4.1  *Glucose 182  * -> 3suggestive of Exudative.   -Continue linezolid plus cefepime  -Follow-up on pleural fluid analysis  -Continue LAMA/LABA/ICS  -Continue prednisone 40 mg daily x5 days.  -Repeat CTA Pending: FU on pleural effusion and R/O PE  -Will call [place] and try to obtain Plueral Analysis results      #A. fib RVR  Unsure what caused this event: PE or Infectious.  10/26:   AICD shock upon standing  Received metoprolol 5 mg IV  x1  Amiodarone loading dose given  -Transitioned to oral 400 mg twice daily  -Started on digoxin  -Continue carvedilol  -Continue to monitor on telemetry      #CAD s/p PCI-LAD in 2017  #Acute on chronic HF  Hypervolemic  Cardiology following Dr. Salazar  -Continue diuresis with Lasix 40 mg twice daily  -Echocardiogram pending    #Recent DVT RLE  -Holding Anticoag for now  -Will restart when appropriate     #Severe Sepsis  Likely source: Pulmonary  -Continue IV antibiotics  -Daily CBC to Monitor  -FU on Blood Culture and Pleural Analysis    #Leukocytosis  10/27: 49  10/26: 54  Improving, continue antibiotics    #Lactic acidosis  10/26: 3.6  -Will repeat lactic acid level  -Will be cautious with IVF due to hypervolemic state.    #GERD  Imaging revealed Left Sided Abdominal Hernia  Worsening Acid Reflux   -Continue on PPI    #Hyponatremia  Likely 2/2 to hypervolemic state  Differentials include SIADH 2/2 to lung cancer, Drug/Med induced  -Continue to diurese, will monitor for improvement    #Diabetes Type 2  -Continue on sliding scale while inpatient          Core Measures:  Lines: PIV  Tubes: None  Diet: AHA  Abx: Linezolid and cefepime  DVT Ppx: SCDs, will transition to heparin when appropriate  GI Ppx: PPI  PCP: Dr. Pio Thompson  CODE STATUS: Full code    Dispo: Anticipate patient staying inpatient for right sided pleural effusion and severe sepsis management/treatment.      Christina Causey M.D.  PGY-1  [facility]

## 2022-10-27 NOTE — CONSULTS
Reason for Consult:  Asked by Dr Noe Amador M.D. to see this patient with atrial fibrillation rapid ventricular rate resulting in ICD firing  Patient's PCP: Pio Rogers M.D.    CC: Shortness of breath pneumonia transferred from outside hospital    HPI: 67-year-old gentleman with history of COPD on chronic oxygen CAD reduced ejection fraction with an ICD he had recently established this year with me and [facility] who [facility] to outside hospital with shortness of breath found to have a pleural effusion and pneumonia this required 2 hospitalizations and was treated with BiPAP at the outside hospital he was found to have DVT as well.  I saw him with the intermediate care unit team he has difficulty with IV access currently he also reports thrush after having received COPD treatment and steroids recently his heart rates 150s in atrial fibrillation flutter    Medications / Drug list prior to admission:  No current facility-administered medications on file prior to encounter.     Current Outpatient Medications on File Prior to Encounter   Medication Sig Dispense Refill    cyanocobalamin (VITAMIN B-12) 500 MCG Tab Take 500 mcg by mouth every day.      vitamin D3 (CHOLECALCIFEROL) 1000 Unit (25 mcg) Tab Take 1,000 Units by mouth every day.      oxyCODONE-acetaminophen (PERCOCET-10)  MG Tab Take 1 Tablet by mouth every four hours as needed for Severe Pain.      metFORMIN (GLUCOPHAGE) 500 MG Tab Take 500 mg by mouth 2 times a day with meals.      pregabalin (LYRICA) 25 MG Cap Take 25 mg by mouth 2 times a day.      furosemide (LASIX) 40 MG Tab Take 60 mg by mouth every day.      fluticasone-salmeterol (ADVAIR DISKUS) 250-50 MCG/ACT AEROSOL POWDER, BREATH ACTIVATED Inhale 1 Puff every 12 hours.      amoxicillin-clavulanate (AUGMENTIN) 875-125 MG Tab Take 1 Tablet by mouth 2 times a day. Started on 10/12 for 7 day course      azithromycin (ZITHROMAX) 250 MG Tab Take 250-500 mg by mouth every  day. Started on 10/12 for Zpack course x 5 days      albuterol 108 (90 Base) MCG/ACT Aero Soln inhalation aerosol Inhale 2 Puffs every 6 hours as needed for Shortness of Breath.      ferrous sulfate 325 (65 Fe) MG tablet Take 325 mg by mouth every day.      tiotropium (SPIRIVA RESPIMAT) 2.5 mcg/Act Aero Soln Inhale 5 mcg every day.      lisinopril (PRINIVIL) 2.5 MG Tab Take 1 Tab by mouth every day. 30 Tab 3    carvedilol (COREG) 25 MG Tab Take 1 Tab by mouth 2 times a day, with meals. 180 Tab 3    spironolactone (ALDACTONE) 25 MG Tab Take 1 Tab by mouth every day. 90 Tab 3    omeprazole (PRILOSEC) 40 MG delayed-release capsule Take 40 mg by mouth every day.      traZODone (DESYREL) 50 MG Tab Take 100 mg by mouth every evening.      aspirin EC 81 MG EC tablet Take 1 Tab by mouth every day. 30 Tab 3    atorvastatin (LIPITOR) 40 MG Tab Take 1 Tab by mouth every bedtime. 30 Tab 3       Current list of administered Medications:    Current Facility-Administered Medications:     senna-docusate (PERICOLACE or SENOKOT S) 8.6-50 MG per tablet 2 Tablet, 2 Tablet, Oral, BID **AND** polyethylene glycol/lytes (MIRALAX) PACKET 1 Packet, 1 Packet, Oral, QDAY PRN **AND** magnesium hydroxide (MILK OF MAGNESIA) suspension 30 mL, 30 mL, Oral, QDAY PRN **AND** bisacodyl (DULCOLAX) suppository 10 mg, 10 mg, Rectal, QDAY PRN, Noe Amador M.D.    acetaminophen (Tylenol) tablet 650 mg, 650 mg, Oral, Q6HRS PRN, Noe Amador M.D.    labetalol (NORMODYNE/TRANDATE) injection 10 mg, 10 mg, Intravenous, Q4HRS PRN, Noe Amador M.D.    ondansetron (ZOFRAN) syringe/vial injection 4 mg, 4 mg, Intravenous, Q4HRS PRN, Noe Amador M.D.    ondansetron (ZOFRAN ODT) dispertab 4 mg, 4 mg, Oral, Q4HRS PRN, Noe Amador M.D.    guaiFENesin dextromethorphan (ROBITUSSIN DM) 100-10 MG/5ML syrup 10 mL, 10 mL, Oral, Q6HRS PRN, Noe Amador M.D.    lactated ringers infusion (BOLUS), 500 mL, Intravenous, Once PRN, Noe Amador M.D.    Respiratory Therapy  Consult, , Nebulization, Continuous RT, Noe Amador M.D.    ipratropium-albuterol (DUONEB) nebulizer solution, 3 mL, Nebulization, Q4HRS (RT), Noe Amador M.D., 3 mL at 10/26/22 1309    ipratropium-albuterol (DUONEB) nebulizer solution, 3 mL, Nebulization, Q2HRS PRN (RT), Noe Amador M.D. MD Alert...Vancomycin per Pharmacy, , Other, PHARMACY TO DOSE, Noe Amador M.D.    piperacillin-tazobactam (Zosyn) 3.375 g in  mL IVPB, 3.375 g, Intravenous, Once, Last Rate: 200 mL/hr at 10/26/22 1704, 3.375 g at 10/26/22 1704 **AND** piperacillin-tazobactam (Zosyn) 3.375 g in  mL IVPB, 3.375 g, Intravenous, Q8HRS, Noe Amador M.D., Last Rate: 25 mL/hr at 10/26/22 1551, 3.375 g at 10/26/22 1551    albuterol inhaler 2 Puff, 2 Puff, Inhalation, Q6HRS PRN (RT), Noe Amador M.D.    [START ON 10/27/2022] aspirin EC (ECOTRIN) tablet 81 mg, 81 mg, Oral, DAILY, Noe Amador M.D.    atorvastatin (LIPITOR) tablet 40 mg, 40 mg, Oral, Q EVENING, Noe Amador M.D.    [Held by provider] carvedilol (COREG) tablet 25 mg, 25 mg, Oral, BID WITH MEALS, Noe Amador M.D.    [START ON 10/27/2022] ferrous sulfate tablet 325 mg, 325 mg, Oral, DAILY, Noe Amador M.D.    [Held by provider] lisinopril (PRINIVIL) tablet 2.5 mg, 2.5 mg, Oral, DAILY, Noe Amador M.D.    [START ON 10/27/2022] omeprazole (PRILOSEC) capsule 40 mg, 40 mg, Oral, DAILY, Noe Amador M.D.    [START ON 10/27/2022] spironolactone (ALDACTONE) tablet 25 mg, 25 mg, Oral, DAILY, Noe Amador M.D.    tiotropium (Spiriva Respimat) 2.5 mcg/Act inhalation spray 5 mcg, 5 mcg, Inhalation, DAILY, Noe Amador M.D.    traZODone (DESYREL) tablet 100 mg, 100 mg, Oral, Nightly, Noe Amador M.D.    vancomycin (VANCOCIN) 1,250 mg in  mL IVPB, 1,250 mg, Intravenous, Q12HR, Noe Amador M.D., Last Rate: 125 mL/hr at 10/26/22 1402, 1,250 mg at 10/26/22 1402    furosemide (LASIX) injection 20 mg, 20 mg, Intravenous, BID DIURETIC, Noe Amador M.D., 20 mg at  10/26/22 1311    predniSONE (DELTASONE) tablet 40 mg, 40 mg, Oral, DAILY, Noe Amador M.D.    pregabalin (LYRICA) capsule 25 mg, 25 mg, Oral, BID, oNe Amador M.D.    oxyCODONE-acetaminophen (PERCOCET-10)  MG per tablet 1 Tablet, 1 Tablet, Oral, Q6HRS PRN, Noe Amador M.D., 1 Tablet at 10/26/22 1419    insulin regular (HumuLIN R,NovoLIN R) injection, 1-6 Units, Subcutaneous, 4X/DAY ACHS **AND** POC blood glucose manual result, , , Q AC AND BEDTIME(S) **AND** NOTIFY MD and PharmD, , , Once **AND** Administer 20 grams of glucose (approximately 8 ounces of fruit juice) every 15 minutes PRN FSBG less than 70 mg/dL, , , PRN **AND** dextrose 50% (D50W) injection 25 g, 25 g, Intravenous, Q15 MIN PRN, Noe Amador M.D.    midodrine (PROAMATINE) tablet 5 mg, 5 mg, Oral, TID WITH MEALS, Noe Amador M.D.    dextrose 5% infusion, , Intravenous, Continuous, Noe Amador M.D., Last Rate: 30 mL/hr at 10/26/22 1459, New Bag at 10/26/22 1459    amiodarone (Nexterone) 360 mg/200 mL infusion, 0.5-1 mg/min, Intravenous, Continuous, Noe Amador M.D., Last Rate: 33 mL/hr at 10/26/22 1549, 1 mg/min at 10/26/22 1549    magnesium sulfate IVPB premix 2 g, 2 g, Intravenous, Once, Noe Amador M.D., Last Rate: 25 mL/hr at 10/26/22 1550, 2 g at 10/26/22 1550    potassium chloride SA (Kdur) tablet 40 mEq, 40 mEq, Oral, Once, Noe Amador M.D.    Metoprolol Tartrate (LOPRESSOR) injection 5 mg, 5 mg, Intravenous, Q5 MIN PRN, Noe Amador M.D.    Past Medical History:   Diagnosis Date    Coronary artery disease due to lipid rich plaque     Dyslipidemia     Essential hypertension     Former tobacco use - quit Sep 2017 in setting of STEMI     Heart burn     History of ST elevation myocardial infarction (STEMI) LAD September 2017     Indigestion     Pain 09-20-12    lower back, 8/10    Status post insertion of drug-eluting stent into left anterior descending artery        Past Surgical History:   Procedure Laterality Date    MARQUISE  "BY LAPAROSCOPY N/A 2015    Procedure: MARQUISE BY LAPAROSCOPY- With Grams ;  Surgeon: Sandro Obregon M.D.;  Location: SURGERY [facility];  Service:     LUMBAR DECOMPRESSION  2012    by Dr. Plata    FUSION, SPINE, LUMBAR, PLIF  2012    Performed by Mckayla Plata M.D. at SURGERY [facility]    LUMBAR DECOMPRESSION  2012    Performed by Mckayla Plata M.D. at SURGERY [facility]    CERVICAL DISK AND FUSION ANTERIOR  1997/2010    x 2       Family History   Problem Relation Age of Onset    Heart Attack Father     Stroke Father     Heart Attack Paternal Grandmother     Heart Attack Paternal Grandfather      Patient family history was personally reviewed, no pertinent family history to current presentation    Social History     Tobacco Use    Smoking status: Former     Packs/day: 2.00     Years: 35.00     Pack years: 70.00     Types: Cigarettes     Quit date: 2017     Years since quittin.1    Smokeless tobacco: Never   Vaping Use    Vaping Use: Former   Substance Use Topics    Alcohol use: No     Comment: hx; quit     Drug use: No       ALLERGIES:  Allergies   Allergen Reactions    Nkda [No Known Drug Allergy]        Review of systems:  A complete review of symptoms was reviewed with patient. This is reviewed in H&P and PMH. ALL OTHERS reviewed and negative    Physical exam:  Patient Vitals for the past 24 hrs:   BP Temp Temp src Pulse Resp SpO2 Height Weight   10/26/22 1600 120/69 -- -- 82 (!) 35 (!) 81 % -- --   10/26/22 1501 109/88 -- -- (!) 166 18 95 % -- --   10/26/22 1447 101/75 -- -- (!) 150 16 94 % -- --   10/26/22 1445 -- -- -- (!) 170 -- -- -- --   10/26/22 1442 110/78 -- -- (!) 170 16 94 % -- --   10/26/22 1437 (!) 112/90 -- -- (!) 183 16 94 % -- --   10/26/22 1432 -- -- -- (!) 160 16 94 % -- --   10/26/22 1309 -- -- -- (!) 107 20 93 % -- --   10/26/22 1200 -- -- -- -- -- -- 1.803 m (5' 11\") 114 kg (251 lb 5.2 oz)   10/26/22 1145 114/83 36.3 °C (97.3 °F) Temporal 100 18 " 96 % -- --   10/26/22 1139 -- -- -- -- -- -- -- 117 kg (257 lb 15 oz)     General: No acute distress.   EYES: no jaundice  HEENT: OP clear   Neck:  No JVD.   CVS: Tachycardic  Resp: Increased respiratory effort  Abdomen: Soft, ND, atrial fibrillation fib obesity  Skin: Grossly nothing acute no obvious rashes  Neurological: Alert, Moves all extremities  Extremities:   Mild edema. No cyanosis.       Data:  Laboratory studies personally reviewed by me:  Recent Results (from the past 24 hour(s))   Urinalysis    Collection Time: 10/26/22 12:00 PM    Specimen: Urine, Clean Catch   Result Value Ref Range    Color Yellow     Character Clear     Specific Gravity 1.010 <1.035    Ph 5.0 5.0 - 8.0    Glucose Negative Negative mg/dL    Ketones Negative Negative mg/dL    Protein Negative Negative mg/dL    Bilirubin Negative Negative    Urobilinogen, Urine 0.2 Negative    Nitrite Negative Negative    Leukocyte Esterase Negative Negative    Occult Blood Negative Negative    Micro Urine Req see below    Prothrombin time (INR)    Collection Time: 10/26/22 12:29 PM   Result Value Ref Range    PT 13.8 12.0 - 14.6 sec    INR 1.07 0.87 - 1.13   Procalcitonin    Collection Time: 10/26/22 12:29 PM   Result Value Ref Range    Procalcitonin 0.11 <0.25 ng/mL   Cortisol    Collection Time: 10/26/22 12:29 PM   Result Value Ref Range    Cortisol 9.6 0.0 - 23.0 ug/dL   CRP QUANTITIVE (NON-CARDIAC)    Collection Time: 10/26/22 12:29 PM   Result Value Ref Range    Stat C-Reactive Protein 3.00 (H) 0.00 - 0.75 mg/dL   LDH    Collection Time: 10/26/22 12:29 PM   Result Value Ref Range    LDH Total 318 (H) 107 - 266 U/L   CBC WITH DIFFERENTIAL    Collection Time: 10/26/22 12:29 PM   Result Value Ref Range    WBC 54.0 (HH) 4.8 - 10.8 K/uL    RBC 4.81 4.70 - 6.10 M/uL    Hemoglobin 14.0 14.0 - 18.0 g/dL    Hematocrit 41.4 (L) 42.0 - 52.0 %    MCV 86.1 81.4 - 97.8 fL    MCH 29.1 27.0 - 33.0 pg    MCHC 33.8 33.7 - 35.3 g/dL    RDW 40.6 35.9 - 50.0 fL     Platelet Count 348 164 - 446 K/uL    MPV 9.4 9.0 - 12.9 fL    Neutrophils-Polys 95.80 (H) 44.00 - 72.00 %    Lymphocytes 0.00 (L) 22.00 - 41.00 %    Monocytes 4.20 0.00 - 13.40 %    Eosinophils 0.00 0.00 - 6.90 %    Basophils 0.00 0.00 - 1.80 %    Nucleated RBC 0.00 /100 WBC    Neutrophils (Absolute) 51.73 (H) 1.82 - 7.42 K/uL    Lymphs (Absolute) 0.00 (L) 1.00 - 4.80 K/uL    Monos (Absolute) 2.27 (H) 0.00 - 0.85 K/uL    Eos (Absolute) 0.00 0.00 - 0.51 K/uL    Baso (Absolute) 0.00 0.00 - 0.12 K/uL    NRBC (Absolute) 0.00 K/uL   Comp Metabolic Panel    Collection Time: 10/26/22 12:29 PM   Result Value Ref Range    Sodium 126 (L) 135 - 145 mmol/L    Potassium 3.9 3.6 - 5.5 mmol/L    Chloride 85 (L) 96 - 112 mmol/L    Co2 28 20 - 33 mmol/L    Anion Gap 13.0 7.0 - 16.0    Glucose 191 (H) 65 - 99 mg/dL    Bun 48 (H) 8 - 22 mg/dL    Creatinine 1.15 0.50 - 1.40 mg/dL    Calcium 8.8 8.5 - 10.5 mg/dL    AST(SGOT) 16 12 - 45 U/L    ALT(SGPT) 20 2 - 50 U/L    Alkaline Phosphatase 62 30 - 99 U/L    Total Bilirubin 0.6 0.1 - 1.5 mg/dL    Albumin 3.2 3.2 - 4.9 g/dL    Total Protein 5.6 (L) 6.0 - 8.2 g/dL    Globulin 2.4 1.9 - 3.5 g/dL    A-G Ratio 1.3 g/dL   MAGNESIUM    Collection Time: 10/26/22 12:29 PM   Result Value Ref Range    Magnesium 2.0 1.5 - 2.5 mg/dL   PHOSPHORUS    Collection Time: 10/26/22 12:29 PM   Result Value Ref Range    Phosphorus 3.1 2.5 - 4.5 mg/dL   LACTIC ACID    Collection Time: 10/26/22 12:29 PM   Result Value Ref Range    Lactic Acid 3.6 (H) 0.5 - 2.0 mmol/L   Sed Rate    Collection Time: 10/26/22 12:29 PM   Result Value Ref Range    Sed Rate Westergren 5 0 - 20 mm/hour   TROPONIN    Collection Time: 10/26/22 12:29 PM   Result Value Ref Range    Troponin T 22 (H) 6 - 19 ng/L   proBrain Natriuretic Peptide, NT    Collection Time: 10/26/22 12:29 PM   Result Value Ref Range    NT-proBNP 516 (H) 0 - 125 pg/mL   ESTIMATED GFR    Collection Time: 10/26/22 12:29 PM   Result Value Ref Range    GFR (CKD-EPI) 69  >60 mL/min/1.73 m 2   DIFFERENTIAL MANUAL    Collection Time: 10/26/22 12:29 PM   Result Value Ref Range    Manual Diff Status PERFORMED    PERIPHERAL SMEAR REVIEW    Collection Time: 10/26/22 12:29 PM   Result Value Ref Range    Peripheral Smear Review see below    PLATELET ESTIMATE    Collection Time: 10/26/22 12:29 PM   Result Value Ref Range    Plt Estimation Normal    MORPHOLOGY    Collection Time: 10/26/22 12:29 PM   Result Value Ref Range    RBC Morphology Present     Poikilocytosis 2+     Echinocytes 2+    EKG    Collection Time: 10/26/22  2:06 PM   Result Value Ref Range    Report       [facility]    Test Date:  2022-10-26  Pt Name:    MIRLANDE DOZIER                  Department: [facility]  MRN:        [id number]                      Room:       S165  Gender:     Male                         Technician: [facility]  :        1955                   Requested By:GRISELDA BANERJEE  Order #:    [id number]                    Reading MD: José Rueda MD    Measurements  Intervals                                Axis  Rate:       123                          P:  [place]:                                      QRS:        -9  QRSD:       90                           T:          160  QT:         341  QTc:        488    Interpretive Statements  Atrial flutter with varied AV block,  Low voltage, precordial leads  Abnormal T, consider ischemia, lateral leads  Electronically Signed On 10- 14:55:11 PDT by José Rueda MD     EKG    Collection Time: 10/26/22  2:30 PM   Result Value Ref Range    Report       [facility]    Test Date:  2022-10-26  Pt Name:    MIRLANDE DOZIER                  Department: [facility]  MRN:        [id number]                      Room:       S165  Gender:     Male                         Technician: [facility]  :        1955                   Requested By:GRISELDA BANERJEE  Order #:    [id number]                    Reading MD: José Rueda MD    Measurements  Intervals                                 Axis  Rate:       174                          P:  NH:                                      QRS:        -13  QRSD:       92                           T:          145  QT:         270  QTc:        460    Interpretive Statements  Atrial fibrillation with rapid V-rate  Low voltage, precordial leads  Repolarization abnormality, prob rate related  Electronically Signed On 10- 14:48:22 PDT by José Rueda MD     COV-2, FLU A/B, AND RSV BY PCR (2-4 HOURS CEPHEID): Collect NP swab in VTM    Collection Time: 10/26/22  3:52 PM    Specimen: Nasal; Respirate   Result Value Ref Range    SARS-CoV-2 Source NP Swab        Imaging:  DX-CHEST-PORTABLE (1 VIEW)   Final Result      1.  Small to moderate right-sided pleural effusion with associated basilar atelectasis and/or consolidation.   2.  Left basilar atelectasis.   3.  Mild enlargement of the cardiomediastinal silhouette.      EC-ECHOCARDIOGRAM COMPLETE W/O CONT    (Results Pending)   CT-CTA CHEST PULMONARY ARTERY W/ RECONS    (Results Pending)           EKG tracings personally reviewed by me  [atrial fibrillation flutter        [facility] we will repeat he had reduced ejection fraction    All pertinent features of laboratory and imaging reviewed including primary images where applicable      Principal Problem:    Loculated pleural effusion POA: Unknown  Active Problems:    Former tobacco use - quit Sep 2017 in setting of STEMI (Chronic) POA: Yes    Severe sepsis (HCC) POA: Unknown    GERD (gastroesophageal reflux disease) POA: Yes    CAD (coronary artery disease) POA: Unknown    ICD (implantable cardioverter-defibrillator), Medtronic POA: Yes      Overview: July 2018: Medtronic Evera MRI XT  [id number] implanted by Dr. Ayon       ([facility]).    Parapneumonic effusion POA: Unknown    Pleural effusion POA: Yes    Chronic HFrEF (heart failure with reduced ejection fraction) (HCC) POA: Unknown    ACP (advance care planning) POA:  Unknown    Acute DVT (deep venous thrombosis) (HCC) POA: Unknown    Acute on chronic respiratory failure with hypoxia (HCC) POA: Unknown    Chronic pain syndrome POA: Unknown    Atrial fibrillation with RVR (HCC) POA: Unknown    V-tach POA: Unknown  Resolved Problems:    * No resolved hospital problems. *      Assessment / Plan:  Atrial fibrillation RVR  He has been started on amiodarone IV with improvement in his heart rates  We will add digoxin  Will transition to oral amiodarone  Added back some carvedilol he was on high dose  Start anticoagulation depending on plan for his lung    Acute on chronic heart failure reduced ejection fraction  Decompensated in setting of lung infection  Diuresis when able  Add back CHF regimen when he is healthy  Check echo      Coronary artery disease  Can stop aspirin in place of need for anticoagulation now with CHARLIE islas      I personally discussed his case with  Dr Noe Amador M.D. and Dr. Jerry Vidal who was at the bedside    It is my pleasure to participate in the care of Mr. Cervantes.  Please do not hesitate to contact me with questions or concerns.    Bart Salazar MD PhD [facility]  Cardiologist [facility]    10/26/2022    Please note that this dictation was created using voice recognition software. There may be errors I did not discover before finalizing the note.

## 2022-10-27 NOTE — PROGRESS NOTES
"  DATE: 10/27/2022    Hospital Day 2  loculated pleural effusion .    INTERVAL EVENTS:  Converted back to sinus rhythm overnight. Still only able to lie flat for brief periods, CT scan not able to be completed overnight. Right-sided chest tube placed at bedside as the patient would not be able to tolerate being supine for imaging-guided procedure.    REVIEW OF SYSTEMS: Review of systems is remarkable for the following dyspnea, malaise, fatigue, chest pain, palpitations. The remainder of the comprehensive ROS is negative with the exception of the aforementioned HPI, PMH, and PSH bullets in accordance with CMS guideline.    PHYSICAL EXAMINATION:      Vital Signs: /81   Pulse 100   Temp 36.1 °C (96.9 °F) (Oral)   Resp (!) 24   Ht 1.803 m (5' 11\")   Wt 109 kg (239 lb 13.8 oz)   SpO2 95%   Physical Exam  Vitals reviewed.   Constitutional:       Appearance: Normal appearance. He is ill-appearing.   HENT:      Head: Normocephalic and atraumatic.      Right Ear: External ear normal.      Left Ear: External ear normal.      Nose:      Comments: Nasal cannula in place     Mouth/Throat:      Mouth: Mucous membranes are dry.   Eyes:      General: No scleral icterus.     Pupils: Pupils are equal, round, and reactive to light.   Cardiovascular:      Rate and Rhythm: Regular rhythm. Tachycardia present.   Pulmonary:      Breath sounds: Examination of the right-middle field reveals decreased breath sounds. Examination of the right-lower field reveals decreased breath sounds. Decreased breath sounds present.   Chest:      Comments: Right chest tube in place with serosanguinous output, good tidaling, no air leak  Abdominal:      General: There is no distension.      Palpations: Abdomen is soft.      Tenderness: There is no abdominal tenderness. There is no guarding or rebound.   Genitourinary:     Comments: Deferred  Musculoskeletal:      Cervical back: Neck supple.      Right lower leg: Edema present.      Left lower " leg: Edema present.   Skin:     General: Skin is warm and dry.      Capillary Refill: Capillary refill takes less than 2 seconds.      Coloration: Skin is pale.   Neurological:      General: No focal deficit present.      Mental Status: He is alert and oriented to person, place, and time.   Psychiatric:         Mood and Affect: Mood normal.         Behavior: Behavior normal.       LABORATORY VALUES:   Recent Labs     10/26/22  1229 10/27/22  0514   WBC 54.0* 49.0*   RBC 4.81 5.07   HEMOGLOBIN 14.0 14.8   HEMATOCRIT 41.4* 43.0   MCV 86.1 84.8   MCH 29.1 29.2   MCHC 33.8 34.4   RDW 40.6 40.3   PLATELETCT 348 351   MPV 9.4 9.4     Recent Labs     10/26/22  1229 10/27/22  0514   SODIUM 126* 125*   POTASSIUM 3.9 4.7   CHLORIDE 85* 88*   CO2 28 29   GLUCOSE 191* 167*   BUN 48* 41*   CREATININE 1.15 1.13   CALCIUM 8.8 8.3*     Recent Labs     10/26/22  1229 10/27/22  0514   ASTSGOT 16 18   ALTSGPT 20 16   TBILIRUBIN 0.6 0.6   ALKPHOSPHAT 62 62   GLOBULIN 2.4 2.2   INR 1.07  --      Recent Labs     10/26/22  1229   INR 1.07        IMAGING:   EC-ECHOCARDIOGRAM COMPLETE W/ CONT   Final Result      DX-CHEST-PORTABLE (1 VIEW)   Final Result      1.  Small to moderate right-sided pleural effusion with associated basilar atelectasis and/or consolidation.   2.  Left basilar atelectasis.   3.  Mild enlargement of the cardiomediastinal silhouette.      CT-CTA CHEST PULMONARY ARTERY W/ RECONS    (Results Pending)   IR-CHEST TUBE-EMPYEMA RIGHT    (Results Pending)   DX-CHEST-LIMITED (1 VIEW)    (Results Pending)       ASSESSMENT AND PLAN:     * Loculated pleural effusion  Assessment & Plan  No imaging available for review from outside facility, recommend repeat CT chest with IV contrast.  Right chest tube placed at bedside today, continue to - 20 mm Hg suction.  Ok to start heparin drip two hours after chest tube placement.  Continue IV antibiotics as appropriate.  Further recommendations pending imagining results.            ____________________________________     Koby Hogue M.D.    DD: 10/27/2022  12:13 PM

## 2022-10-28 NOTE — CARE PLAN
Problem: Bronchopulmonary Hygiene  Goal: Increase mobilization of retained secretions  Description: Target End Date:  2 to 3 days    1.  Perform bronchopulmonary therapy as indicated by assessment  2.  Perform airway suctioning  3.  Perform actions to maintain patient airway  Outcome: Progressing   Pt is proactive using IS

## 2022-10-28 NOTE — PROGRESS NOTES
Cardiology Follow Up Progress Note    Date of Service  10/28/2022    Attending Physician  Jerry Vidal D.O.    Chief Complaint   Cardiology consultation for evaluation of A. fib RVR which triggered AICD shock.    HPI  Migue Cervantes is a 67 y.o. male with recent RLL pneumonia & COPD exacerbation (10/23/2022) requiring hospitalization to ICU in [place], required BiPAP treatment, recent right posterior tibial DVT, O2 dependent COPD, on 4 L of oxygen, prior PCI, HFrEF EF 30%, AICD in situ, type 2 diabetes, chronic pain syndrome admitted 10/26/2022 from [facility] with right middle and lower lobe pneumonia with parapneumonic effusion and loculation.    Interim Events  No overnight cardiac events.  Telemetry-maintaining NSR on amiodarone.  Echocardiogram poor quality, minimally depressed.  CTA chest pending  S/p chest tube 10/27, over1 L of bloody pleural fluid drainage, pathology pending.  Sodium 119, hold furosemide, close monitoring.  Review of Systems  Review of Systems   Respiratory:  Negative for cough, choking, chest tightness, wheezing and stridor.      Vital signs in last 24 hours  Temp:  [35.9 °C (96.7 °F)-36.9 °C (98.4 °F)] 36 °C (96.8 °F)  Pulse:  [] 89  Resp:  [14-64] 14  BP: ()/(57-83) 93/57  SpO2:  [90 %-95 %] 92 %    Physical Exam  Physical Exam  Cardiovascular:      Rate and Rhythm: Normal rate and regular rhythm.      Pulses: Normal pulses.   Pulmonary:      Breath sounds: No wheezing.   Skin:     General: Skin is warm.   Neurological:      Mental Status: He is alert. Mental status is at baseline.   Psychiatric:         Mood and Affect: Mood normal.       Lab Review  Lab Results   Component Value Date/Time    WBC 46.1 (H) 10/28/2022 12:57 PM    RBC 4.69 (L) 10/28/2022 12:57 PM    HEMOGLOBIN 13.8 (L) 10/28/2022 12:57 PM    HEMATOCRIT 40.3 (L) 10/28/2022 12:57 PM    MCV 85.9 10/28/2022 12:57 PM    MCH 29.4 10/28/2022 12:57 PM    MCHC 34.2 10/28/2022 12:57 PM    MPV 10.2 10/28/2022  12:57 PM      Lab Results   Component Value Date/Time    SODIUM 119 (LL) 10/28/2022 12:57 PM    POTASSIUM 5.1 10/28/2022 12:57 PM    CHLORIDE 84 (L) 10/28/2022 12:57 PM    CO2 23 10/28/2022 12:57 PM    GLUCOSE 310 (H) 10/28/2022 12:57 PM    BUN 57 (H) 10/28/2022 12:57 PM    CREATININE 1.27 10/28/2022 12:57 PM      Lab Results   Component Value Date/Time    ASTSGOT 17 10/28/2022 12:57 PM    ALTSGPT 14 10/28/2022 12:57 PM     Lab Results   Component Value Date/Time    CHOLSTRLTOT 129 09/12/2017 04:14 AM    LDL 69 09/12/2017 04:14 AM    HDL 27 (A) 09/12/2017 04:14 AM    TRIGLYCERIDE 167 (H) 09/12/2017 04:14 AM    TROPONINT 22 (H) 10/26/2022 12:29 PM       Recent Labs     10/26/22  1229   NTPROBNP 516*         Cardiac Imaging and Procedures Review  EKG:  My personal interpretation of the EKG dated 10/27/2022 is SR/ST.    Echocardiogram:   10/27/22  Left Ventricle  Left ventricle not adequately seen despite use of echocardiographic   contrast. Unable to accurately assess LVEF or ventricular structural   parameters. LV appears grossly reduced in function compared to prior   however.      Cardiac Catheterization:    CAD status post LAD territory MI 2017 with stent to prox LAD for 100% occlusion, 4x20 mm EBONIE, only luminal irregularities RCA/Circ. DAPT through 9-      Imaging  Chest X-Ray:    Small to moderate right-sided pleural effusion with associated basilar atelectasis and/or consolidation.  2.  Left basilar atelectasis.  3.  Mild enlargement of the cardiomediastinal silhouette.    Stress Test:  n/a    Assessment/Plan    #Inappropriate AICD shock for new A. fib RVR.  #Acute on chronic DHF in the setting of lung infection.  #Severe sepsis pneumonia with loculated right pleural effusion s/p chest tube 10/27, on Unasyn.  #Leukocytosis, WBC 49.  #AICD in situ for primary prevention, [employer], 7/2018.  #Known CAD PCI to LAD, 2017.  #Acute DVT.  Unable to start OAC secondary to recent chest tube placement status post  bloody pleural fluid drainage.  #Type 2 diabetes.  No A1c available  #Hyponatremia, worsening,     Recommendations  -Continue amiodarone 400 mg BID x2 weeks, then 200 mg daily.  -Continue digoxin 125 mcg m daily.  -Continue carvedilol 6.25 mg BID.  -Continue spironolactone 25 mg daily  -Continue furosemide 40 IV twice daily, hold 10/28/2022,  will evaluate BMP in the morning.  -Strict intake and output  -Keep K above 4, Mg above 2  -Resume lisinopril if only BP allows, Midodrine discontinued.  -S/p R tube thoracostomy, 10/27/22.  -Initiate OAC when chest tube out and no other contraindications.  -CTA chest pending.    .  Cardiology will follow along.    I personally spent a total of 13  minutes which includes face-to-face time and non-face-to-face time spent on preparing to see the patient, reviewing hospital notes and tests, obtaining history from the patient, performing a medically appropriate exam, counseling and educating the patient, ordering medications/tests/procedures/referrals as clinically indicated, and documenting information in the electronic medical record.     Thank you for allowing me to participate in the care of this patient.  I will continue to follow this patient    Please contact me with any questions.    DORA Cook.   Cardiologist, [facility]  [phone]

## 2022-10-28 NOTE — THERAPY
Speech Language Pathology   Clinical Swallow Evaluation     Patient Name: Migue Cervantes  AGE:  67 y.o., SEX:  male  Medical Record #: [id number]  Today's Date: 10/28/2022        HPI: 68 y/o M admitted 10/26/22 with transfer from [facility] in [facility] with acute on chronic respiratory failure.  Patient has significant history of COPD and is baseline on 4 L of oxygen via nasal cannula.  In addition he has a history of coronary artery disease, history of heart failure but last echocardiogram here at [facility] showed EF of 50%, diabetes, chronic pain.     CXR 10/28: 1.  Pulmonary edema and/or infiltrates are identified, which are stable since the prior exam.  2.  Trace bilateral pleural effusions  3.  Cardiomegaly  4.  Atherosclerosis    The patient was recently hospitalized from 10/17- at [facility] for concern of pneumonia and COPD exacerbation.  During that time he was on steroids as well as antibiotics.  He also had a right sided thoracentesis with 2.5 L bloody fluid removed.  The patient was discharged but promptly returned to [place] [address] with worsening shortness of breath.  He was initiated on BiPAP and was found to have a WBC of 55,000.  A CTA chest was negative for pulmonary embolism.  He was found to have a right posterior tibial vein DVT with initiated on heparin drip.  Also on the CT was concerning for right middle and lower lobe pneumonia with parapneumonic effusion with potential loculation with the possibility of underlying or obstructing mass difficult to exclude.  Patient was transferred to [facility] for further evaluation.    Level of Consciousness: Alert  Affect/Behavior: Calm, Cooperative  Follows Directives: Yes  Orientation: Self, , General place, Situation  Hearing: Functional hearing  Vision: Functional vision      Prior Living Situation & Level of Function:  Regular/thin diet at baseline. Lives w/ spouse. [facility] a  [facility]      Oral Mechanism  "Evaluation  Facial Symmetry: Equal  Facial Sensation: Equal  Labial Observations: WFL  Lingual Observations: Midline  Dentition: Full dentures  Comments:      Voice  Quality: WFL  Resonance: WFL  Intensity: Appropriate  Cough: WFL  Comments:      Current Method of Nutrition   Oral diet (Regular/thin)      Subjective  Pt was received sitting upright in a chair with his daughter present. Patient reports no difficulty swallowing at baseline. He reports he had some difficulty when he had oral thrush due to pain/discomfort but that has improved. Patient also states he hasn't eaten much except for Jello, ice chips and water since his stomach hasn't felt good. He reports trying to have bowel movements but being limited by pain in his R chest when attempting to bear down.      Assessment  Positioning: Sitting [facility]  Bolus Administration: Patient  Oxygen Requirements:  5 L Nasal Cannula  Factor(s) Affecting Performance: None    Swallowing Trials  Ice: WFL  Thin Liquid (TN0): WFL  Pureed (PU4): WFL  Regular (RG7): WFL    Comments:  Baseline congested nonproductive cough noted prior to PO intake, which persisted throughout the evaluation though did not appear directly related to swallowing trials. Patient presents with intact bolus acceptance and containment. He is able to feed himself. Mastication of regular solids was prompt and thorough. Bolus formation and posterior lingual propulsion for bolus transit presumed intact as pt had no oral residue present post swallow upon examination. Pharyngeal swallow response appeared timely. No signs of pharyngeal inefficiency (e.g., multiple swallows) appreciated. No s/sx of aspiration occurred w/ PO intake, apart from baseline congested cough that occurred occasionally and was delayed (i.e., not immediately after swallowing). Pt denied having any difficulty swallowing and felt everything was \"going down\" okay.       Clinical Impressions  Patient presents with risk factors for dysphagia " "(hx COPD, RLL/RML PNA, hx GERD), though does not present with any clinical s/sx of oropharyngeal or esophageal dysphagia at bedside with PO intake and oral mech exam is normal without any focal deficits. Continuation of a regular/thin diet is advised. Given pt's risk factors for dysphagia, SLP will f/u to ensure diet tolerance. Should pt begin showing s/sx of aspiration or have worsening respiratory status, will consider an instrumental swallow study at that time.     Recommendations  1.  Continue regular/thin diet  2.  Instrumentation: None indicated at this time; will reconsider with any difficulty  3.  Swallowing Instructions & Precautions:   Supervision: Distant supervision - check on patient 2-3 times per meal  Positioning: Fully upright and midline during oral intake, Meals sitting upright in a chair, as tolerated; remain upright 30 mins after eating d/t hx GERD  Medication: Whole with liquid  Strategies: Small bites/sips, Slow rate of intake  Oral Care: after meals, before bed  4.  SLP will f/u to ensure diet tolerance.    Plan    Recommend Speech Therapy 3 times per week until therapy goals are met for the following treatments:  Dysphagia Training and Patient / Family / Caregiver Education.         Objective     10/28/22 6445   Patient / Family Goals   Patient / Family Goal #1 \"I've been having water and ice chips.\"   Short Term Goals   Short Term Goal # 1 Patient will consume meals of regular solids and thin liquids with no s/sx of aspiration.     "

## 2022-10-28 NOTE — CARE PLAN
Problem: Ineffective Airway Clearance  Goal: Patient will maintain patent airway with clear/clearing breath sounds  Outcome: Progressing     Problem: Skin Integrity  Goal: Skin integrity is maintained or improved  Outcome: Progressing   The patient is Watcher - Medium risk of patient condition declining or worsening    Shift Goals  Clinical Goals: wean O2, control pain  Patient Goals: improve breathing  Family Goals: control pain    Progress made toward(s) clinical / shift goals:  pt able to keep airway clear with a strong cough, maintained skin integrity    Patient is not progressing towards the following goals: pain has been worse post right chest tube insertion, unable to wean O2

## 2022-10-28 NOTE — PROGRESS NOTES
Hospital Medicine Daily Progress Note    Date of Service  10/28/2022    Chief Complaint  Worsening shortness of breath    Hospital Course  Migue Cervantes is a 67 y.o. male admitted 10/26/2022 with transfer from [facility] in [facility] with acute on chronic respiratory failure.  Patient has significant history of COPD and is baseline on 4 L of oxygen via nasal cannula.  In addition he has a history of coronary artery disease, history of heart failure but last echocardiogram here at [facility] showed EF of 50%, diabetes, chronic pain.    The patient was recently hospitalized from 10//23 at [facility] for concern of pneumonia and COPD exacerbation.  During that time he was on steroids as well as antibiotics.  He also had a right sided thoracentesis with 2.5 L bloody fluid removed.  The patient was discharged but promptly returned to [place] with worsening shortness of breath.  He was initiated on BiPAP and was found to have a WBC of 55,000.  A CTA chest was negative for pulmonary embolism.  He was found to have a right posterior tibial vein DVT with initiated on heparin drip.  Also on the CT was concerning for right middle and lower lobe pneumonia with parapneumonic effusion with potential loculation with the possibility of underlying or obstructing mass difficult to exclude.  Patient was transferred to [facility] for further evaluation.  During the patient's hospitalization at [facility] he was initiated on heparin drip.  This was placed on hold with concern of possible chest tube placement on 10/26.  Patient went to stand up and get mobilized to go to CT here at [facility] but developed atrial fibrillation with rapid ventricular rate.  A rapid response was called patient was initiated on amiodarone drip and was transferred to the [facility].    Interval Problem Update  Patient seen and examined today.  Data, Medication data reviewed.  Case discussed with nursing as available.  Plan of Care reviewed with patient and  notified of changes.   10/27: Patient converted to sinus tachycardia and amiodarone drip was stopped.  Patient has been off of heparin drip surgery has evaluated patient and a chest tube was placed with over 1 L of bloody pleural fluid drainage.  Patient is on 6 L via nasal cannula.  He is alert but appears ill.  His wife and daughters are at bedside.  Patient does complain of chronic back pain.  His speech is clear but short winded.  10/28 the patient remains with chest tube, the output has decreased, the fluid appears more serosanguineous, pathology currently pending, cultures so far negative, reviewed data from outlying facility, no pathology reported there.  Family at bedside,  Update given, echocardiogram here poor quality, did not comment on LVEF  Still significant leukocytosis, decreasing sodium level with ongoing diuretics  Chest tube without air leak  The patient feels overall better but still feels he cannot lay flat for a CT of the chest    I have discussed this patient's plan of care and discharge plan at IDT rounds today with Case Management, Nursing, Nursing leadership, and other members of the IDT team.    Consultants/Specialty  cardiology and general surgery    Code Status  Full Code    Disposition  Patient is not medically cleared for discharge.   Anticipate discharge to  be determined .  I have placed the appropriate orders for post-discharge needs.    Review of Systems  Review of Systems   Constitutional:  Positive for malaise/fatigue and weight loss. Negative for fever.   HENT:  Negative for sore throat.    Eyes:  Negative for discharge.   Respiratory:  Positive for cough and shortness of breath. Negative for sputum production.    Cardiovascular:  Positive for leg swelling. Negative for chest pain and palpitations.   Gastrointestinal:  Negative for abdominal pain and nausea.   Genitourinary:  Negative for dysuria.   Musculoskeletal:  Positive for back pain.   Neurological:  Negative for dizziness  and headaches.   Psychiatric/Behavioral:  The patient is nervous/anxious.       Physical Exam  Temp:  [36.1 °C (96.9 °F)-36.9 °C (98.4 °F)] 36.9 °C (98.4 °F)  Pulse:  [] 97  Resp:  [14-64] 16  BP: ()/(59-83) 105/63  SpO2:  [90 %-95 %] 94 %    Physical Exam  Vitals reviewed.   Constitutional:       Appearance: He is obese. He is ill-appearing. He is not diaphoretic.   HENT:      Head: Normocephalic and atraumatic.      Nose: Nose normal.      Mouth/Throat:      Mouth: Mucous membranes are moist.      Pharynx: No oropharyngeal exudate.   Eyes:      General: No scleral icterus.        Right eye: No discharge.         Left eye: No discharge.      Extraocular Movements: Extraocular movements intact.      Conjunctiva/sclera: Conjunctivae normal.   Neck:      Vascular: No carotid bruit.   Cardiovascular:      Rate and Rhythm: Normal rate and regular rhythm.      Pulses:           Radial pulses are 2+ on the right side and 2+ on the left side.        Dorsalis pedis pulses are 2+ on the right side and 2+ on the left side.      Heart sounds: No murmur heard.  Pulmonary:      Effort: Tachypnea and accessory muscle usage present. No respiratory distress.      Breath sounds: Decreased air movement present. Examination of the right-middle field reveals decreased breath sounds. Examination of the right-lower field reveals decreased breath sounds. Decreased breath sounds present. No wheezing or rales.      Comments: Right-sided chest tube  Abdominal:      General: Bowel sounds are normal. There is no distension.      Palpations: Abdomen is soft.      Tenderness: There is no abdominal tenderness.   Musculoskeletal:         General: No swelling or tenderness.      Cervical back: Neck supple. No tenderness. No muscular tenderness.      Right lower leg: Edema present.      Left lower leg: Edema present.   Skin:     Coloration: Skin is not jaundiced or pale.   Neurological:      General: No focal deficit present.      Mental  Status: He is alert and oriented to person, place, and time. Mental status is at baseline.      Cranial Nerves: No cranial nerve deficit.   Psychiatric:         Mood and Affect: Mood normal.         Behavior: Behavior normal.       Fluids    Intake/Output Summary (Last 24 hours) at 10/28/2022 0710  Last data filed at 10/28/2022 0656  Gross per 24 hour   Intake 224 ml   Output 3135 ml   Net -2911 ml         Laboratory  Recent Labs     10/26/22  1229 10/27/22  0514   WBC 54.0* 49.0*   RBC 4.81 5.07   HEMOGLOBIN 14.0 14.8   HEMATOCRIT 41.4* 43.0   MCV 86.1 84.8   MCH 29.1 29.2   MCHC 33.8 34.4   RDW 40.6 40.3   PLATELETCT 348 351   MPV 9.4 9.4       Recent Labs     10/26/22  1229 10/27/22  0514   SODIUM 126* 125*   POTASSIUM 3.9 4.7   CHLORIDE 85* 88*   CO2 28 29   GLUCOSE 191* 167*   BUN 48* 41*   CREATININE 1.15 1.13   CALCIUM 8.8 8.3*       Recent Labs     10/26/22  1229 10/27/22  1530   APTT  --  25.2   INR 1.07 1.12                 Imaging  DX-CHEST-PORTABLE (1 VIEW)   Final Result         1.  Pulmonary edema and/or infiltrates are identified, which are stable since the prior exam.   2.  Trace bilateral pleural effusions   3.  Cardiomegaly   4.  Atherosclerosis      DX-CHEST-LIMITED (1 VIEW)   Final Result      1.  Interval placement right-sided chest tube with tip barely within or just outside the pleural space. Associated right chest subcutaneous emphysema.   2.  Questionable tiny right apical pneumothorax.   3.  Small to moderate right-sided pleural effusion with associated basilar atelectasis and/or consolidation.   4.  Left basilar atelectasis. Possible layering left pleural effusion.   5.  Mild enlargement of the cardiomediastinal silhouette.      Findings conveyed to Dr. Vidal at 12:20 PM via Voalte messaging on 10/27/2022.      EC-ECHOCARDIOGRAM COMPLETE W/ CONT   Final Result      DX-CHEST-PORTABLE (1 VIEW)   Final Result      1.  Small to moderate right-sided pleural effusion with associated basilar  atelectasis and/or consolidation.   2.  Left basilar atelectasis.   3.  Mild enlargement of the cardiomediastinal silhouette.      CT-CTA CHEST PULMONARY ARTERY W/ RECONS    (Results Pending)          Assessment/Plan  * Loculated pleural effusion  Assessment & Plan  CTA chest at [place] 10/25/22: no PE, right middle and lower lobe pneumonias with parapneumonic effusion with loculation, possibility of an underlying or obstructing mass is difficult to exclude.  Attempting to get film room to upload images from [place] if able.    New CTA chest ordered.  Had 2.5 liters from right sided thoracocentesis at [place] (daughters showed me a picture of the bloody appearing pleural fluid in the vacuum bottles)  F/u echo  Continue antibiotics  Status post chest tube placement 10/27/2022 by Dr. Bonds.  Pleural fluid sent for cytology, pH, cell count, chemistry, LDH, gram stain and culture    Oral candidiasis- (present on admission)  Assessment & Plan  Recently on antibiotics and steroids  Nystatin ordered.    Hyponatremia  Assessment & Plan  Likely related to pulmonary condition, SIADH, diuretics      Atrial fibrillation with RVR (HCC)  Assessment & Plan  A. fib RVR, currently improved with medication regimen  He required a rapid response and amiodarone bolus then drip with conversion to sinus  Monitor on telemetry  Continue with amiodarone, digoxin, carvedilol  Cardiology consulting  Possible all related to right pleural effusion and acute on chronic respiratory failure    Chronic pain syndrome  Assessment & Plan  H/o cervical fusion and back surgeries  Home Percocet q 4 hr prn and lyrica 25mg daily  Added dilaudid after his chest tube placement 10/27    Acute on chronic respiratory failure with hypoxia (HCC)  Assessment & Plan  Dependent on 4 L at home  Advanced COPD with extensive tobacco abuse history  Nebs, pulm toilet, PEP  Diuresis as tolerates  No PE seen on CTA chest 10/25, he does RLE DVT   Restarted heparin   Follow-up  echo with poor visualization of left side  10/27/22 Echo:  CONCLUSIONS  Left ventricle not adequately seen despite use of echocardiographic   contrast.  Unable to accurately assess LVEF or ventricular structural parameters.  LV appears grossly reduced in function compared to prior however.  The right ventricle is normal in size and systolic function.  Normal inferior vena cava size and inspiratory collapse.    Acute DVT (deep venous thrombosis) ([facility])  Assessment & Plan  Recent right posterior tibial vein DVT at another facility  CTA chest negative PE 10/25...Given new event with afib with RVR upon standing and was off heparin, rescan CTA chest ordered, currently pending, the patient unable to tolerate  Restarting heparin drip       ACP (advance care planning)  Assessment & Plan  D/w pt -- FULL code    Chronic HFrEF (heart failure with reduced ejection fraction) ([facility])  Assessment & Plan  Repeat echo  Optimize HF meds    Parapneumonic effusion- (present on admission)  Assessment & Plan  Await pleural fluid analysis.  A chest tube was placed 10/27 by surgery.  Over 1 L of bloody fluid removed.    ICD (implantable cardioverter-defibrillator), [employer]- (present on admission)  Assessment & Plan  Per h/o    CAD (coronary artery disease)  Assessment & Plan  S/p remote PCI  ASA/statin/BB    GERD (gastroesophageal reflux disease)- (present on admission)  Assessment & Plan  PPI    Severe sepsis ([facility])- (present on admission)  Assessment & Plan  Antibiotics  Patient is on midodrine  Fluids with care given appearance of fluid overload  Following up on cultures and pleural fluid analysis.     Plan  Continue with antibiotic therapy  Continue with heparinization  Await cultures and pathology from pleural fluid  Reattempt CT chest once the patient has overall improved and is able to comply with exam  Appreciate surgery follow-up  See orders  Medically complex high-risk patient  Attempts to get records from outlying facility in  terms of pathology on pleural fluid previously removed  A.m. labs    VTE prophylaxis: SCDs/TEDs and therapeutic anticoagulation with heparin    I have performed a physical exam and reviewed and updated ROS and Plan today (10/28/2022). In review of yesterday's note (10/27/2022), there are no changes except as documented above.      Please note that this dictation was created using voice recognition software. I have made every reasonable attempt to correct obvious errors, but I expect that there are errors of grammar and possibly context that I did not discover before finalizing the note.

## 2022-10-28 NOTE — PROGRESS NOTES
"  DATE: 10/28/2022    INTERVAL EVENTS:  Right tube thoracostomy.    PHYSICAL EXAMINATION:  Vital Signs: /63   Pulse 97   Temp 36.9 °C (98.4 °F) (Temporal)   Resp 16   Ht 1.803 m (5' 11\")   Wt 107 kg (235 lb 10.8 oz)   SpO2 94%     Remains slightly dyspneic at rest.  Right chest tube with over 1 L of bloody serous output.  No air leak.    LABORATORY VALUES:   Recent Labs     10/26/22  1229 10/27/22  0514   WBC 54.0* 49.0*   RBC 4.81 5.07   HEMOGLOBIN 14.0 14.8   HEMATOCRIT 41.4* 43.0   MCV 86.1 84.8   MCH 29.1 29.2   MCHC 33.8 34.4   RDW 40.6 40.3   PLATELETCT 348 351   MPV 9.4 9.4     IMAGING:   Chest radiograph demonstrates partial resolution of right-sided effusion with residual consolidation in the right base.    ASSESSMENT AND PLAN:   Loculated right pleural effusion.  Partial resolution with right-sided tube thoracostomy.  Anticipate need for formal decortication.  Will need definitive CT imaging to assess adequacy of drainage and need for further operative intervention.       ____________________________________     Zhao Bundy M.D.    DD: 10/28/2022  7:32 AM    "

## 2022-10-28 NOTE — DIETARY
"Nutrition Services: Day 2 of admit.  Migue Cervantes is a 67 y.o. male with admitting DX of Pleural effusion, Atrial fibrillation with rapid ventricular response (HCC).    RD alerted to pt with Malnutrition Screening Score of 3:  Have you recently lost weight without trying? Yes       If yes, how much weight have you lost? 14-23 lb       Over what time period? 1 month       Have you been eating poorly because of a decreased appetite? Yes       SCORE 3 Abnormal          Assessment:  Height: 180.3 cm (5' 10.98\")  Weight: 107 kg (235 lb 10.8 oz)  Body mass index is 32.88 kg/m²., BMI classification: Class I obesity    Diet/Intake: Regular diet order 10/27 @ 1413. No meals recorded yet in ADLs.     Evaluation:   TRANSFER SUMMARY 10/25:  67-year-old male with history of oxygen dependent COPD, on 4 L, MI, stents placement, CHF with EF 30%, 2 diabetes, chronic pain.  He was admitted at outside facility from 10/17-10/23 with right lower lobe pneumonia and COPD exacerbation was treated with Zosyn and discharged on 10/23.  He returned to the hospital on 10/23 with worsening of chest pain and shortness of breath.  WBC 55K chest x-ray showed worsening of right-sided pleural effusion.  Thoracentesis was done with removal of 4.2 L from the right pleural cavity.  Patient required admission to ICU and BiPAP treatment.  D-dimer was elevated, but CT was postponed as patient was not able to lie flat.  Ultrasound of lower extremities showed right posterior tibial vein DVT and patient was started on IV heparin.  Subsequently respiratory status improved, patient undergone CTA of chest, as it was negative for PE, but did show worsening of right lower lobe and middle lobe pneumonia and loculated right-sided pleural effusion.  Patient has been treated with Levaquin and vancomycin.  WBC came down from 55 k to 42K.  Sputum culture growing gram-positive cocci.  He is currently on 4 L oxygen which is his baseline.  Requested transfer to this " "hospital for thoracic surgery consult for loculated pleural effusion.  \"he also reports thrush after having received COPD treatment and steroids recently\"    Weights during admission: 114 kg 10/26 via stand-up scale; 108.8 kg 10/27 via bed-scale; 106.9 kg 10/28 via bed-scale.  Interpretation: IV lasix BID since 10/26. S/p R chest tube placement 10/27. -4 L fluid per I/O.    Visited pt late this morning. Daughters present and helpful in providing answers to RD's questions.  PO intake has been <25% of last 3 meals. Pt endorses poor appetite and dislike for what has been served.   Provided menu and obtained order for upcoming meal. Also obtained some additional dietary preferences to promote improved PO intake for future meals.  Pt receptive to receiving Boost Glucose Control supplement 1x/day, as he was introduced to Glucerna supplements @ [facility] and enjoyed them.  Pt has had significantly reduced PO intake for ~1 month due to illnesses and hospitalizations (<50% of normal per dietary recall). As a result, pt has lost 20 lb (9.1 kg) unintentionally. Based on weight range from this admission, pt has lost 8-8.5% in 1 month, which represents SEVERE weight loss per ASPEN criteria.    Malnutrition Risk: Pt meets ASPEN criteria for severe acute illness related malnutrition in the setting of PNA, hospitalizations, and pleural effusion as evidenced by reduced PO intake (<50% for 1 month per dietary recall) and severe 8-8.5% weight loss in 1 month as a result.     Recommendations/Plan:  Encourage PO intake of preferred meals and Boost supplements.   Document PO intake % taken in ADLs.   Monitor weight.  Nutrition Rep will see pt for ongoing meal and snack preferences.     RD will monitor for improved PO intake.            "

## 2022-10-28 NOTE — ASSESSMENT & PLAN NOTE
Multifactorial  parapneumonic loculated pleural effusion on right, chest tube placed by surgery 10/27 and being managed by surgery  Known hx of heart failure  Atrial fib with RVR now rate controlled  COPD on O[facility] - [facility]air and spiriva    Improving slowly  Can taper prednisone now as no further wheezing  Nebs prn  IS q 1 hr while awake  Flutter tid

## 2022-10-29 NOTE — CARE PLAN
The patient is Watcher - Medium risk of patient condition declining or worsening    Shift Goals  Clinical Goals: wean O2, control pain  Patient Goals: improve breathing  Family Goals: control pain    Progress made toward(s) clinical / shift goals:      Patient is not progressing towards the following goals:

## 2022-10-29 NOTE — PROGRESS NOTES
Hospital Medicine Daily Progress Note    Date of Service  10/29/2022    Chief Complaint  Worsening shortness of breath    Hospital Course  Migue Cervantes is a 67 y.o. male admitted 10/26/2022 with transfer from [facility] in [facility] with acute on chronic respiratory failure.  Patient has significant history of COPD and is baseline on 4 L of oxygen via nasal cannula.  In addition he has a history of coronary artery disease, history of heart failure but last echocardiogram here at [facility] showed EF of 50%, diabetes, chronic pain.    The patient was recently hospitalized from 10//23 at [facility] for concern of pneumonia and COPD exacerbation.  During that time he was on steroids as well as antibiotics.  He also had a right sided thoracentesis with 2.5 L bloody fluid removed.  The patient was discharged but promptly returned to [place] with worsening shortness of breath.  He was initiated on BiPAP and was found to have a WBC of 55,000.  A CTA chest was negative for pulmonary embolism.  He was found to have a right posterior tibial vein DVT with initiated on heparin drip.  Also on the CT was concerning for right middle and lower lobe pneumonia with parapneumonic effusion with potential loculation with the possibility of underlying or obstructing mass difficult to exclude.  Patient was transferred to [facility] for further evaluation.  During the patient's hospitalization at [facility] he was initiated on heparin drip.  This was placed on hold with concern of possible chest tube placement on 10/26.  Patient went to stand up and get mobilized to go to CT here at [facility] but developed atrial fibrillation with rapid ventricular rate.  A rapid response was called patient was initiated on amiodarone drip and was transferred to the [facility].    Interval Problem Update  Patient seen and examined today.  Data, Medication data reviewed.  Case discussed with nursing as available.  Plan of Care reviewed with patient and  notified of changes.   10/27: Patient converted to sinus tachycardia and amiodarone drip was stopped.  Patient has been off of heparin drip surgery has evaluated patient and a chest tube was placed with over 1 L of bloody pleural fluid drainage.  Patient is on 6 L via nasal cannula.  He is alert but appears ill.  His wife and daughters are at bedside.  Patient does complain of chronic back pain.  His speech is clear but short winded.  10/28 the patient remains with chest tube, the output has decreased, the fluid appears more serosanguineous, pathology currently pending, cultures so far negative, reviewed data from outlying facility, no pathology reported there.  Family at bedside,  Update given, echocardiogram here poor quality, did not comment on LVEF  Still significant leukocytosis, decreasing sodium level with ongoing diuretics  Chest tube without air leak  The patient feels overall better but still feels he cannot lay flat for a CT of the chest  10/29 the patient afebrile, heart rate in 90s, respiration unlabored, he is on 5 L nasal cannula oxygen, normotensive, attempted to obtain pathology from [facility], apparently was not sent for cytology and there is no path report, just negative cultures, repeating the CAT scan of the chest today, mediastinal adenopathy, pleural effusion and pleural thickening as well as possible lower right lung abscess, loculated effusion.  Still significantly increased white cell count, sodium slightly better at 122, , dig 0.8, remains on heparin drip.  Family updated at the bedside.  Total chest tube output over the last 24 hours 270 cc, serosanguineous  I have discussed this patient's plan of care and discharge plan at IDT rounds today with Case Management, Nursing, Nursing leadership, and other members of the IDT team.    Consultants/Specialty  cardiology and general surgery    Code Status  Full Code    Disposition  Patient is not medically cleared for discharge.    Anticipate discharge to  be determined .  I have placed the appropriate orders for post-discharge needs.    Review of Systems  Review of Systems   Constitutional:  Positive for malaise/fatigue and weight loss. Negative for fever.   HENT:  Negative for sore throat.    Eyes:  Negative for discharge.   Respiratory:  Positive for cough and shortness of breath. Negative for sputum production.    Cardiovascular:  Positive for leg swelling. Negative for chest pain and palpitations.   Gastrointestinal:  Negative for abdominal pain and nausea.   Genitourinary:  Negative for dysuria.   Musculoskeletal:  Positive for back pain.   Neurological:  Negative for dizziness and headaches.   Psychiatric/Behavioral:  The patient is nervous/anxious.       Physical Exam  Temp:  [35.1 °C (95.2 °F)-36.5 °C (97.7 °F)] 36.2 °C (97.1 °F)  Pulse:  [] 98  Resp:  [14-44] 20  BP: ()/(50-71) 108/65  SpO2:  [93 %-98 %] 96 %    Physical Exam  Vitals reviewed.   Constitutional:       Appearance: He is obese. He is ill-appearing. He is not diaphoretic.   HENT:      Head: Normocephalic and atraumatic.      Nose: Nose normal.      Mouth/Throat:      Mouth: Mucous membranes are moist.      Pharynx: No oropharyngeal exudate.   Eyes:      General: No scleral icterus.        Right eye: No discharge.         Left eye: No discharge.      Extraocular Movements: Extraocular movements intact.      Conjunctiva/sclera: Conjunctivae normal.   Neck:      Vascular: No carotid bruit.   Cardiovascular:      Rate and Rhythm: Normal rate and regular rhythm.      Pulses:           Radial pulses are 2+ on the right side and 2+ on the left side.        Dorsalis pedis pulses are 2+ on the right side and 2+ on the left side.      Heart sounds: No murmur heard.  Pulmonary:      Effort: Tachypnea and accessory muscle usage present. No respiratory distress.      Breath sounds: Decreased air movement present. Examination of the right-middle field reveals decreased  breath sounds. Examination of the right-lower field reveals decreased breath sounds. Decreased breath sounds present. No wheezing or rales.      Comments: Right-sided chest tube  Abdominal:      General: Bowel sounds are normal. There is no distension.      Palpations: Abdomen is soft.      Tenderness: There is no abdominal tenderness.   Musculoskeletal:         General: No swelling or tenderness.      Cervical back: Neck supple. No tenderness. No muscular tenderness.      Right lower leg: Edema present.      Left lower leg: Edema present.   Skin:     Coloration: Skin is not jaundiced or pale.   Neurological:      General: No focal deficit present.      Mental Status: He is alert and oriented to person, place, and time. Mental status is at baseline.      Cranial Nerves: No cranial nerve deficit.   Psychiatric:         Mood and Affect: Mood normal.         Behavior: Behavior normal.       Fluids    Intake/Output Summary (Last 24 hours) at 10/29/2022 1355  Last data filed at 10/29/2022 1100  Gross per 24 hour   Intake 1039.7 ml   Output 1770 ml   Net -730.3 ml         Laboratory  Recent Labs     10/27/22  0514 10/28/22  1257 10/29/22  0930   WBC 49.0* 46.1* 38.7*   RBC 5.07 4.69* 4.66*   HEMOGLOBIN 14.8 13.8* 13.7*   HEMATOCRIT 43.0 40.3* 39.7*   MCV 84.8 85.9 85.2   MCH 29.2 29.4 29.4   MCHC 34.4 34.2 34.5   RDW 40.3 41.1 41.7   PLATELETCT 351 245 216   MPV 9.4 10.2 9.7       Recent Labs     10/27/22  0514 10/28/22  1257 10/29/22  0930   SODIUM 125* 119* 122*   POTASSIUM 4.7 5.1 4.8   CHLORIDE 88* 84* 84*   CO2 29 23 28   GLUCOSE 167* 310* 229*   BUN 41* 57* 53*   CREATININE 1.13 1.27 1.16   CALCIUM 8.3* 8.3* 8.6       Recent Labs     10/27/22  1530   APTT 25.2   INR 1.12                 Imaging  CT-CTA CHEST PULMONARY ARTERY W/ RECONS   Final Result      1.  No CT evidence for pulmonary emboli.   2.  Volume loss of RIGHT hemithorax with diffuse pleural thickening, moderate size loculated pleural fluid collection,  with possible RIGHT lower lobe lung abscess.   3.  Dense consolidation of the RIGHT middle and lower lobes with bronchial secretions.   4.  Mediastinal and RIGHT hilar adenopathy.               DX-CHEST-PORTABLE (1 VIEW)   Final Result         1.  Pulmonary edema and/or infiltrates are identified, which are stable since the prior exam.   2.  Trace bilateral pleural effusions   3.  Cardiomegaly   4.  Atherosclerosis      DX-CHEST-PORTABLE (1 VIEW)   Final Result         1.  Pulmonary edema and/or infiltrates are identified, which are stable since the prior exam.   2.  Trace bilateral pleural effusions   3.  Cardiomegaly   4.  Atherosclerosis      DX-CHEST-LIMITED (1 VIEW)   Final Result      1.  Interval placement right-sided chest tube with tip barely within or just outside the pleural space. Associated right chest subcutaneous emphysema.   2.  Questionable tiny right apical pneumothorax.   3.  Small to moderate right-sided pleural effusion with associated basilar atelectasis and/or consolidation.   4.  Left basilar atelectasis. Possible layering left pleural effusion.   5.  Mild enlargement of the cardiomediastinal silhouette.      Findings conveyed to Dr. Vidal at 12:20 PM via [employer] messaging on 10/27/2022.      EC-ECHOCARDIOGRAM COMPLETE W/ CONT   Final Result      DX-CHEST-PORTABLE (1 VIEW)   Final Result      1.  Small to moderate right-sided pleural effusion with associated basilar atelectasis and/or consolidation.   2.  Left basilar atelectasis.   3.  Mild enlargement of the cardiomediastinal silhouette.             Assessment/Plan  * Loculated pleural effusion  Assessment & Plan  CTA chest at [place] 10/25/22: no PE, right middle and lower lobe pneumonias with parapneumonic effusion with loculation, possibility of an underlying or obstructing mass is difficult to exclude.   S/p chest tube placement  Follow-up CT noted, surgery following  Had 2.5 liters from right sided thoracocentesis at [place] (daughters  showed me a picture of the bloody appearing pleural fluid in the vacuum bottles), apparently the fluid was not sent for for cytology/pathology, repeat attempts resulted in only negative culture  F/u echo noted  Continue antibiotics, adjust and follow-up cultures  Follow-up on pathology from pleural fluid    Oral candidiasis- (present on admission)  Assessment & Plan  Recently on antibiotics and steroids  Nystatin ordered.    Hyponatremia- (present on admission)  Assessment & Plan  Likely related to pulmonary condition, SIADH, diuretics      Atrial fibrillation with RVR (HCC)- (present on admission)  Assessment & Plan  A. fib RVR, currently improved with medication regimen  He required a rapid response and amiodarone bolus then drip with conversion to sinus  Monitor on telemetry  Continue with amiodarone, digoxin, carvedilol  Cardiology consulting  Possible all related to right pleural effusion and acute on chronic respiratory failure    Chronic pain syndrome- (present on admission)  Assessment & Plan  H/o cervical fusion and back surgeries  Home Percocet q 4 hr prn and lyrica 25mg daily  Added dilaudid after his chest tube placement 10/27    Acute on chronic respiratory failure with hypoxia (HCC)- (present on admission)  Assessment & Plan  Dependent on 4 L at home  Advanced COPD with extensive tobacco abuse history  Nebs, pulm toilet, PEP  Diuresis as tolerates  No PE seen on CTA chest 10/25, he does RLE DVT   Restarted heparin   Follow-up echo with poor visualization of left side  10/27/22 Echo:  CONCLUSIONS  Left ventricle not adequately seen despite use of echocardiographic   contrast.  Unable to accurately assess LVEF or ventricular structural parameters.  LV appears grossly reduced in function compared to prior however.  The right ventricle is normal in size and systolic function.  Normal inferior vena cava size and inspiratory collapse.    Acute DVT (deep venous thrombosis) ([facility])  Assessment & Plan  Recent  right posterior tibial vein DVT at another facility  CTA chest negative PE 10/25...Given new event with afib with RVR upon standing and was off heparin,   Repeat CTA negative for PE, positive for loculated pleural effusion and possible lung abscess  General surgery following      ACP (advance care planning)  Assessment & Plan  D/w pt -- FULL code    Chronic HFrEF (heart failure with reduced ejection fraction) (HCC)  Assessment & Plan  Repeat echo  Optimize HF meds    Parapneumonic effusion- (present on admission)  Assessment & Plan  Await pleural fluid analysis.  A chest tube was placed 10/27 by surgery.  Over 1 L of bloody fluid removed.    ICD (implantable cardioverter-defibrillator), [employer]- (present on admission)  Assessment & Plan  Per h/o    CAD (coronary artery disease)  Assessment & Plan  S/p remote PCI  ASA/statin/BB    GERD (gastroesophageal reflux disease)- (present on admission)  Assessment & Plan  PPI    Severe sepsis (HCC)- (present on admission)  Assessment & Plan  Antibiotics  Following up on cultures and pleural fluid analysis.     Plan  Continue with antibiotic therapy, follow cultures   Continue with heparinization  Await cultures and pathology from pleural fluid  follow-up CT noted, surgery following, will likely need pleurodesis/VATS  Appreciate surgery follow-up  See orders  Medically complex high-risk patient  Attempts to further correct sodium  A.m. labs    VTE prophylaxis: SCDs/TEDs and therapeutic anticoagulation with heparin    I have performed a physical exam and reviewed and updated ROS and Plan today (10/29/2022). In review of yesterday's note (10/28/2022), there are no changes except as documented above.      Please note that this dictation was created using voice recognition software. I have made every reasonable attempt to correct obvious errors, but I expect that there are errors of grammar and possibly context that I did not discover before finalizing the note.

## 2022-10-29 NOTE — CARE PLAN
Problem: Hemodynamics  Goal: Patient's hemodynamics, fluid balance and neurologic status will be stable or improve  Outcome: Progressing     Problem: Fall Risk  Goal: Patient will remain free from falls  Outcome: Progressing     Problem: Skin Integrity  Goal: Skin integrity is maintained or improved  Outcome: Progressing   The patient is Watcher - Medium risk of patient condition declining or worsening    Shift Goals  Clinical Goals: wean O2, control pain  Patient Goals: improve breathing  Family Goals: control pain    Progress made toward(s) clinical / shift goals:  pt has remained free from falls, hemodynamics have been stable, skin integrity has been maintained    Patient is not progressing towards the following goals: pain has not been controlled, oxygenation has not improved

## 2022-10-29 NOTE — PROGRESS NOTES
"  DATE: 10/29/2022    PHYSICAL EXAMINATION:  Vital Signs: BP (!) 93/67   Pulse 94   Temp 36.2 °C (97.1 °F) (Axillary)   Resp (!) 25   Ht 1.803 m (5' 10.98\")   Wt 106 kg (232 lb 12.9 oz)   SpO2 95%     Right chest tube with bloody serous drainage.  No air leak.    LABORATORY VALUES:   Recent Labs     10/27/22  0514 10/28/22  1257 10/29/22  0930   WBC 49.0* 46.1* 38.7*   RBC 5.07 4.69* 4.66*   HEMOGLOBIN 14.8 13.8* 13.7*   HEMATOCRIT 43.0 40.3* 39.7*   MCV 84.8 85.9 85.2   MCH 29.2 29.4 29.4   MCHC 34.4 34.2 34.5   RDW 40.3 41.1 41.7   PLATELETCT 351 245 216   MPV 9.4 10.2 9.7     Recent Labs     10/27/22  0514 10/28/22  1257 10/29/22  0930   SODIUM 125* 119* 122*   POTASSIUM 4.7 5.1 4.8   CHLORIDE 88* 84* 84*   CO2 29 23 28   GLUCOSE 167* 310* 229*   BUN 41* 57* 53*   CREATININE 1.13 1.27 1.16   CALCIUM 8.3* 8.3* 8.6     Recent Labs     10/26/22  1229 10/27/22  0514 10/27/22  1530 10/28/22  1257 10/29/22  0930   ASTSGOT 16 18  --  17 17   ALTSGPT 20 16  --  14 14   TBILIRUBIN 0.6 0.6  --  0.5 0.5   ALKPHOSPHAT 62 62  --  73 74   GLOBULIN 2.4 2.2  --  2.6 2.7   INR 1.07  --  1.12  --   --      IMAGING:   Chest radiograph demonstrates persistent right basilar consolidation, suspected empyema.    ASSESSMENT AND PLAN:     Loculated right pleural effusion.  Definitive surgical management predicated on CT imaging results.       ____________________________________     Zhao Bundy M.D.    DD: 10/29/2022  12:21 PM    "

## 2022-10-30 NOTE — OR NURSING
1724 patient recovered in PACU, x2 right midaxillary chest tubes in place connected to suction per order, no air leak in place; dressings are xeroform, gauze and medipore tape, dressing to previous chest tube on upper right chest; gauze and tegaderm, all dressings clean/dry/intact. Right subclavian central line in place xray complete for verification, right radial art line wnl and correlating however positional; armboard placed, patient arrived groggy, slow to wake up throughout PACU stay, able to wake up answer questions, denies pain and nausea overnight, returns to sleep, slight increase in work of breathing throughout recovery, Dr. Starks came to bedside and per MD patient should be in ICU overnight, bed order placed and awaited bed placement, report called to ARGENTINA De Jesus, wife updated via telephone; questions answered, plan discussed with patient, care transferred.

## 2022-10-30 NOTE — PROGRESS NOTES
Hospital Medicine Daily Progress Note    Date of Service  10/30/2022    Chief Complaint  Worsening shortness of breath    Hospital Course  Migue Cervantes is a 67 y.o. male admitted 10/26/2022 with transfer from [facility] with acute on chronic respiratory failure.  Patient has significant history of COPD and is baseline on 4 L of oxygen via nasal cannula.  In addition he has a history of coronary artery disease, history of heart failure but last echocardiogram here at [facility] showed EF of 50%, diabetes, chronic pain.    The patient was recently hospitalized from 10//23 at [facility] for concern of pneumonia and COPD exacerbation.  During that time he was on steroids as well as antibiotics.  He also had a right sided thoracentesis with 2.5 L bloody fluid removed.  The patient was discharged but promptly returned to [place] with worsening shortness of breath.  He was initiated on BiPAP and was found to have a WBC of 55,000.  A CTA chest was negative for pulmonary embolism.  He was found to have a right posterior tibial vein DVT with initiated on heparin drip.  Also on the CT was concerning for right middle and lower lobe pneumonia with parapneumonic effusion with potential loculation with the possibility of underlying or obstructing mass difficult to exclude.  Patient was transferred to [facility] for further evaluation.  During the patient's hospitalization at [facility] he was initiated on heparin drip.  This was placed on hold with concern of possible chest tube placement on 10/26.  Patient went to stand up and get mobilized to go to CT here at [facility] but developed atrial fibrillation with rapid ventricular rate.  A rapid response was called patient was initiated on amiodarone drip and was transferred to the [facility].    Interval Problem Update  Patient seen and examined today.  Data, Medication data reviewed.  Case discussed with nursing as available.  Plan of Care reviewed with patient and  notified of changes.   10/27: Patient converted to sinus tachycardia and amiodarone drip was stopped.  Patient has been off of heparin drip surgery has evaluated patient and a chest tube was placed with over 1 L of bloody pleural fluid drainage.  Patient is on 6 L via nasal cannula.  He is alert but appears ill.  His wife and daughters are at bedside.  Patient does complain of chronic back pain.  His speech is clear but short winded.  10/28 the patient remains with chest tube, the output has decreased, the fluid appears more serosanguineous, pathology currently pending, cultures so far negative, reviewed data from outlying facility, no pathology reported there.  Family at bedside,  Update given, echocardiogram here poor quality, did not comment on LVEF  Still significant leukocytosis, decreasing sodium level with ongoing diuretics  Chest tube without air leak  The patient feels overall better but still feels he cannot lay flat for a CT of the chest  10/29 the patient afebrile, heart rate in 90s, respiration unlabored, he is on 5 L nasal cannula oxygen, normotensive, attempted to obtain pathology from [facility], apparently was not sent for cytology and there is no path report, just negative cultures, repeating the CAT scan of the chest today, mediastinal adenopathy, pleural effusion and pleural thickening as well as possible lower right lung abscess, loculated effusion.  Still significantly increased white cell count, sodium slightly better at 122, , dig 0.8, remains on heparin drip.  Family updated at the bedside.  Total chest tube output over the last 24 hours 270 cc, serosanguineous  10/30 the patient feels slightly improved, still with chest tube, decreased output, CT findings discussed with surgery, plan is for decortication, VATS.  Remains with significantly elevated white cell count, although slightly better, sodium stable at 122,  Cultures remain negative, cytology pending    I have discussed  this patient's plan of care and discharge plan at IDT rounds today with Case Management, Nursing, Nursing leadership, and other members of the IDT team.    Consultants/Specialty  cardiology and general surgery    Code Status  Full Code    Disposition  Patient is not medically cleared for discharge.   Anticipate discharge to  be determined .  I have placed the appropriate orders for post-discharge needs.    Review of Systems  Review of Systems   Constitutional:  Positive for malaise/fatigue and weight loss. Negative for fever.   HENT:  Negative for sore throat.    Eyes:  Negative for discharge.   Respiratory:  Positive for cough and shortness of breath. Negative for sputum production.    Cardiovascular:  Positive for leg swelling. Negative for chest pain and palpitations.   Gastrointestinal:  Negative for abdominal pain and nausea.   Genitourinary:  Negative for dysuria.   Musculoskeletal:  Positive for back pain.   Neurological:  Negative for dizziness and headaches.   Psychiatric/Behavioral:  The patient is nervous/anxious.       Physical Exam  Temp:  [35.8 °C (96.4 °F)-36.2 °C (97.1 °F)] 35.8 °C (96.4 °F)  Pulse:  [] 87  Resp:  [14-44] 30  BP: ()/(50-67) 102/58  SpO2:  [91 %-96 %] 93 %    Physical Exam  Vitals reviewed.   Constitutional:       Appearance: He is obese. He is ill-appearing. He is not diaphoretic.   HENT:      Head: Normocephalic and atraumatic.      Nose: Nose normal.      Mouth/Throat:      Mouth: Mucous membranes are moist.      Pharynx: No oropharyngeal exudate.   Eyes:      General: No scleral icterus.        Right eye: No discharge.         Left eye: No discharge.      Extraocular Movements: Extraocular movements intact.      Conjunctiva/sclera: Conjunctivae normal.   Neck:      Vascular: No carotid bruit.   Cardiovascular:      Rate and Rhythm: Normal rate and regular rhythm.      Pulses:           Radial pulses are 2+ on the right side and 2+ on the left side.        Dorsalis  pedis pulses are 2+ on the right side and 2+ on the left side.      Heart sounds: No murmur heard.  Pulmonary:      Effort: Tachypnea and accessory muscle usage present. No respiratory distress.      Breath sounds: Decreased air movement present. Examination of the right-middle field reveals decreased breath sounds. Examination of the right-lower field reveals decreased breath sounds. Decreased breath sounds present. No wheezing or rales.      Comments: Right-sided chest tube  Abdominal:      General: Bowel sounds are normal. There is no distension.      Palpations: Abdomen is soft.      Tenderness: There is no abdominal tenderness.   Musculoskeletal:         General: No swelling or tenderness.      Cervical back: Neck supple. No tenderness. No muscular tenderness.      Right lower leg: Edema present.      Left lower leg: Edema present.   Skin:     Coloration: Skin is not jaundiced or pale.   Neurological:      General: No focal deficit present.      Mental Status: He is alert and oriented to person, place, and time. Mental status is at baseline.      Cranial Nerves: No cranial nerve deficit.   Psychiatric:         Mood and Affect: Mood normal.         Behavior: Behavior normal.       Fluids    Intake/Output Summary (Last 24 hours) at 10/30/2022 0938  Last data filed at 10/30/2022 0800  Gross per 24 hour   Intake 1851.2 ml   Output 1470 ml   Net 381.2 ml         Laboratory  Recent Labs     10/28/22  1257 10/29/22  0930 10/30/22  0820   WBC 46.1* 38.7* 32.9*   RBC 4.69* 4.66* 3.88*   HEMOGLOBIN 13.8* 13.7* 11.5*   HEMATOCRIT 40.3* 39.7* 33.1*   MCV 85.9 85.2 85.3   MCH 29.4 29.4 29.6   MCHC 34.2 34.5 34.7   RDW 41.1 41.7 41.7   PLATELETCT 245 216 166   MPV 10.2 9.7 9.8       Recent Labs     10/28/22  1257 10/29/22  0930 10/30/22  0820   SODIUM 119* 122* 122*   POTASSIUM 5.1 4.8 5.1   CHLORIDE 84* 84* 89*   CO2 23 28 25   GLUCOSE 310* 229* 239*   BUN 57* 53* 43*   CREATININE 1.27 1.16 0.94   CALCIUM 8.3* 8.6 7.9*        Recent Labs     10/27/22  1530   APTT 25.2   INR 1.12                 Imaging  DX-CHEST-PORTABLE (1 VIEW)   Final Result         1.  Pulmonary edema and/or infiltrates are identified, which are stable since the prior exam.   2.  At least small right and trace left pleural effusions   3.  Cardiomegaly   4.  Atherosclerosis      CT-CTA CHEST PULMONARY ARTERY W/ RECONS   Final Result      1.  No CT evidence for pulmonary emboli.   2.  Volume loss of RIGHT hemithorax with diffuse pleural thickening, moderate size loculated pleural fluid collection, with possible RIGHT lower lobe lung abscess.   3.  Dense consolidation of the RIGHT middle and lower lobes with bronchial secretions.   4.  Mediastinal and RIGHT hilar adenopathy.               DX-CHEST-PORTABLE (1 VIEW)   Final Result         1.  Pulmonary edema and/or infiltrates are identified, which are stable since the prior exam.   2.  Trace bilateral pleural effusions   3.  Cardiomegaly   4.  Atherosclerosis      DX-CHEST-PORTABLE (1 VIEW)   Final Result         1.  Pulmonary edema and/or infiltrates are identified, which are stable since the prior exam.   2.  Trace bilateral pleural effusions   3.  Cardiomegaly   4.  Atherosclerosis      DX-CHEST-LIMITED (1 VIEW)   Final Result      1.  Interval placement right-sided chest tube with tip barely within or just outside the pleural space. Associated right chest subcutaneous emphysema.   2.  Questionable tiny right apical pneumothorax.   3.  Small to moderate right-sided pleural effusion with associated basilar atelectasis and/or consolidation.   4.  Left basilar atelectasis. Possible layering left pleural effusion.   5.  Mild enlargement of the cardiomediastinal silhouette.      Findings conveyed to Dr. Vidal at 12:20 PM via [employer] messaging on 10/27/2022.      EC-ECHOCARDIOGRAM COMPLETE W/ CONT   Final Result      DX-CHEST-PORTABLE (1 VIEW)   Final Result      1.  Small to moderate right-sided pleural effusion with  associated basilar atelectasis and/or consolidation.   2.  Left basilar atelectasis.   3.  Mild enlargement of the cardiomediastinal silhouette.             Assessment/Plan  * Loculated pleural effusion  Assessment & Plan  CTA chest at [facility] 10/25/22: no PE, right middle and lower lobe pneumonias with parapneumonic effusion with loculation, possibility of an underlying or obstructing mass is difficult to exclude.   S/p chest tube placement  Follow-up CT noted, surgery following  Had 2.5 liters from right sided thoracocentesis at [facility] (daughters showed me a picture of the bloody appearing pleural fluid in the vacuum bottles), apparently the fluid was not sent for for cytology/pathology, repeat attempts resulted in only negative culture  F/u echo noted  Continue antibiotics, adjust and follow-up cultures  Follow-up on pathology from pleural fluid, is pending  The patient is headed for decortication with surgery  Will likely be on mechanical ventilation after or, discussed with intensivist    Oral candidiasis- (present on admission)  Assessment & Plan  Recently on antibiotics and steroids  Nystatin ordered.    Hyponatremia- (present on admission)  Assessment & Plan  Likely related to pulmonary condition, SIADH, diuretics      Atrial fibrillation with RVR (HCC)- (present on admission)  Assessment & Plan  A. fib RVR, currently improved with medication regimen  He required a rapid response and amiodarone bolus then drip with conversion to sinus  Monitor on telemetry  Continue with amiodarone, digoxin, carvedilol  Cardiology consulting  Possible all related to right pleural effusion and acute on chronic respiratory failure    Chronic pain syndrome- (present on admission)  Assessment & Plan  H/o cervical fusion and back surgeries  Home Percocet q 4 hr prn and lyrica 25mg daily  Added dilaudid after his chest tube placement 10/27    Acute on chronic respiratory failure with hypoxia (HCC)- (present on  admission)  Assessment & Plan  Dependent on 4 L at home  Advanced COPD with extensive tobacco abuse history  Nebs, pulm toilet, PEP  Diuresis as tolerates  No PE seen on CTA chest 10/25, he does RLE DVT   Restarted heparin   Follow-up echo with poor visualization of left side  10/27/22 Echo:  CONCLUSIONS  Left ventricle not adequately seen despite use of echocardiographic   contrast.  Unable to accurately assess LVEF or ventricular structural parameters.  LV appears grossly reduced in function compared to prior however.  The right ventricle is normal in size and systolic function.  Normal inferior vena cava size and inspiratory collapse.    Acute DVT (deep venous thrombosis) ([facility])- (present on admission)  Assessment & Plan  Recent right posterior tibial vein DVT at another facility  CTA chest negative PE 10/25...Given new event with afib with RVR upon standing and was off heparin,   Repeat CTA negative for PE, positive for loculated pleural effusion and possible lung abscess  General surgery following      ACP (advance care planning)- (present on admission)  Assessment & Plan  D/w pt -- FULL code    Chronic HFrEF (heart failure with reduced ejection fraction) ([facility])- (present on admission)  Assessment & Plan  Repeat echo  Optimize HF meds    Parapneumonic effusion- (present on admission)  Assessment & Plan  Await pleural fluid analysis.  A chest tube was placed 10/27 by surgery.  Over 1 L of bloody fluid removed.    ICD (implantable cardioverter-defibrillator), [employer]- (present on admission)  Assessment & Plan  Per h/o    CAD (coronary artery disease)- (present on admission)  Assessment & Plan  S/p remote PCI  ASA/statin/BB    GERD (gastroesophageal reflux disease)- (present on admission)  Assessment & Plan  PPI    Severe sepsis ([facility])- (present on admission)  Assessment & Plan  Antibiotics  Following up on cultures and pleural fluid analysis.     Plan  Plan for operating room today  Continue with antibiotic  therapy, follow cultures   Continue with heparinization after surgery  Await cultures and pathology from pleural fluid  See orders  Medically complex high-risk patient  Attempts to further correct sodium  A.m. labs    VTE prophylaxis: SCDs/TEDs and therapeutic anticoagulation with heparin    I have performed a physical exam and reviewed and updated ROS and Plan today (10/30/2022). In review of yesterday's note (10/29/2022), there are no changes except as documented above.      Please note that this dictation was created using voice recognition software. I have made every reasonable attempt to correct obvious errors, but I expect that there are errors of grammar and possibly context that I did not discover before finalizing the note.

## 2022-10-30 NOTE — CARE PLAN
The patient is Watcher - Medium risk of patient condition declining or worsening    Shift Goals  Clinical Goals: hemodynamic stability, stable respiratory status  Patient Goals: pain management  Family Goals: control pain    Progress made toward(s) clinical / shift goals:    Problem: Knowledge Deficit - Standard  Goal: Patient and family/care givers will demonstrate understanding of plan of care, disease process/condition, diagnostic tests and medications  Outcome: Progressing     Problem: Pain - Standard  Goal: Alleviation of pain or a reduction in pain to the patient’s comfort goal  Outcome: Progressing

## 2022-10-30 NOTE — ANESTHESIA PROCEDURE NOTES
Central Venous Line  Performed by: Camilo Leon M.D.  Authorized by: Camilo Leon M.D.     Start Time:  10/30/2022 1:35 PM  Patient Location:  OR  Indication: central venous access and hemodynamic monitoring        provider hand hygiene performed prior to central venous catheter insertion, all 5 sterile barriers used (gloves, gown, cap, mask, large sterile drape) during central venous catheter insertion and skin prep agent completely dried prior to procedure    Patient Position:  Trendelenburg  Laterality:  Right  Site:  Subclavian  Prep:  Chlorhexidine  Catheter Size:  7.5 Fr  Catheter Length (cm):  16  Number of Lumens:  Triple lumen  target vein identified, needle advanced into vein and blood aspirated and guidewire advanced into vein    Seldinger Technique?: Yes    Ultrasound-Guided: surface landmarks    Intravenous Verification: venous blood return and chest x-ray pending    all ports aspirated, all ports flushed easily, guidewire was removed intact, biopatch was applied, line was sutured in place and dressing was applied    Events: patient tolerated procedure well with no complications

## 2022-10-30 NOTE — PROGRESS NOTES
PREOPERATIVE NOTE: I have seen and examined the patient today. Critical physical examination findings, laboratory indices, and radiographic studies reviewed.  He underwent CT imaging of the chest yesterday which confirmed a loculated right pleural effusion with near complete collapse of the right middle and lower lobes and associated pleural thickening. I recommend a right posterior lateral thoracotomy and decortication.    The operative strategy was explained and reviewed.  Nonsurgical alternatives, such as additional tube thoracostomy and pleuralysis, discussed but not advocated. Potential complications including, but not limited to, infection, bleeding, need for blood transfusion.  Damage to adjacent structures, prolonged air leak, recurrent empyema, need for prolonged mechanical ventilation, anesthetic complications, and death were discussed. Questions were elicited and answered to the patient and his family's satisfaction.  Operative consent signed.  Correct operative site verified and marked.       ____________________________________     Zhao Bundy M.D.    DD: 10/30/2022  9:32 AM

## 2022-10-30 NOTE — OP REPORT
DATE OF OPERATION:  10/30/2022    PREOPERATIVE DIAGNOSIS: Community acquired pneumonia. Empyema, right side.    POSTOPERATIVE DIAGNOSIS: Community acquired pneumonia. Empyema, right side.     PROCEDURE PERFORMED: Right posterolateral thoracotomy. Right pulmonary decortication, partial. Minimal decortication of right upper lobe and right middle lobe surface.     SURGEON:    Zhao Bundy M.D.    ASSISTANT:    MELISSA Thayer.     ANESTHESIOLOGIST:  Camilo Leon M.D.    ANESTHESIA:   General endotracheal anesthesia.    ASA CLASSIFICATION:  IV. Emergent.    INDICATIONS: The patient is a 67 year-old elderly man with a loculated right pleural effusion associated with recent community-acquired pneumonia.  Preoperative CT imaging demonstrated an extensively loculated right pleural effusion with trapped right middle and lower lobes despite tube thoracostomy. He is taken to the operating room for right-sided thoracotomy and decortication.    The nature of the surgical procedure warranted additional skilled operative assistance from an Advanced Registered Nurse Practitioner (ARNP). The assistant was present during the entire operation. The surgical assistant performed the following: provided assistance with optimal surgical exposure of the operative field, assisted with the surgical decortication, assisted with the chest wall closure, and performed the skin closure and dressing application.    FINDINGS: A moderate amount of loculated inferior fluid, predominantly serous in nature and slightly bloody.  No significant amounts of purulence.  The right middle lobe and right lower lobe had a minimal amount of inflammatory peel.  Both lobes of the lung were firm, hard, and of gritty consistency.  There was generalized neovascularity overlying the visceral pleura..     WOUND CLASSIFICATION:  Class IV, Dirty or Infected. Infection present at the time of surgery (PATOS).    SPECIMEN:     Pleural peel for Gram  stain, culture, and sensitivity.  Pleural peel for permanent.  Right middle lobe core needle biopsy for permanent.    ESTIMATED BLOOD LOSS:  40 mL.    PROCEDURE: Following informed consent consent, the patient was properly identified, taken to the operating room and placed in supine position where general endotracheal anesthesia was administered. Intravenous antibiotics were administered in the preoperative setting within the correct time interval. A Tay catheter was aseptically placed. Sequential compression devices were employed. The patient was repositioned into a lateral decubitus position with careful attention to padding of the extremities and placement of an axillary roll. The right chest wall was prepped and draped into a sterile field.     A right posterolateral thoracotomy incision was made in the 5th intercostal space. The soft tissue layers were divided with electrocautery. The rib was divided posteriorly. A  Tuffier retractor was used to facilitate exposure. The loculated effusion was broken down bluntly. The pleural space was developed and the lung fully mobilized. Decortication was then carried out upon the right right upper lobe and right middle lobe. Specimen was obtained for culture.  A portion of parietal peel sent for permanent pathology.  A pair of right upper lobe parenchymal Alejandro-Cut core needle biopsies were sent for permanent pathology.    The operative field was copiously irrigated. A pair of 24-Kiswahili flexible Rene drains were placed into the dependent portions of the thoracic cavity and secured to the skin at separate inferior stab wound sites with 2-0 PERMAHAND® Silk sutures.     The ribs were approximated with multiple interrupted #1 VICRYL® Plus Antibacterial sutures. The soft tissues were closed in layers with running 2-0 VICRYL® Plus Antibacterial sutures and the skin was approximated with staples. A sterile dressing was applied. The chest tubes were connected to closed suction  drainage.     The patient tolerated the procedure well, and there were no apparent complications. All sponge, needle, and instrument counts were correct on 2 separate occasions. The patient was awakened, extubated, and transferred to  to the post anesthesia care unit (PACU) in hemodynamically stable condition.       ____________________________________     Zhao Bundy M.D.    DD: 10/30/2022  2:58 PM

## 2022-10-30 NOTE — ANESTHESIA PREPROCEDURE EVALUATION
Case: [id number] Date/Time: 10/30/22 1215    Procedures:       THORACOTOMY (Right)      DECORTICATION, LUNG    Pre-op diagnosis: loculated right pleural effusion     Location: [facility] OR 11 / SURGERY [facility]    Surgeons: Zhao Bundy M.D.          Relevant Problems   PULMONARY   (positive) COPD (chronic obstructive pulmonary disease) (CMS-[facility])      NEURO   (positive) History of ST elevation myocardial infarction (STEMI) LAD 2017   (positive) History of congestive heart failure      CARDIAC   (positive) Acute DVT (deep venous thrombosis) ([facility])   (positive) Atrial fibrillation with RVR ([facility])   (positive) CAD (coronary artery disease)      GI   (positive) GERD (gastroesophageal reflux disease)      Other   (positive) Acute on chronic respiratory failure with hypoxia ([facility])   (positive) Chronic HFrEF (heart failure with reduced ejection fraction) ([facility])   (positive) ICD (implantable cardioverter-defibrillator), [employer]   (positive) Lactic acidosis   (positive) Loculated pleural effusion   (positive) Pleural effusion   (positive) Severe sepsis ([facility])     Anes H&P:  PAST MEDICAL HISTORY:   67 y.o. male who presents for Procedure(s) (LRB):  THORACOTOMY (Right)  DECORTICATION, LUNG.  He has current and past medical problems significant for:    Past Medical History:   Diagnosis Date   • Coronary artery disease due to lipid rich plaque    • Dyslipidemia    • Essential hypertension    • Former tobacco use - quit Sep 2017 in setting of STEMI    • Heart burn    • History of ST elevation myocardial infarction (STEMI) LAD 2017    • Indigestion    • Pain 12    lower back, 8/10   • Status post insertion of drug-eluting stent into left anterior descending artery        SMOKING/ALCOHOL/RECREATIONAL DRUG USE:  Social History     Tobacco Use   • Smoking status: Former     Packs/day: 2.00     Years: 35.00     Pack years: 70.00     Types: Cigarettes     Quit date: 2017     Years since quittin.1   •  Smokeless tobacco: Never   Vaping Use   • Vaping Use: Former   Substance Use Topics   • Alcohol use: No     Comment: hx; quit 2008   • Drug use: No     Social History     Substance and Sexual Activity   Drug Use No       PAST SURGICAL HISTORY:  Past Surgical History:   Procedure Laterality Date   • MARQUISE BY LAPAROSCOPY N/A 12/28/2015    Procedure: MARQUISE BY LAPAROSCOPY- With Grams ;  Surgeon: Sandro Obregon M.D.;  Location: SURGERY [facility];  Service:    • LUMBAR DECOMPRESSION  9/25/2012    by Dr. Plata   • FUSION, SPINE, LUMBAR, PLIF  9/25/2012    Performed by Mckayla Plata M.D. at SURGERY [facility]   • LUMBAR DECOMPRESSION  9/25/2012    Performed by Mckayla Plata M.D. at SURGERY [facility]   • CERVICAL DISK AND FUSION ANTERIOR  1997/2010    x 2       ALLERGIES:   Allergies   Allergen Reactions   • Nkda [No Known Drug Allergy]        MEDICATIONS:  No current facility-administered medications on file prior to encounter.     Current Outpatient Medications on File Prior to Encounter   Medication Sig Dispense Refill   • Home Care Oxygen Inhale 4 L/min continuous. DME: B&B     • cyanocobalamin (VITAMIN B-12) 500 MCG Tab Take 500 mcg by mouth every day.     • vitamin D3 (CHOLECALCIFEROL) 1000 Unit (25 mcg) Tab Take 1,000 Units by mouth every day.     • oxyCODONE-acetaminophen (PERCOCET-10)  MG Tab Take 1 Tablet by mouth every four hours as needed for Severe Pain.     • metFORMIN (GLUCOPHAGE) 500 MG Tab Take 500 mg by mouth 2 times a day with meals.     • pregabalin (LYRICA) 25 MG Cap Take 25 mg by mouth 2 times a day.     • furosemide (LASIX) 40 MG Tab Take 60 mg by mouth every day.     • fluticasone-salmeterol (ADVAIR DISKUS) 250-50 MCG/ACT AEROSOL POWDER, BREATH ACTIVATED Inhale 1 Puff every 12 hours.     • amoxicillin-clavulanate (AUGMENTIN) 875-125 MG Tab Take 1 Tablet by mouth 2 times a day. Started on 10/12 for 7 day course     • azithromycin (ZITHROMAX) 250 MG Tab Take 250-500 mg by mouth  every day. Started on 10/12 for Zpack course x 5 days     • albuterol 108 (90 Base) MCG/ACT Aero Soln inhalation aerosol Inhale 2 Puffs every 6 hours as needed for Shortness of Breath.     • ferrous sulfate 325 (65 Fe) MG tablet Take 325 mg by mouth every day.     • tiotropium (SPIRIVA RESPIMAT) 2.5 mcg/Act Aero Soln Inhale 5 mcg every day.     • lisinopril (PRINIVIL) 2.5 MG Tab Take 1 Tab by mouth every day. 30 Tab 3   • carvedilol (COREG) 25 MG Tab Take 1 Tab by mouth 2 times a day, with meals. 180 Tab 3   • spironolactone (ALDACTONE) 25 MG Tab Take 1 Tab by mouth every day. 90 Tab 3   • omeprazole (PRILOSEC) 40 MG delayed-release capsule Take 40 mg by mouth every day.     • traZODone (DESYREL) 50 MG Tab Take 100 mg by mouth every evening.     • aspirin EC 81 MG EC tablet Take 1 Tab by mouth every day. 30 Tab 3   • atorvastatin (LIPITOR) 40 MG Tab Take 1 Tab by mouth every bedtime. 30 Tab 3       LABS:  Lab Results   Component Value Date/Time    HEMOGLOBIN 11.5 (L) 10/30/2022 0820    HEMATOCRIT 33.1 (L) 10/30/2022 0820    WBC 32.9 (H) 10/30/2022 0820     Lab Results   Component Value Date/Time    SODIUM 122 (L) 10/30/2022 0820    POTASSIUM 5.1 10/30/2022 0820    CHLORIDE 89 (L) 10/30/2022 0820    CO2 25 10/30/2022 0820    GLUCOSE 239 (H) 10/30/2022 0820    BUN 43 (H) 10/30/2022 0820    CALCIUM 7.9 (L) 10/30/2022 0820         PREVIOUS ANESTHETICS: See EMR  __________________________________________      Physical Exam    Airway   Mallampati: I  TM distance: >3 FB  Neck ROM: full       Cardiovascular - normal exam  Rhythm: regular  Rate: normal  (-) murmur     Dental - normal exam  (+) upper dentures, lower dentures        Facial Hair   Pulmonary - normal exam  Breath sounds clear to auscultation     Abdominal    Neurological - normal exam                 Anesthesia Plan    ASA 4- EMERGENT   ASA physical status 4 criteria: sepsisASA physical status emergent criteria: sepsis    Plan - general       Airway plan will be  ETT          Induction: intravenous    Postoperative Plan: Postoperative administration of opioids is intended.    Pertinent diagnostic labs and testing reviewed    Informed Consent:    Anesthetic plan and risks discussed with patient.    Use of blood products discussed with: patient whom consented to blood products.

## 2022-10-30 NOTE — ANESTHESIA PROCEDURE NOTES
Arterial Line  Performed by: Camilo Leon M.D.  Authorized by: Camilo Leon M.D.     Start Time:  10/30/2022 1:20 PM  Localization: surface landmarks    Patient Location:  OR  Indication: continuous blood pressure monitoring        Catheter Size:  20 G  Seldinger Technique?: Yes    Laterality:  Left  Site:  Radial artery  Line Secured:  Antimicrobial disc, tape and transparent dressing  Events: patient tolerated procedure well with no complications

## 2022-10-30 NOTE — ASSESSMENT & PLAN NOTE
On amiodarone  Heparin gtt (also for DVT)  - no bolus / no re-bolus given recent surgery    HR is moderately well controlled on digoxin

## 2022-10-30 NOTE — ANESTHESIA PROCEDURE NOTES
Airway    Date/Time: 10/30/2022 1:14 PM  Performed by: Camilo Leon M.D.  Authorized by: Camilo Leon M.D.     Location:  OR  Urgency:  Elective  Difficult Airway: No    Indications for Airway Management:  Anesthesia      Spontaneous Ventilation: absent    Sedation Level:  Deep  Preoxygenated: Yes    Patient Position:  Sniffing  Mask Difficulty Assessment:  0 - not attempted  Final Airway Type:  Endotracheal airway  Final Endotracheal Airway:  ETT  Cuffed: Yes    Technique Used for Successful ETT Placement:  Direct laryngoscopy    Insertion Site:  Oral  Blade Type:  [place]  Laryngoscope Blade/Videolaryngoscope Blade Size:  2  ETT Size (mm):  8.0  Measured from:  Teeth  ETT to Teeth (cm):  23  Placement Verified by: auscultation and capnometry    Cormack-Lehane Classification:  Grade I - full view of glottis  Number of Attempts at Approach:  1

## 2022-10-30 NOTE — ASSESSMENT & PLAN NOTE
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

## 2022-10-30 NOTE — ASSESSMENT & PLAN NOTE
Reduced LV function on echocardiogram, poor windows  Maintain euvolemia perioperatively    Continue digoxin  Cold in extremities today  Add lasix and follow clinically

## 2022-10-30 NOTE — ASSESSMENT & PLAN NOTE
History of COPD and chronic hypoxia on supple oxygen 4.5 L  Continue bronchodilators, RT protocols

## 2022-10-30 NOTE — ASSESSMENT & PLAN NOTE
Heparin infusion held for surgery 10/30  Ok to resume 10/31 with no bolus and no re-bolus  Xa monitoring

## 2022-10-30 NOTE — ASSESSMENT & PLAN NOTE
RLL pulmonary abscess with empyema versus underlying malignancy  - gram stain negative thus far  - cultures negative thus far  - awaiting pathology and following up on cultures as days go on    Right thoracotomy and decortication with lung core biopsy sent 10/30  Continue antibiotic therapy with Unasyn  Follow-up chest imaging, WBC and culture/pathology  Surgery following    Effusion is malignant in nature secondary to NSCC of right lung  Severe consolidation of RML/RLL, likely due to obstructive disease from lung CA, possibly from secretions though given longevity I suspect not

## 2022-10-30 NOTE — ASSESSMENT & PLAN NOTE
Continue titrated oxygen to SPO2 90-92%  Extubated in PACU after thoracoscopy, high risk for deterioration  Monitor closely in ICU

## 2022-10-30 NOTE — ANESTHESIA POSTPROCEDURE EVALUATION
Patient: Migue Cervantes    Procedure Summary     Date: 10/30/22 Room / Location: [place] [id number] / SURGERY [facility]    Anesthesia Start: 1307 Anesthesia Stop: 1532    Procedures:       THORACOTOMY (Right: Chest)      DECORTICATION, LUNG (Right: Chest) Diagnosis: (loculated right pleural effusion )    Surgeons: Zhao Bundy M.D. Responsible Provider: Camilo Leon M.D.    Anesthesia Type: general ASA Status: 4 - Emergent          Final Anesthesia Type: general  Last vitals  BP   Blood Pressure : 118/64, Arterial BP: 114/59    Temp   35.8 °C (96.5 °F)    Pulse   85   Resp   20    SpO2   91 %      Anesthesia Post Evaluation    Patient location during evaluation: PACU  Patient participation: complete - patient participated  Level of consciousness: sleepy but conscious    Airway patency: patent  Anesthetic complications: no  Cardiovascular status: hemodynamically stable  Respiratory status: acceptable  Hydration status: balanced    PONV: none          No notable events documented.     Nurse Pain Score: 0 (NPRS)

## 2022-10-30 NOTE — CONSULTS
Critical Care Consultation    Date of consult: 10/30/2022    Referring Physician  Jeremy Munson M.D.    Reason for Consultation  Right empyema, status post decortication 10/30    History of Presenting Illness  67 y.o. male, PMH oxygen dependent COPD, 4.5L home oxygen ([facility]), CAD with prior PCI, HFrEF with EF 30%, prior ICD placement, T2DM, chronic pain syndrome on narcotics who presented 10/26/2022 in transfer from a [facility] with loculated right pleural effusion and pleuritic chest pain, marked leukocytosis.  He was recently hospitalized from [address] [place] [address] for right lower lobe pneumonia and COPD exacerbation treated with Zosyn per chart review and discharged home on 10/23.  He was rehospitalized the same day requiring ICU and BiPAP therapy.  Right thoracentesis at outside facility with 2.5 L bloody pleural fluid removed not sent for cytology.  Additionally, he was found to have DVT and was started on heparin infusion.  He was unable to lie flat for a CT initially.  Surgery placed a right chest tube on 10/27.  Repeat CT chest was able to be performed on 10/29 and showed no PE, diffuse pleural thickening of the right hemithorax with moderate sized loculated pleural fluid collection and possible right lower lobe lung abscess with dense consolidation of the right middle and lower lobes.  Today he was taken to the operating room and underwent right posterior lateral thoracotomy with pulmonary decortication.  There was concern that lung consolidation may be related to tumor rather than extrinsic compression from infectious empyema as there was very little pus.  Core biopsies were obtained from the lung as well for pathology.  He was extubated in the PACU and is not in distress, 5 L oxygen but high risk for deterioration given his multiorgan failure and oxygen dependent COPD.    Code Status  Full Code    Review of Systems  Review of Systems   Unable to perform ROS: Acuity of condition     Past  Medical History   has a past medical history of Coronary artery disease due to lipid rich plaque, Dyslipidemia, Essential hypertension, Former tobacco use - quit Sep 2017 in setting of STEMI, Heart burn, History of ST elevation myocardial infarction (STEMI) LAD September 2017, Indigestion, Pain (09-20-12), and Status post insertion of drug-eluting stent into left anterior descending artery.    Surgical History   has a past surgical history that includes cervical disk and fusion anterior (1997/2010); lumbar decompression (9/25/2012); fusion, spine, lumbar, plif (9/25/2012); lumbar decompression (9/25/2012); and beto by laparoscopy (N/A, 12/28/2015).    Family History  family history includes Heart Attack in his father, paternal grandfather, and paternal grandmother; Stroke in his father.    Social History   reports that he quit smoking about 5 years ago. His smoking use included cigarettes. He has a 70.00 pack-year smoking history. He has never used smokeless tobacco. He reports that he does not drink alcohol and does not use drugs.    Medications  Home Medications       Reviewed by Niyah Wiseman, PharmD (Pharmacist) on 10/26/22 at 1342  Med List Status: Complete     Medication Last Dose Status   albuterol 108 (90 Base) MCG/ACT Aero Soln inhalation aerosol PRN Active   amoxicillin-clavulanate (AUGMENTIN) 875-125 MG Tab 10/19/2022 Active   aspirin EC 81 MG EC tablet UNK Active   atorvastatin (LIPITOR) 40 MG Tab UNK Active   azithromycin (ZITHROMAX) 250 MG Tab 10/16/2022 Active   carvedilol (COREG) 25 MG Tab UNK Active   cyanocobalamin (VITAMIN B-12) 500 MCG Tab UNK Active   ferrous sulfate 325 (65 Fe) MG tablet UNK Active   fluticasone-salmeterol (ADVAIR DISKUS) 250-50 MCG/ACT AEROSOL POWDER, BREATH ACTIVATED UNK Active   furosemide (LASIX) 40 MG Tab UNK Active   lisinopril (PRINIVIL) 2.5 MG Tab UNK Active   metFORMIN (GLUCOPHAGE) 500 MG Tab UNK Active   omeprazole (PRILOSEC) 40 MG delayed-release capsule UNK Active    oxyCODONE-acetaminophen (PERCOCET-10)  MG Tab UNK Active   pregabalin (LYRICA) 25 MG Cap UNK Active   spironolactone (ALDACTONE) 25 MG Tab UNK Active   tiotropium (SPIRIVA RESPIMAT) 2.5 mcg/Act Aero Soln UNK Active   traZODone (DESYREL) 50 MG Tab UNK Active   vitamin D3 (CHOLECALCIFEROL) 1000 Unit (25 mcg) Tab UNK Active                  Current Facility-Administered Medications   Medication Dose Route Frequency Provider Last Rate Last Admin    lactated ringers infusion   Intravenous Continuous Camilo Leon M.D.        fentaNYL (SUBLIMAZE) injection 25 mcg  25 mcg Intravenous Q2 MIN PRN Camilo Leon M.D.        Or    fentaNYL (SUBLIMAZE) injection 50 mcg  50 mcg Intravenous Q2 MIN PRN Camilo Leon M.D.        oxyCODONE (ROXICODONE) oral solution 5 mg  5 mg Oral Once PRN Camilo Leon M.D.        Or    oxyCODONE (ROXICODONE) oral solution 10 mg  10 mg Oral Once PRN Camilo Leon M.D.        ondansetron (ZOFRAN) syringe/vial injection 4 mg  4 mg Intravenous Once PRN Camilo Leon M.D.        ePHEDrine injection 5 mg  5 mg Intravenous Q5 MIN PRN Camilo Leon M.D.        labetalol (NORMODYNE/TRANDATE) injection 5 mg  5 mg Intravenous Q5 MIN PRN Camilo Leon M.D.        hydrALAZINE (APRESOLINE) injection 5 mg  5 mg Intravenous Q5 MIN PRN Camilo Leon M.D.        albuterol (PROVENTIL) 2.5mg/0.5ml nebulizer solution 2.5 mg  2.5 mg Inhalation Q10 MIN PRN Camilo Leon M.D.        [MAR Hold] sodium chloride (SALT) tablet 2 g  2 g Oral TID WITH MEALS Jeremy Munson M.D.   2 g at 10/30/22 0730    [MAR Hold] ipratropium-albuterol (DUONEB) nebulizer solution  3 mL Nebulization 4X/DAY (RT) Jerry Vidal D.O.   3 mL at 10/30/22 0721    [MAR Hold] ampicillin/sulbactam (UNASYN) 3 g in  mL IVPB  3 g Intravenous Q6HRS Jeremy Munson M.D.   Stopped at 10/30/22 0548    [MAR Hold] HYDROmorphone (Dilaudid) injection 1 mg  1 mg Intravenous Q3HRS PRN  Jerry Vidal D.O.   1 mg at 10/30/22 0842    [MAR Hold] oxyCODONE-acetaminophen (PERCOCET-10)  MG per tablet 1 Tablet  1 Tablet Oral Q4HRS PRN Jerry Vidal D.O.   1 Tablet at 10/30/22 0519    heparin infusion 25,000 units in 500 mL 0.45% NACL  0-30 Units/kg/hr (Adjusted) Intravenous Continuous Jerry Vidal D.O.   Stopped. (Insulin or Heparin) at 10/30/22 0900    [MAR Hold] heparin injection 3,600 Units  40 Units/kg (Adjusted) Intravenous PRN Jerry Vidal D.O.        [MAR Hold] senna-docusate (PERICOLACE or SENOKOT S) 8.6-50 MG per tablet 2 Tablet  2 Tablet Oral BID Noe Amador M.D.   2 Tablet at 10/30/22 0517    And    [MAR Hold] polyethylene glycol/lytes (MIRALAX) PACKET 1 Packet  1 Packet Oral QDAY PRN Noe Amador M.D.   1 Packet at 10/29/22 0503    And    [MAR Hold] magnesium hydroxide (MILK OF MAGNESIA) suspension 30 mL  30 mL Oral QDAY PRN Noe Amador M.D.        And    [MAR Hold] bisacodyl (DULCOLAX) suppository 10 mg  10 mg Rectal QDAY PRREX Amador M.D.        [MAR Hold] acetaminophen (Tylenol) tablet 650 mg  650 mg Oral Q6HRS PRREX Amador M.D.        [MAR Hold] labetalol (NORMODYNE/TRANDATE) injection 10 mg  10 mg Intravenous Q4HRS PRREX Amador M.D.        [MAR Hold] ondansetron (ZOFRAN) syringe/vial injection 4 mg  4 mg Intravenous Q4HRS PRREX Amador M.D.        [MAR Hold] ondansetron (ZOFRAN ODT) dispertab 4 mg  4 mg Oral Q4HRS PRREX Amador M.D.        [MAR Hold] guaiFENesin dextromethorphan (ROBITUSSIN DM) 100-10 MG/5ML syrup 10 mL  10 mL Oral Q6HRS PRN Noe Amador M.D.        [MAR Hold] Respiratory Therapy Consult   Nebulization Continuous RT Noe Amador M.D.        [MAR Hold] ipratropium-albuterol (DUONEB) nebulizer solution  3 mL Nebulization Q2HRS PRN (RT) Noe Amador M.D.   3 mL at 10/27/22 0449    [MAR Hold] atorvastatin (LIPITOR) tablet 40 mg  40 mg Oral Q EVENING Noe Amador M.D.   40 mg at 10/29/22 1732    [MAR Hold] ferrous sulfate tablet  325 mg  325 mg Oral DAILY Noe Amador M.D.   325 mg at 10/30/22 0518    [MAR Hold] omeprazole (PRILOSEC) capsule 40 mg  40 mg Oral DAILY Noe Amador M.D.   40 mg at 10/30/22 0518    [MAR Hold] spironolactone (ALDACTONE) tablet 25 mg  25 mg Oral DAILY Noe Amador M.D.   25 mg at 10/30/22 0518    [MAR Hold] traZODone (DESYREL) tablet 100 mg  100 mg Oral Nightly Noe Amador M.D.   100 mg at 10/29/22 2103    predniSONE (DELTASONE) tablet 40 mg  40 mg Oral DAILY Noe Amador M.D.   40 mg at 10/30/22 0517    [MAR Hold] pregabalin (LYRICA) capsule 25 mg  25 mg Oral BID Noe Amador M.D.   25 mg at 10/30/22 0517    [MAR Hold] insulin regular (HumuLIN R,NovoLIN R) injection  1-6 Units Subcutaneous 4X/DAY ACHS Noe Amador M.D.   2 Units at 10/30/22 1142    And    [MAR Hold] dextrose 50% (D50W) injection 25 g  25 g Intravenous Q15 MIN PRN Noe Amador M.D.        [MAR Hold] Metoprolol Tartrate (LOPRESSOR) injection 5 mg  5 mg Intravenous Q5 MIN PRN Noe Amador M.D.        [MAR Hold] nystatin (MYCOSTATIN) 309948 UNIT/ML suspension 1,000,000 Units  10 mL Swish & Swallow 4X/DAY MYLA ObandoOVivek   1,000,000 Units at 10/30/22 0842    [MAR Hold] carvedilol (COREG) tablet 6.25 mg  6.25 mg Oral BID WITH MEALS Bart Salazar M.D.   6.25 mg at 10/28/22 0840    [MAR Hold] amiodarone (Cordarone) tablet 400 mg  400 mg Oral TWICE DAILY Bart Salazar M.D.   400 mg at 10/30/22 0517    [MAR Hold] LORazepam (ATIVAN) injection 0.5 mg  0.5 mg Intravenous Q4HRS PRN MYAL ObandoO.   0.5 mg at 10/29/22 1849     Facility-Administered Medications Ordered in Other Encounters   Medication Dose Route Frequency Provider Last Rate Last Admin    Lactated Ringers   Intravenous Intra-Op Continuous Camilo Leon M.D.   New Bag at 10/30/22 1445    fentaNYL (SUBLIMAZE) injection   Intravenous PRN Camilo Leon M.D.   50 mcg at 10/30/22 1312    midazolam (Versed) injection   Intravenous PRN Camilo Leon,  M.D.   2 mg at 10/30/22 1307    ketamine (KETALAR) 50 mg/ml injection   Intravenous PRREX Leon M.D.   50 mg at 10/30/22 1312    lidocaine PF (XYLOCAINE-MPF) 2 % injection PF   Intravenous PRREX Leon M.D.   60 mg at 10/30/22 1312    propofol (DIPRIVAN) injection   Intravenous PRREX Leon M.D.   100 mg at 10/30/22 1312    rocuronium (ZEMURON) injection   Intravenous PRREX Leon M.D.   100 mg at 10/30/22 1313    dexamethasone (DECADRON) injection   Intravenous PRREX Leon M.D.   8 mg at 10/30/22 1316    ceFAZolin (ANCEF) injection   Intravenous PRREX Leon M.D.   2 g at 10/30/22 1316    ePHEDrine injection   Intravenous PRREX Leon M.D.   10 mg at 10/30/22 1439    ondansetron (ZOFRAN) syringe/vial injection   Intravenous PRREX Leon M.D.   4 mg at 10/30/22 1428    sugammadex (BRIDION) injection   Intravenous PRREX Leon M.D.   200 mg at 10/30/22 1505       Allergies  Allergies   Allergen Reactions    Nkda [No Known Drug Allergy]        Vital Signs last 24 hours  Temp:  [35.8 °C (96.4 °F)-36.4 °C (97.6 °F)] 35.8 °C (96.5 °F)  Pulse:  [] 82  Resp:  [14-33] 20  BP: ()/(50-67) 118/64  SpO2:  [91 %-99 %] 95 %    Physical Exam  Physical Exam  Vitals and nursing note reviewed.   Constitutional:       General: He is not in acute distress.     Appearance: He is ill-appearing.      Comments: Somnolent but arouses follows commands, not in distress,   HENT:      Head: Normocephalic and atraumatic.      Nose: Nose normal.      Mouth/Throat:      Mouth: Mucous membranes are moist.   Eyes:      Pupils: Pupils are equal, round, and reactive to light.   Cardiovascular:      Rate and Rhythm: Normal rate and regular rhythm.      Pulses: Normal pulses.   Pulmonary:      Comments: Diminished breath sounds, chest tubes x2 on the right to Pleur-evac  Abdominal:      General: There is no distension.      Palpations:  Abdomen is soft.      Tenderness: There is no abdominal tenderness.   Musculoskeletal:         General: No swelling.      Cervical back: Neck supple.   Skin:     General: Skin is warm.      Capillary Refill: Capillary refill takes less than 2 seconds.   Neurological:      General: No focal deficit present.       Fluids    Intake/Output Summary (Last 24 hours) at 10/30/2022 1546  Last data filed at 10/30/2022 1530  Gross per 24 hour   Intake 1931.2 ml   Output 1520 ml   Net 411.2 ml       Laboratory  Recent Results (from the past 48 hour(s))   POCT glucose device results    Collection Time: 10/28/22  4:57 PM   Result Value Ref Range    POC Glucose, Blood 261 (H) 65 - 99 mg/dL   Heparin Xa (Unfractionated)    Collection Time: 10/28/22  5:00 PM   Result Value Ref Range    Heparin Xa (UFH) 0.70 IU/mL   POCT glucose device results    Collection Time: 10/28/22  9:17 PM   Result Value Ref Range    POC Glucose, Blood 285 (H) 65 - 99 mg/dL   POCT glucose device results    Collection Time: 10/29/22  7:24 AM   Result Value Ref Range    POC Glucose, Blood 214 (H) 65 - 99 mg/dL   CBC WITH DIFFERENTIAL    Collection Time: 10/29/22  9:30 AM   Result Value Ref Range    WBC 38.7 (H) 4.8 - 10.8 K/uL    RBC 4.66 (L) 4.70 - 6.10 M/uL    Hemoglobin 13.7 (L) 14.0 - 18.0 g/dL    Hematocrit 39.7 (L) 42.0 - 52.0 %    MCV 85.2 81.4 - 97.8 fL    MCH 29.4 27.0 - 33.0 pg    MCHC 34.5 33.7 - 35.3 g/dL    RDW 41.7 35.9 - 50.0 fL    Platelet Count 216 164 - 446 K/uL    MPV 9.7 9.0 - 12.9 fL    Neutrophils-Polys 91.20 (H) 44.00 - 72.00 %    Lymphocytes 1.40 (L) 22.00 - 41.00 %    Monocytes 5.40 0.00 - 13.40 %    Eosinophils 0.00 0.00 - 6.90 %    Basophils 0.30 0.00 - 1.80 %    Immature Granulocytes 1.70 (H) 0.00 - 0.90 %    Nucleated RBC 0.00 /100 WBC    Neutrophils (Absolute) 35.28 (H) 1.82 - 7.42 K/uL    Lymphs (Absolute) 0.56 (L) 1.00 - 4.80 K/uL    Monos (Absolute) 2.10 (H) 0.00 - 0.85 K/uL    Eos (Absolute) 0.00 0.00 - 0.51 K/uL    Baso  (Absolute) 0.11 0.00 - 0.12 K/uL    Immature Granulocytes (abs) 0.65 (H) 0.00 - 0.11 K/uL    NRBC (Absolute) 0.00 K/uL   Comp Metabolic Panel    Collection Time: 10/29/22  9:30 AM   Result Value Ref Range    Sodium 122 (L) 135 - 145 mmol/L    Potassium 4.8 3.6 - 5.5 mmol/L    Chloride 84 (L) 96 - 112 mmol/L    Co2 28 20 - 33 mmol/L    Anion Gap 10.0 7.0 - 16.0    Glucose 229 (H) 65 - 99 mg/dL    Bun 53 (H) 8 - 22 mg/dL    Creatinine 1.16 0.50 - 1.40 mg/dL    Calcium 8.6 8.5 - 10.5 mg/dL    AST(SGOT) 17 12 - 45 U/L    ALT(SGPT) 14 2 - 50 U/L    Alkaline Phosphatase 74 30 - 99 U/L    Total Bilirubin 0.5 0.1 - 1.5 mg/dL    Albumin 2.6 (L) 3.2 - 4.9 g/dL    Total Protein 5.3 (L) 6.0 - 8.2 g/dL    Globulin 2.7 1.9 - 3.5 g/dL    A-G Ratio 1.0 g/dL   MAGNESIUM    Collection Time: 10/29/22  9:30 AM   Result Value Ref Range    Magnesium 2.2 1.5 - 2.5 mg/dL   PHOSPHORUS    Collection Time: 10/29/22  9:30 AM   Result Value Ref Range    Phosphorus 2.7 2.5 - 4.5 mg/dL   proBrain Natriuretic Peptide, NT    Collection Time: 10/29/22  9:30 AM   Result Value Ref Range    NT-proBNP 607 (H) 0 - 125 pg/mL   DIGOXIN    Collection Time: 10/29/22  9:30 AM   Result Value Ref Range    Digoxin 0.8 0.8 - 2.0 ng/mL   Heparin Anti-Xa    Collection Time: 10/29/22  9:30 AM   Result Value Ref Range    Heparin Xa (UFH) 0.38 IU/mL   ESTIMATED GFR    Collection Time: 10/29/22  9:30 AM   Result Value Ref Range    GFR (CKD-EPI) 68 >60 mL/min/1.73 m 2   POCT glucose device results    Collection Time: 10/29/22 11:23 AM   Result Value Ref Range    POC Glucose, Blood 234 (H) 65 - 99 mg/dL   POCT glucose device results    Collection Time: 10/29/22  5:18 PM   Result Value Ref Range    POC Glucose, Blood 206 (H) 65 - 99 mg/dL   POCT glucose device results    Collection Time: 10/29/22  8:56 PM   Result Value Ref Range    POC Glucose, Blood 200 (H) 65 - 99 mg/dL   POCT glucose device results    Collection Time: 10/30/22  5:10 AM   Result Value Ref Range    POC  Glucose, Blood 200 (H) 65 - 99 mg/dL   CBC WITH DIFFERENTIAL    Collection Time: 10/30/22  8:20 AM   Result Value Ref Range    WBC 32.9 (H) 4.8 - 10.8 K/uL    RBC 3.88 (L) 4.70 - 6.10 M/uL    Hemoglobin 11.5 (L) 14.0 - 18.0 g/dL    Hematocrit 33.1 (L) 42.0 - 52.0 %    MCV 85.3 81.4 - 97.8 fL    MCH 29.6 27.0 - 33.0 pg    MCHC 34.7 33.7 - 35.3 g/dL    RDW 41.7 35.9 - 50.0 fL    Platelet Count 166 164 - 446 K/uL    MPV 9.8 9.0 - 12.9 fL    Neutrophils-Polys 90.90 (H) 44.00 - 72.00 %    Lymphocytes 1.30 (L) 22.00 - 41.00 %    Monocytes 6.00 0.00 - 13.40 %    Eosinophils 0.00 0.00 - 6.90 %    Basophils 0.20 0.00 - 1.80 %    Immature Granulocytes 1.60 (H) 0.00 - 0.90 %    Nucleated RBC 0.00 /100 WBC    Neutrophils (Absolute) 29.91 (H) 1.82 - 7.42 K/uL    Lymphs (Absolute) 0.42 (L) 1.00 - 4.80 K/uL    Monos (Absolute) 1.96 (H) 0.00 - 0.85 K/uL    Eos (Absolute) 0.00 0.00 - 0.51 K/uL    Baso (Absolute) 0.06 0.00 - 0.12 K/uL    Immature Granulocytes (abs) 0.52 (H) 0.00 - 0.11 K/uL    NRBC (Absolute) 0.00 K/uL   Comp Metabolic Panel    Collection Time: 10/30/22  8:20 AM   Result Value Ref Range    Sodium 122 (L) 135 - 145 mmol/L    Potassium 5.1 3.6 - 5.5 mmol/L    Chloride 89 (L) 96 - 112 mmol/L    Co2 25 20 - 33 mmol/L    Anion Gap 8.0 7.0 - 16.0    Glucose 239 (H) 65 - 99 mg/dL    Bun 43 (H) 8 - 22 mg/dL    Creatinine 0.94 0.50 - 1.40 mg/dL    Calcium 7.9 (L) 8.5 - 10.5 mg/dL    AST(SGOT) 16 12 - 45 U/L    ALT(SGPT) 10 2 - 50 U/L    Alkaline Phosphatase 64 30 - 99 U/L    Total Bilirubin 0.5 0.1 - 1.5 mg/dL    Albumin 2.1 (L) 3.2 - 4.9 g/dL    Total Protein 4.6 (L) 6.0 - 8.2 g/dL    Globulin 2.5 1.9 - 3.5 g/dL    A-G Ratio 0.8 g/dL   MAGNESIUM    Collection Time: 10/30/22  8:20 AM   Result Value Ref Range    Magnesium 2.2 1.5 - 2.5 mg/dL   PHOSPHORUS    Collection Time: 10/30/22  8:20 AM   Result Value Ref Range    Phosphorus 2.1 (L) 2.5 - 4.5 mg/dL   Heparin Anti-Xa    Collection Time: 10/30/22  8:20 AM   Result Value Ref  Range    Heparin Xa (UFH) <0.10 IU/mL   ESTIMATED GFR    Collection Time: 10/30/22  8:20 AM   Result Value Ref Range    GFR (CKD-EPI) 88 >60 mL/min/1.73 m 2   COD - Adult (Type and Screen)    Collection Time: 10/30/22  8:20 AM   Result Value Ref Range    ABO Grouping Only A (A)     Rh Grouping Only NEG     Antibody Screen-Cod NEG    POCT glucose device results    Collection Time: 10/30/22 11:41 AM   Result Value Ref Range    POC Glucose, Blood 226 (H) 65 - 99 mg/dL   ABO Rh Confirm    Collection Time: 10/30/22 12:39 PM   Result Value Ref Range    ABO Rh Confirm A NEG (A)        Imaging  DX-CHEST-FOR LINE PLACEMENT Perform procedure in: Other(comment f6 below): (PACU)   Final Result      1.  Interval RIGHT thoracotomy with removal of chest tube and placement of drains.   2.  RIGHT subclavian catheter tip at the mid RIGHT atrium.   3.  No pneumothorax.   4.  No other significant change from prior exam.         DX-CHEST-PORTABLE (1 VIEW)   Final Result         1.  Pulmonary edema and/or infiltrates are identified, which are stable since the prior exam.   2.  At least small right and trace left pleural effusions   3.  Cardiomegaly   4.  Atherosclerosis      CT-CTA CHEST PULMONARY ARTERY W/ RECONS   Final Result      1.  No CT evidence for pulmonary emboli.   2.  Volume loss of RIGHT hemithorax with diffuse pleural thickening, moderate size loculated pleural fluid collection, with possible RIGHT lower lobe lung abscess.   3.  Dense consolidation of the RIGHT middle and lower lobes with bronchial secretions.   4.  Mediastinal and RIGHT hilar adenopathy.               DX-CHEST-PORTABLE (1 VIEW)   Final Result         1.  Pulmonary edema and/or infiltrates are identified, which are stable since the prior exam.   2.  Trace bilateral pleural effusions   3.  Cardiomegaly   4.  Atherosclerosis      DX-CHEST-PORTABLE (1 VIEW)   Final Result         1.  Pulmonary edema and/or infiltrates are identified, which are stable since the  prior exam.   2.  Trace bilateral pleural effusions   3.  Cardiomegaly   4.  Atherosclerosis      DX-CHEST-LIMITED (1 VIEW)   Final Result      1.  Interval placement right-sided chest tube with tip barely within or just outside the pleural space. Associated right chest subcutaneous emphysema.   2.  Questionable tiny right apical pneumothorax.   3.  Small to moderate right-sided pleural effusion with associated basilar atelectasis and/or consolidation.   4.  Left basilar atelectasis. Possible layering left pleural effusion.   5.  Mild enlargement of the cardiomediastinal silhouette.      Findings conveyed to Dr. Vidal at 12:20 PM via Voalte messaging on 10/27/2022.      EC-ECHOCARDIOGRAM COMPLETE W/ CONT   Final Result      DX-CHEST-PORTABLE (1 VIEW)   Final Result      1.  Small to moderate right-sided pleural effusion with associated basilar atelectasis and/or consolidation.   2.  Left basilar atelectasis.   3.  Mild enlargement of the cardiomediastinal silhouette.            Assessment/Plan  * Loculated pleural effusion  Assessment & Plan  RLL pulmonary abscess with empyema versus underlying malignancy  Right thoracotomy and decortication with lung core biopsy sent 10/30  Continue antibiotic therapy with Unasyn  Follow-up chest imaging, WBC and culture/pathology  Surgery following  Marked leukocytosis, improving    Hyponatremia- (present on admission)  Assessment & Plan  Likely SIADH with pneumonia, empyema,?  Abscess versus tumor  Sodium 122, slow correction  Follow-up serum sodium level, LR postoperatively    Atrial fibrillation with RVR (HCC)- (present on admission)  Assessment & Plan  On amiodarone, rate controlled  Heparin held for thoracoscopy, resume when okay with surgery    Chronic pain syndrome- (present on admission)  Assessment & Plan  Pain controll postoperatively    Acute on chronic respiratory failure with hypoxia (HCC)- (present on admission)  Assessment & Plan  Continue titrated oxygen to SPO2  90-92%  Extubated in PACU after thoracoscopy, high risk for deterioration  Monitor closely in ICU    Acute DVT (deep venous thrombosis) ([facility])- (present on admission)  Assessment & Plan  Heparin infusion held for surgery 10/30  Resume when okay with surgery    Chronic HFrEF (heart failure with reduced ejection fraction) ([facility])- (present on admission)  Assessment & Plan  Reduced LV function on echocardiogram, poor windows  Maintain euvolemia perioperatively    CAD (coronary artery disease)- (present on admission)  Assessment & Plan  History of MI, PCI and prior ICD placement    COPD (chronic obstructive pulmonary disease) ([facility])- (present on admission)  Assessment & Plan  History of COPD and chronic hypoxia on supple oxygen 4.5 L  Continue bronchodilators, RT protocols      Discussed patient condition and risk of morbidity and/or mortality with RN, RT, and QA team.    The patient remains critically ill.  Critical care time = 45 minutes in directly providing and coordinating critical care and extensive data review.  No time overlap and excludes procedures.

## 2022-10-31 PROBLEM — E87.5 HYPERKALEMIA: Status: ACTIVE | Noted: 2022-01-01

## 2022-10-31 NOTE — PROGRESS NOTES
0420- PT rhythm changed to Afib, rate 100-140's. Nany MAURICIO notified, order to give AM PO amiodarone now. No further orders noted.    0600- PT heart rate sustaining in the 120-140's. Spoke with Dr. Fallon regarding patients status, will review patients record.

## 2022-10-31 NOTE — PROGRESS NOTES
Upon initial assessment patient with increased lethargy, AMS and work of breathing. RT Anibal at bedside, ABG done. PT placed on HFNC. PT encouraged to cough and deep breath, unable to clear secretions. Dr. Baugh notified. Orders for CXR placed.    Dr. Baugh at bedside to evaluate patient. At this time plan of care to continue on HHFNC and repeat ABG in 2 hours unless any increase lethargy noted.

## 2022-10-31 NOTE — DISCHARGE PLANNING
Case Management Discharge Planning    Admission Date: 10/26/2022  GMLOS: 9.6  ALOS: 5    6-Clicks ADL Score: 12  6-Clicks Mobility Score: 13      Anticipated Discharge Dispo: Discharge Disposition: Discharged to home/self care (01)    DME Needed: family would like a rollator, shower chair if goes home, no orders yet    Action(s) Taken: CM attended ICU rounds. Patient had a thoracotomy and has 2 chest tubes. Is Aox4 but very lethargic. Awaiting PT/OT reccs.     Escalations Completed: None    Medically Clear: No    Next Steps: will follow    Barriers to Discharge: Medical clearance    Is the patient up for discharge tomorrow: No

## 2022-10-31 NOTE — PROGRESS NOTES
"Critical Care Progress Note    Date of admission  10/26/2022    Chief Complaint  \"67 y.o. male, PMH oxygen dependent COPD, 4.5L home oxygen ([facility]), CAD with prior PCI, HFrEF with EF 30%, prior ICD placement, T2DM, chronic pain syndrome on narcotics who presented 10/26/2022 in transfer from a [facility] with loculated right pleural effusion and pleuritic chest pain, marked leukocytosis.  He was recently hospitalized from 10/17-10/23 for right lower lobe pneumonia and COPD exacerbation treated with Zosyn per chart review and discharged home on 10/23.  He was rehospitalized the same day requiring ICU and BiPAP therapy.  Right thoracentesis at outside facility with 2.5 L bloody pleural fluid removed not sent for cytology.  Additionally, he was found to have DVT and was started on heparin infusion.  He was unable to lie flat for a CT initially.  Surgery placed a right chest tube on 10/27.  Repeat CT chest was able to be performed on 10/29 and showed no PE, diffuse pleural thickening of the right hemithorax with moderate sized loculated pleural fluid collection and possible right lower lobe lung abscess with dense consolidation of the right middle and lower lobes.  Today he was taken to the operating room and underwent right posterior lateral thoracotomy with pulmonary decortication.  There was concern that lung consolidation may be related to tumor rather than extrinsic compression from infectious empyema as there was very little pus.  Core biopsies were obtained from the lung as well for pathology.  He was extubated in the PACU and is not in distress, 5 L oxygen but high risk for deterioration given his multiorgan failure and oxygen dependent COPD.\" Dr Starks    Hospital Course  10/26 - Admitted to hospital floor    10/30 - Right sided thoracotomy with partial right pulm decortication of RUL/RML.  Admitted to ICU post operatively    10/31 - Afib RVR, load digoxin, restart heparin no bolus/no " rebolus      Interval Problem Update  Reviewed last 24 hour events:  Tmax: Afebrile  Diet: Regular  Vasopressors: Norepinephrine    Antibx:   Unasyn 10/28 - present    Cefepime 10/26 - 10/28  Linezolid 10/26 - 10/27    WBC 45 --> 33    Na 125 --> 119 --> 123 (126 on admission)  K 5.7      Intake / Output    Intake/Output Summary (Last 24 hours) at 10/31/2022 1222  Last data filed at 10/31/2022 1000  Gross per 24 hour   Intake 2106.83 ml   Output 1810 ml   Net 296.83 ml     Net IO Since Admission: -4,149.61 mL [10/31/22 0640]     Review of Systems   Review of Systems   Constitutional:  Positive for malaise/fatigue. Negative for chills and fever.   HENT:  Negative for congestion and sore throat.    Eyes: Negative.    Respiratory:  Negative for sputum production and shortness of breath.    Cardiovascular:  Positive for chest pain (around drain site, with deep inspiration). Negative for palpitations.   Gastrointestinal:  Negative for abdominal pain, nausea and vomiting.   Genitourinary: Negative.    Musculoskeletal: Negative.    Skin: Negative.    Neurological:  Negative for focal weakness and headaches.   All other systems reviewed and are negative.     Vital Signs for last 24 hours   Temp:  [35.8 °C (96.5 °F)-37.7 °C (99.9 °F)] 36.4 °C (97.5 °F)  Pulse:  [] 122  Resp:  [9-60] 21  BP: ()/(49-82) 91/51  SpO2:  [88 %-99 %] 89 %    Hemodynamic parameters for last 24 hours       Respiratory Information for the last 24 hours       Physical Exam   Physical Exam  Vitals and nursing note reviewed. Exam conducted with a chaperone present.   Constitutional:       General: He is not in acute distress.     Appearance: Normal appearance. He is ill-appearing.   HENT:      Head: Normocephalic.      Nose: Nose normal.      Comments: On HFNC     Mouth/Throat:      Mouth: Mucous membranes are moist.   Eyes:      Extraocular Movements: Extraocular movements intact.   Cardiovascular:      Rate and Rhythm: Tachycardia present.  Rhythm irregular.      Pulses: Normal pulses.   Pulmonary:      Effort: Pulmonary effort is normal. No respiratory distress.      Comments: Low lung volumes  Abdominal:      General: There is no distension.      Palpations: Abdomen is soft.      Tenderness: There is no abdominal tenderness. There is no guarding or rebound.   Musculoskeletal:         General: Normal range of motion.      Cervical back: Normal range of motion and neck supple.      Right lower leg: No edema.      Left lower leg: No edema.   Skin:     General: Skin is warm and dry.      Capillary Refill: Capillary refill takes less than 2 seconds.   Neurological:      General: No focal deficit present.      Mental Status: He is alert and oriented to person, place, and time. Mental status is at baseline.       Medications  Current Facility-Administered Medications   Medication Dose Route Frequency Provider Last Rate Last Admin    digoxin (Lanoxin) injection 125 mcg  125 mcg Intravenous Once Luisito Fallon M.D.        Followed by    digoxin (Lanoxin) injection 125 mcg  125 mcg Intravenous Once Luisito Fallon M.D.        Followed by    [START ON 11/1/2022] digoxin (Lanoxin) injection 125 mcg  125 mcg Intravenous DAILY AT 1800 Luisito Fallon M.D.        heparin infusion 25,000 units in 500 mL 0.45% NACL  0-30 Units/kg/hr (Adjusted) Intravenous Continuous Luisito Fallon M.D. MD Alert...ICU Electrolyte Replacement per Pharmacy   Other PHARMACY TO DOSE Mazin Starks M.D.        norepinephrine (Levophed) 8 mg in 250 mL NS infusion (premix)  0-30 mcg/min Intravenous Continuous RONN Paz.P.R.N. 15 mL/hr at 10/31/22 0925 8 mcg/min at 10/31/22 0925    ipratropium-albuterol (DUONEB) nebulizer solution  3 mL Nebulization Q4HRS (RT) Jeremy Munson M.D.   3 mL at 10/31/22 0217    sodium chloride (SALT) tablet 2 g  2 g Oral TID WITH MEALS Jeremy Munson M.D.   2 g at 10/31/22 1157    ampicillin/sulbactam (UNASYN) 3 g in  mL IVPB  3 g  Intravenous Q6HRS Jeremy Munson M.D. 200 mL/hr at 10/31/22 1157 3 g at 10/31/22 1157    HYDROmorphone (Dilaudid) injection 1 mg  1 mg Intravenous Q3HRS PRN Jeryr Vidal D.O.   1 mg at 10/30/22 0842    oxyCODONE-acetaminophen (PERCOCET-10)  MG per tablet 1 Tablet  1 Tablet Oral Q4HRS PRN Jerry Vidal D.O.   1 Tablet at 10/31/22 1156    senna-docusate (PERICOLACE or SENOKOT S) 8.6-50 MG per tablet 2 Tablet  2 Tablet Oral BID Noe Amador M.D.   2 Tablet at 10/30/22 0517    And    polyethylene glycol/lytes (MIRALAX) PACKET 1 Packet  1 Packet Oral QDAY PRN Noe Amador M.D.   1 Packet at 10/29/22 0503    And    magnesium hydroxide (MILK OF MAGNESIA) suspension 30 mL  30 mL Oral QDAY PRN Noe Amador M.D.        And    bisacodyl (DULCOLAX) suppository 10 mg  10 mg Rectal QDAY PRN Noe Amador M.D.        acetaminophen (Tylenol) tablet 650 mg  650 mg Oral Q6HRS PRN Noe Amador M.D.   650 mg at 10/31/22 0248    ondansetron (ZOFRAN) syringe/vial injection 4 mg  4 mg Intravenous Q4HRS PRN Noe Amador M.D.        ondansetron (ZOFRAN ODT) dispertab 4 mg  4 mg Oral Q4HRS PRN Noe Amador M.D.        Respiratory Therapy Consult   Nebulization Continuous RT Noe Amador M.D.        ipratropium-albuterol (DUONEB) nebulizer solution  3 mL Nebulization Q2HRS PRN (RT) Noe Amador M.D.   3 mL at 10/30/22 2039    atorvastatin (LIPITOR) tablet 40 mg  40 mg Oral Q EVENING Noe Amador M.D.   40 mg at 10/29/22 1732    ferrous sulfate tablet 325 mg  325 mg Oral DAILY Noe Amador M.D.   325 mg at 10/31/22 0542    omeprazole (PRILOSEC) capsule 40 mg  40 mg Oral DAILY Noe Amador M.D.   40 mg at 10/31/22 0542    [Held by provider] spironolactone (ALDACTONE) tablet 25 mg  25 mg Oral DAILY Noe Amador M.D.   25 mg at 10/31/22 0542    traZODone (DESYREL) tablet 100 mg  100 mg Oral Nightly Noe Amador M.D.   100 mg at 10/29/22 2103    pregabalin (LYRICA) capsule 25 mg  25 mg Oral BID Noe Amador M.D.   25  mg at 10/31/22 0542    insulin regular (HumuLIN R,NovoLIN R) injection  1-6 Units Subcutaneous 4X/DAY ACHS Noe Amador M.D.   1 Units at 10/31/22 1158    And    dextrose 50% (D50W) injection 25 g  25 g Intravenous Q15 MIN PRN Noe Amador M.D.        nystatin (MYCOSTATIN) [id number] UNIT/ML suspension 1,000,000 Units  10 mL Swish & Swallow 4X/DAY Jerry Vidal D.O.   1,000,000 Units at 10/31/22 0834    [Held by provider] carvedilol (COREG) tablet 6.25 mg  6.25 mg Oral BID WITH MEALS Bart Salazar M.D.   6.25 mg at 10/28/22 0840    amiodarone (Cordarone) tablet 400 mg  400 mg Oral TWICE DAILY Bart Salazar M.D.   400 mg at 10/31/22 0411       Fluids    Intake/Output Summary (Last 24 hours) at 10/31/2022 1222  Last data filed at 10/31/2022 1000  Gross per 24 hour   Intake 2106.83 ml   Output 1810 ml   Net 296.83 ml       Laboratory  Recent Labs     10/30/22  1927 10/30/22  2150 10/31/22  0232   ISTATAPH 7.249* 7.344* 7.373*   ISTATAPCO2 72.1* 56.3* 53.9*   ISTATAPO2 135* 56* 63*   ISTATATCO2 34* 32 33   YPMITYR8NIB 98 86* 90*   ISTATARTHCO3 31.5* 30.7* 31.4*   ISTATARTBE 2 4* 5*   ISTATTEMP 96.6 F 99.0 F 97.5 F   ISTATFIO2 100 35 40   ISTATSPEC Arterial Arterial Arterial   ISTATAPHTC 7.264* 7.341* 7.382*   [id number] 128* 57* 60*         Recent Labs     10/29/22  0930 10/30/22  0820 10/30/22  1740 10/31/22  0419   SODIUM 122* 122* 123* 123*   POTASSIUM 4.8 5.1 5.9* 5.7*   CHLORIDE 84* 89* 89* 88*   CO2 28 25 26 26   BUN 53* 43* 42* 41*   CREATININE 1.16 0.94 0.92 0.94   MAGNESIUM 2.2 2.2  --  2.2   PHOSPHORUS 2.7 2.1*  --   --    CALCIUM 8.6 7.9* 8.5 8.5     Recent Labs     10/29/22  0930 10/30/22  0820 10/30/22  1740 10/31/22  0419   ALTSGPT 14 10  --  11   ASTSGOT 17 16  --  22   ALKPHOSPHAT 74 64  --  63   TBILIRUBIN 0.5 0.5  --  0.4   GLUCOSE 229* 239* 236* 210*     Recent Labs     10/29/22  0930 10/30/22  0820 10/30/22  1740 10/31/22  0419   WBC 38.7* 32.9* 45.5* 32.6*   NEUTSPOLYS 91.20* 90.90*   --  90.90*   LYMPHOCYTES 1.40* 1.30*  --  1.50*   MONOCYTES 5.40 6.00  --  5.80   EOSINOPHILS 0.00 0.00  --  0.00   BASOPHILS 0.30 0.20  --  0.20   ASTSGOT 17 16  --  22   ALTSGPT 14 10  --  11   ALKPHOSPHAT 74 64  --  63   TBILIRUBIN 0.5 0.5  --  0.4     Recent Labs     10/30/22  0820 10/30/22  1740 10/31/22  0419   RBC 3.88* 4.04* 3.87*   HEMOGLOBIN 11.5* 11.9* 11.3*   HEMATOCRIT 33.1* 35.7* 33.8*   PLATELETCT 166 246 208       Imaging  X-Ray:  I have personally reviewed the images and compared with prior images. and My impression is: RLL opacifications and effusion, chest tubes in place    Assessment/Plan  * Loculated pleural effusion  Assessment & Plan  RLL pulmonary abscess with empyema versus underlying malignancy  - gram stain negative thus far  - cultures negative thus far  - awaiting pathology and following up on cultures as days go on    Right thoracotomy and decortication with lung core biopsy sent 10/30  Continue antibiotic therapy with Unasyn  Follow-up chest imaging, WBC and culture/pathology  Surgery following    Hyperkalemia  Assessment & Plan  Small fluid bolus today, has been net negative  Will monitor urine output (thus far has been ok)  Trend closely  ECG today    Hyponatremia- (present on admission)  Assessment & Plan  Likely SIADH with pneumonia, empyema,?  Abscess versus tumor  Sodium 122, slow correction  Follow-up serum sodium level    Went from 126 on admission to 119, now up to 123  If not improvement in next few days will try 3%    Atrial fibrillation with RVR ([facility])- (present on admission)  Assessment & Plan  On amiodarone  Heparin gtt (also for DVT)  - no bolus / no re-bolus given recent surgery    RVR overnight and today  Given underlying heart failure will load with digoxin    Dig level on Wednesday 11/2     Lactic acidosis  Assessment & Plan  Recheck today given ongoing vasopressor requirements    Acute DVT (deep venous thrombosis) ([facility])- (present on admission)  Assessment &  Plan  Heparin infusion held for surgery 10/30  Ok to resume 10/31 with no bolus and no re-bolus  Xa monitoring    Chronic HFrEF (heart failure with reduced ejection fraction) ([facility])- (present on admission)  Assessment & Plan  Reduced LV function on echocardiogram, poor windows  Maintain euvolemia perioperatively    Chronic pain syndrome- (present on admission)  Assessment & Plan  Pain controll postoperatively    Acute on chronic respiratory failure with hypoxia ([facility])- (present on admission)  Assessment & Plan  Continue titrated oxygen to SPO2 90-92%  Extubated in PACU after thoracoscopy, high risk for deterioration  Monitor closely in ICU    ICD (implantable cardioverter-defibrillator), [employer]- (present on admission)  Assessment & Plan  Hx of    CAD (coronary artery disease)- (present on admission)  Assessment & Plan  History of MI, PCI and prior ICD placement    GERD (gastroesophageal reflux disease)- (present on admission)  Assessment & Plan  PPI    COPD (chronic obstructive pulmonary disease) ([facility])- (present on admission)  Assessment & Plan  History of COPD and chronic hypoxia on supple oxygen 4.5 L  Continue bronchodilators, RT protocols       VTE:  Heparin gtt  Ulcer: PPI - home med  Lines: Central Line  Ongoing indication addressed    I have performed a physical exam and reviewed and updated ROS and Plan today (10/31/2022). In review of yesterday's note (10/30/2022), there are no changes except as documented above.     Discussed patient condition and risk of morbidity and/or mortality with RN, RT, Pharmacy, , Charge nurse / hot rounds, Patient, and general surgery    The patient remains critically ill.  Critical care time = 69 minutes in directly providing and coordinating critical care and extensive data review.  No time overlap and excludes procedures.

## 2022-10-31 NOTE — CARE PLAN
Respiratory Therapy Update    Interdisciplinary Plan of Care-Goals (Indications)  Bronchodilator Indications: History / Diagnosis;Physical Exam / Hyperinflation / Wheezing (bronchospasm) (10/30/22 2206)       PEP/CPT Performed: PEP / Flutter (10/30/22 0720)     SVN Performed: Yes (10/30/22 0720)    Cough: Weak;Congested;Non Productive (10/31/22 1600)  Sputum Amount: Unable to Evaluate (10/31/22 1600)  Sputum Color: Unable to Evaluate (10/31/22 1600)  Sputum Consistency: Unable to Evaluate (10/31/22 1600)    FiO2%: 40 % (10/31/22 1600)  O2 (LPM): 60 (10/31/22 1600)    Breath Sounds  RUL Breath Sounds: Diminished (10/31/22 1600)  RML Breath Sounds: Diminished (10/31/22 1600)  RLL Breath Sounds: Diminished (10/31/22 1600)  VENTURA Breath Sounds: Diminished (10/31/22 1600)  LLL Breath Sounds: Diminished (10/31/22 1600)

## 2022-10-31 NOTE — PROGRESS NOTES
"  DATE: 10/31/2022    POD1 Right Thoracotomy, decortication with chest tube placement x2    INTERVAL EVENTS:  Very tenuous pulmonary status, on HFNC  In afib with RVR this morning  CXR looks as expected  Consider Palliative Care consult for goals of care given overall frailty, advanced age, debility, and high likelihood for prolonged hospital course with complications.    PHYSICAL EXAMINATION:  Vital Signs: BP (!) 80/53   Pulse (!) 127   Temp 36.4 °C (97.5 °F) (Oral)   Resp (!) 33   Ht 1.803 m (5' 10.98\")   Wt 106 kg (232 lb 12.9 oz)   SpO2 93%     Diminished lung sounds on right with modest effort  CT#1 scant output  CT#2 serosang output    LABORATORY VALUES:   Recent Labs     10/30/22  0820 10/30/22  1740 10/31/22  0419   WBC 32.9* 45.5* 32.6*   RBC 3.88* 4.04* 3.87*   HEMOGLOBIN 11.5* 11.9* 11.3*   HEMATOCRIT 33.1* 35.7* 33.8*   MCV 85.3 88.4 87.3   MCH 29.6 29.5 29.2   MCHC 34.7 33.3* 33.4*   RDW 41.7 43.4 42.9   PLATELETCT 166 246 208   MPV 9.8 9.5 9.4       ASSESSMENT AND PLAN:  * Loculated pleural effusion  Assessment & Plan  Small to moderate right-sided pleural effusion on CT from referring facility  10/27 Chest tube placed at bedside with evacuation of 1.3L of serous fluid, Pleural fluid cultures negative  10/29 CT: Moderate size loculated pleural fluid collection, with possible RIGHT lower lobe lung abscess.  Persistent leukocytosis.  10/30 Right posterolateral thoracotomy, partial decortication. Minimal decortication of right upper lobe and right middle lobe surface.  Chest tube x 2, wall suction. Pleural cultures and biopsies pending.  Daily chest x-ray  [facility]    Appreciate ICU and consulting physician care.     ____________________________________     Jacob Prado M.D.    "

## 2022-10-31 NOTE — HOSPITAL COURSE
10/26 - Admitted to hospital floor  10/30 - Right sided thoracotomy with partial right pulm decortication of RUL/RML.  Admitted to ICU post operatively  10/31 - Afib RVR, load digoxin, restart heparin no bolus/no rebolus  11/1 - Worse today, O2 requirements and vasopressor requirements increasing.  11/2 - Discussed results of biopsy with family and patient.  Considering goals.  Palliative consult

## 2022-10-31 NOTE — PROGRESS NOTES
Pt arrived from PACU    HR 93  /56  RR 20  SP02 92% on 5L mask  Temp 98.9F temporal  4 Eyes Skin Assessment Completed by Liza, RN and Keyon RN.    Head WDL  Ears WDL  Nose WDL  Mouth WDL  Neck WDL  Breast/Chest WDL, previous chest tube site on R and 2x  new chest tube sites  Shoulder Blades WDL  Spine WDL  (R) Arm/Elbow/Hand Redness and Blanching  (L) Arm/Elbow/Hand Redness and Blanching  Abdomen WDL  Groin Redness and Blanching  Scrotum/Coccyx/Buttocks Redness and Blanching  (R) Leg WDL  (L) Leg WDL  (R) Heel/Foot/Toe WDL  (L) Heel/Foot/Toe WDL          Devices In Places ECG, Tele Box, Blood Pressure Cuff, Pulse Ox, Tay, Arterial Line, and SCD's      Interventions In Place Heel Mepilex, TAP System, Pillows, Q2 Turns, ZFlo Pillow, and Pressure Redistribution Mattress    Possible Skin Injury Yes    Pictures Uploaded Into Epic Yes  Wound Consult Placed Yes  RN Wound Prevention Protocol Ordered Yes

## 2022-10-31 NOTE — RESPIRATORY CARE
MD presented to bedside. Verbal orders given to maintain saturations of 88-92%. Will repeat ABG in two hours and re-assess for BiPAP use.     Patient is currently receiving 60 L 35% FIO2 via high flow nasal cannula.     Gold colored ring removed from patient's left ring finger, placed in a sterile cup and given to patient's wife. Wife carried ring out of unit in her pocket.

## 2022-10-31 NOTE — CARE PLAN
Problem: Hemodynamics  Goal: Patient's hemodynamics, fluid balance and neurologic status will be stable or improve  Outcome: Not Progressing     Problem: Fluid Volume  Goal: Fluid volume balance will be maintained  Outcome: Progressing     Problem: Respiratory  Goal: Patient will achieve/maintain optimum respiratory ventilation and gas exchange  Outcome: Not Progressing     Problem: Pain - Standard  Goal: Alleviation of pain or a reduction in pain to the patient’s comfort goal  Outcome: Progressing   The patient is Watcher - Medium risk of patient condition declining or worsening    Shift Goals    Clinical Goals: Hemodynamic stability, Decrease O2 and pain control  Patient Goals: pain control and rest  Family Goals: no family present    Progress made toward(s) clinical / shift goals: Pt on levophed drip to keep MAP> 65. Pt on High flow nasal cannula. Pt encouraged to cough, deep breathe and use IS. Pt given pain medication per scale. Pt resting comfortably after medication.     Patient is not progressing towards the following goals:      Problem: Hemodynamics  Goal: Patient's hemodynamics, fluid balance and neurologic status will be stable or improve  Outcome: Not Progressing     Problem: Respiratory  Goal: Patient will achieve/maintain optimum respiratory ventilation and gas exchange  Outcome: Not Progressing

## 2022-10-31 NOTE — CARE PLAN
Problem: Bronchopulmonary Hygiene  Goal: Increase mobilization of retained secretions  Description: Target End Date:  2 to 3 days    1.  Perform bronchopulmonary therapy as indicated by assessment  2.  Perform airway suctioning  3.  Perform actions to maintain patient airway  10/31/2022 0246 by Anibal Lozoya, RRT  Outcome: Progressing  10/31/2022 0245 by Anibal Lozoya, RRT  Outcome: Progressing     Problem: Bronchoconstriction  Goal: Improve in air movement and diminished wheezing  Description: Target End Date:  2 to 3 days    1.  Implement inhaled treatments  2.  Evaluate and manage medication effects  Outcome: Progressing     Problem: Humidified High Flow Nasal Cannula  Goal: Maintain adequate oxygenation dependent on patient condition  Description: Target End Date:  resolve prior to discharge or when underlying condition is resolved/stabilized    1.  Implement humidified high flow oxygen therapy  2.  Titrate high flow oxygen to maintain appropriate SpO2  Outcome: Progressing   Patient remained on high flow nasal cannula throughout the night with improved ABGs. He is currently receiving 60L 40% FIO2. Receiving Duoneb Q4 inline and performing IPV QID to aid with secretion clearance.

## 2022-10-31 NOTE — THERAPY
"Speech Language Pathology  Daily Treatment     Patient Name: Migue Cervantes  Age:  67 y.o., Sex:  male  Medical Record #: [id number]  Today's Date: 10/31/2022     Precautions  Precautions: (P) Fall Risk, Swallow Precautions ( See Comments)    HPI: 68 y/o M admitted 10/26/22 with transfer from [facility] in [facility] with acute on chronic respiratory failure.  Patient has significant history of COPD and is baseline on 4 L of oxygen via nasal cannula.  In addition he has a history of coronary artery disease, history of heart failure.    Assessment    Patient seen this date for dysphagia management. Patient is now on 60L, 40% FiO2 HFNC. Patient's family at bedside. Patient reports eating mostly pureed/transitional consistencies (jello, sherbert, soup) r/t preference at this time. Updated CXR 10/31: \"Pulmonary edema and/or infiltrates are identified, which are somewhat decreased since the prior exam.\"    PO presentations of TN0 and transitional consistency (sherbert) per patient request. Adequate oral bolus acceptance/containment, complete AP transfer, and timely lingual manipulation. No cough/throat clear appreciated with PO. Vocal quality remained stable throughout assessment. Single swallow completed per bolus. Provided extensive education regarding potential for silent aspiration, increased risk for aspiration r/t current respiratory needs, and concerning chest imaging indicating need for instrumental assessment of swallow function (FEES). Patient not agreeable to swallowing diagnostic at this time and requested 1-2 days to regain strength. Discussed potential contribution of aspiration to current medical condition. Patient and family at bedside adamant about waiting a couple days prior to decision at this time. Patient stated desire to continue on current diet despite explained potential sequelae of aspiration. Patient not interested in downgrading solids at this time.    Recommendations  1.  Continue " "regular/thin diet despite risk  2.  Instrumentation: FEES r/t increased respiratory needs (HFNC), pending patient decision  3.  Swallowing Instructions & Precautions:   Supervision: Distant supervision - check on patient 2-3 times per meal  Positioning: Fully upright and midline during oral intake, Meals sitting upright in a chair, as tolerated; remain upright 30 mins after eating d/t hx GERD  Medication: Whole with liquid  Strategies: Small bites/sips, Slow rate of intake  Oral Care: after meals, before bed  4.  Following for dysphagia management      Plan    Continue current treatment plan.    Discharge Recommendations: (P) Recommend post-acute placement for additional speech therapy services prior to discharge home       Objective       10/31/22 1351   Charge Group   SLP Swallowing Dysfunction Treatment Swallowing Dysfunction Treatment   Total Treatment Time   SLP Time Spent Yes   SLP Swallowing Dysfunction Treatment (Mins) 16   Precautions   Precautions Fall Risk;Swallow Precautions ( See Comments)   Vitals   O2 (LPM) 60   O2 Delivery Device Heated High Flow Nasal Cannula   Pain 0 - 10 Group   Therapist Pain Assessment Post Activity Pain Same as Prior to Activity;0   Dysphagia    Dysphagia X   Diet / Liquid Recommendation Thin (0);Regular (7)   Nutritional Liquid Intake Rating Scale Non thickened beverages   Nutritional Food Intake Rating Scale Total oral diet with no restrictions   Skilled Intervention Verbal Cueing   Patient / Family Goals   Patient / Family Goal #1 \"I've been having water and ice chips.\"   Goal #1 Outcome Progressing as expected   Short Term Goals   Short Term Goal # 1 Patient will consume meals of regular solids and thin liquids with no s/sx of aspiration.   Goal Outcome # 1 Progressing slower than expected   Education Group   Education Provided Dysphagia   Dysphagia Patient Response Patient;Significant Other;Acceptance;Explanation;Reinforcement Needed   Anticipated Discharge Needs "   Discharge Recommendations Recommend post-acute placement for additional speech therapy services prior to discharge home   Therapy Recommendations Upon DC Dysphagia Training   Interdisciplinary Plan of Care Collaboration   IDT Collaboration with  Nursing;Family / Caregiver   Patient Position at End of Therapy In Bed;Bed Alarm On;Call Light within Reach;Tray Table within Reach;Family / Friend in Room   Collaboration Comments RN updated

## 2022-10-31 NOTE — CARE PLAN
The patient is Watcher - Medium risk of patient condition declining or worsening    Shift Goals  Clinical Goals: Pain control, improved mentation, improved WOB  Patient Goals: pain, sleep  Family Goals: GOPI    Progress made toward(s) clinical / shift goals:    Problem: Knowledge Deficit - Standard  Goal: Patient and family/care givers will demonstrate understanding of plan of care, disease process/condition, diagnostic tests and medications  Outcome: Progressing     Problem: Knowledge Deficit - COPD  Goal: Patient/significant other demonstrates understanding of disease process, utilization of the Action Plan, medications and discharge instruction  Outcome: Progressing     Problem: Risk for Infection - COPD  Goal: Patient will remain free from signs and symptoms of infection  Outcome: Progressing     Problem: Nutrition - Advanced  Goal: Patient will display progressive weight gain toward goal have adequate food and fluid intake  Outcome: Progressing     Problem: Ineffective Airway Clearance  Goal: Patient will maintain patent airway with clear/clearing breath sounds  Outcome: Progressing     Problem: Impaired Gas Exchange  Goal: Patient will demonstrate improved ventilation and adequate oxygenation and participate in treatment regimen within the level of ability/situation.  Outcome: Progressing     Problem: Risk for Aspiration  Goal: Patient's risk for aspiration will be absent or decrease  Outcome: Progressing     Problem: Self Care  Goal: Patient will have the ability to perform ADLs independently or with assistance (bathe, groom, dress, toilet and feed)  Outcome: Progressing     Problem: Hemodynamics  Goal: Patient's hemodynamics, fluid balance and neurologic status will be stable or improve  Outcome: Progressing     Problem: Fluid Volume  Goal: Fluid volume balance will be maintained  Outcome: Progressing     Problem: Urinary - Renal Perfusion  Goal: Ability to achieve and maintain adequate renal perfusion and  functioning will improve  Outcome: Progressing     Problem: Respiratory  Goal: Patient will achieve/maintain optimum respiratory ventilation and gas exchange  Outcome: Progressing     Problem: Mechanical Ventilation  Goal: Safe management of artificial airway and ventilation  Outcome: Progressing  Goal: Successful weaning off mechanical ventilator, spontaneously maintains adequate gas exchange  Outcome: Progressing  Goal: Patient will be able to express needs and understand communication  Outcome: Progressing     Problem: Physical Regulation  Goal: Diagnostic test results will improve  Outcome: Progressing  Goal: Signs and symptoms of infection will decrease  Outcome: Progressing     Problem: Fall Risk  Goal: Patient will remain free from falls  Outcome: Progressing     Problem: Pain - Standard  Goal: Alleviation of pain or a reduction in pain to the patient’s comfort goal  Outcome: Progressing     Problem: Skin Integrity  Goal: Skin integrity is maintained or improved  Outcome: Progressing       Patient is not progressing towards the following goals:

## 2022-11-01 PROBLEM — R57.9 SHOCK (HCC): Status: ACTIVE | Noted: 2022-01-01

## 2022-11-01 NOTE — ASSESSMENT & PLAN NOTE
Persistent shock post surgery, unclear etiology  LV function appears depressed from prior though recent ECHO was inconclusive  Cultures all negative thus far but has been on antibiotics here and at OSH prior, so cannot rule out sepsis  Did not improve with fluid bolus yesterday, despite being net negative unlikely hypovolemia  Could be combination of the above    Will start stress dose steroids 11/1 and monitor for effect  HR controlled with digoxin, which will also help with depressed LV function    Concern with cold extremities today and worsening CXR  Renal function slightly worse with diuresis  - regardless, possible cardiogenic shock in nature  - continue digoxin  - for now mentating well  - warm in extremities    Lung consolidation not due to pulmonary edema, more likely to to collapse from endobronchial lesion vs extrinsic compression from lung ca

## 2022-11-01 NOTE — DIETARY
Nutrition Services Brief Update:    Day 6 of admit.  Migue Cervantes is a 67 y.o. male with admitting DX of Pleural effusion. Atrial fibrillation with rapid ventricular response. Pt S/P right thoracotomy, decortication on 10/30. Now with 2 chest tubes.    Pt followed by RD for adequacy of PO intake. Pt is receiving a Regular diet with Boost Glucose Control one time per day at breakfast. Recorded PO intake has varied from 0% to 50-75%. Noted pt is ordering less at meals. PO intake at breakfast today was 0%, but pt did take % of Boost. Will send Boost Glucose Control with all meals. Encourage PO intake.    Problem: Nutritional:  Goal: Achieve adequate nutritional intake  Description: Patient will consume >50% of meals and supplements.  Outcome: Goal not met.    RD following

## 2022-11-01 NOTE — PROGRESS NOTES
"Critical Care Progress Note    Date of admission  10/26/2022    Chief Complaint  \"67 y.o. male, PMH oxygen dependent COPD, 4.5L home oxygen (follows [facility]), CAD with prior PCI, HFrEF with EF 30%, prior ICD placement, T2DM, chronic pain syndrome on narcotics who presented 10/26/2022 in transfer from a [facility] with loculated right pleural effusion and pleuritic chest pain, marked leukocytosis.  He was recently hospitalized from 10/17-10/23 for right lower lobe pneumonia and COPD exacerbation treated with Zosyn per chart review and discharged home on 10/23.  He was rehospitalized the same day requiring ICU and BiPAP therapy.  Right thoracentesis at outside facility with 2.5 L bloody pleural fluid removed not sent for cytology.  Additionally, he was found to have DVT and was started on heparin infusion.  He was unable to lie flat for a CT initially.  Surgery placed a right chest tube on 10/27.  Repeat CT chest was able to be performed on 10/29 and showed no PE, diffuse pleural thickening of the right hemithorax with moderate sized loculated pleural fluid collection and possible right lower lobe lung abscess with dense consolidation of the right middle and lower lobes.  Today he was taken to the operating room and underwent right posterior lateral thoracotomy with pulmonary decortication.  There was concern that lung consolidation may be related to tumor rather than extrinsic compression from infectious empyema as there was very little pus.  Core biopsies were obtained from the lung as well for pathology.  He was extubated in the PACU and is not in distress, 5 L oxygen but high risk for deterioration given his multiorgan failure and oxygen dependent COPD.\" Dr Starks    Hospital Course  10/26 - Admitted to hospital floor    10/30 - Right sided thoracotomy with partial right pulm decortication of RUL/RML.  Admitted to ICU post operatively    10/31 - Afib RVR, load digoxin, restart heparin no bolus/no " rebolus      Interval Problem Update  Reviewed last 24 hour events:  Tmax: Afebrile  Diet: Regular  Vasopressors: Norepinephrine (start hydrocortisone 11/1)    Antibx:   Unasyn 10/28 - present    Cefepime 10/26 - 10/28  Linezolid 10/26 - 10/27    Worsening respiratory pattern today  Cold in extremities  Does not feel well  Start diuresis today      Intake / Output    Intake/Output Summary (Last 24 hours) at 11/1/2022 0938  Last data filed at 11/1/2022 0600  Gross per 24 hour   Intake 1608.17 ml   Output 1458 ml   Net 150.17 ml     Net IO Since Admission: -4,149.61 mL [10/31/22 0640]     Review of Systems   Review of Systems   Constitutional:  Positive for malaise/fatigue. Negative for chills and fever.   HENT:  Negative for congestion and sore throat.    Eyes: Negative.    Respiratory:  Negative for sputum production and shortness of breath.    Cardiovascular:  Positive for chest pain (around drain site, with deep inspiration). Negative for palpitations.   Gastrointestinal:  Negative for abdominal pain, nausea and vomiting.   Genitourinary: Negative.    Musculoskeletal: Negative.    Skin: Negative.    Neurological:  Negative for focal weakness and headaches.   All other systems reviewed and are negative.     Vital Signs for last 24 hours   Pulse:  [] 112  Resp:  [12-60] 27  BP: ()/(48-73) 125/58  SpO2:  [88 %-97 %] 97 %    Hemodynamic parameters for last 24 hours       Respiratory Information for the last 24 hours       Physical Exam   Physical Exam  Vitals and nursing note reviewed. Exam conducted with a chaperone present.   Constitutional:       General: He is not in acute distress.     Appearance: Normal appearance. He is ill-appearing.   HENT:      Head: Normocephalic.      Nose: Nose normal.      Comments: On HFNC     Mouth/Throat:      Mouth: Mucous membranes are moist.   Eyes:      Extraocular Movements: Extraocular movements intact.   Cardiovascular:      Rate and Rhythm: Tachycardia present.  Rhythm irregular.      Pulses: Normal pulses.   Pulmonary:      Effort: Pulmonary effort is normal. No respiratory distress.      Comments: Low lung volumes  Abdominal:      General: There is no distension.      Palpations: Abdomen is soft.      Tenderness: There is no abdominal tenderness. There is no guarding or rebound.   Musculoskeletal:         General: Normal range of motion.      Cervical back: Normal range of motion and neck supple.      Right lower leg: No edema.      Left lower leg: No edema.      Comments: Hands and Feet are cold   Skin:     General: Skin is cool and dry.      Capillary Refill: Capillary refill takes less than 2 seconds.      Coloration: Skin is not cyanotic or mottled.   Neurological:      General: No focal deficit present.      Mental Status: He is alert and oriented to person, place, and time. Mental status is at baseline.       Medications  Current Facility-Administered Medications   Medication Dose Route Frequency Provider Last Rate Last Admin    hydrocortisone sodium succinate PF (Solu-CORTEF) 100 MG injection 50 mg  50 mg Intravenous Q6HRS Luisito Fallon M.D.   50 mg at 11/01/22 0609    midodrine (PROAMATINE) tablet 15 mg  15 mg Oral TID WITH MEALS Luisito Fallon M.D.   15 mg at 11/01/22 0747    furosemide (LASIX) injection 80 mg  80 mg Intravenous BID DIURETIC Luisito Fallon M.D.   80 mg at 11/01/22 0857    digoxin (Lanoxin) injection 125 mcg  125 mcg Intravenous DAILY AT 1800 Luisito Fallon M.D.        heparin infusion 25,000 units in 500 mL 0.45% NACL  0-30 Units/kg/hr (Adjusted) Intravenous Continuous Luisito Fallon M.D. 31.5 mL/hr at 11/01/22 0437 18 Units/kg/hr at 11/01/22 0437    MD Alert...ICU Electrolyte Replacement per Pharmacy   Other PHARMACY TO DOSE Mazin Starks M.D.        norepinephrine (Levophed) 8 mg in 250 mL NS infusion (premix)  0-30 mcg/min Intravenous Continuous DANII Paz 18.8 mL/hr at 11/01/22 0800 10 mcg/min at 11/01/22 0800     ipratropium-albuterol (DUONEB) nebulizer solution  3 mL Nebulization Q4HRS (RT) Jeremy Munson M.D.   3 mL at 11/01/22 0831    sodium chloride (SALT) tablet 2 g  2 g Oral TID WITH MEALS Jeremy Munson M.D.   2 g at 11/01/22 0747    ampicillin/sulbactam (UNASYN) 3 g in  mL IVPB  3 g Intravenous Q6HRS Jeremy Munson M.D.   Stopped at 11/01/22 0611    HYDROmorphone (Dilaudid) injection 1 mg  1 mg Intravenous Q3HRS PRN Jerry Vidal D.O.   1 mg at 10/30/22 0842    oxyCODONE-acetaminophen (PERCOCET-10)  MG per tablet 1 Tablet  1 Tablet Oral Q4HRS PRN Jerry Vidal D.O.   1 Tablet at 11/01/22 0747    senna-docusate (PERICOLACE or SENOKOT S) 8.6-50 MG per tablet 2 Tablet  2 Tablet Oral BID Noe Amador M.D.   2 Tablet at 11/01/22 0543    And    polyethylene glycol/lytes (MIRALAX) PACKET 1 Packet  1 Packet Oral QDAY PRN Noe Amador M.D.   1 Packet at 10/29/22 0503    And    magnesium hydroxide (MILK OF MAGNESIA) suspension 30 mL  30 mL Oral QDAY PRN Noe Amador M.D.        And    bisacodyl (DULCOLAX) suppository 10 mg  10 mg Rectal QDAY PRN Noe Amador M.D.        acetaminophen (Tylenol) tablet 650 mg  650 mg Oral Q6HRS PRN Noe Amador M.D.   650 mg at 10/31/22 0248    ondansetron (ZOFRAN) syringe/vial injection 4 mg  4 mg Intravenous Q4HRS PRN Noe Amador M.D.        ondansetron (ZOFRAN ODT) dispertab 4 mg  4 mg Oral Q4HRS PRN Noe Amador M.D.        Respiratory Therapy Consult   Nebulization Continuous RT Noe Amador M.D.        ipratropium-albuterol (DUONEB) nebulizer solution  3 mL Nebulization Q2HRS PRN (RT) Noe Amador M.D.   3 mL at 10/30/22 2039    atorvastatin (LIPITOR) tablet 40 mg  40 mg Oral Q EVENING Noe Amador M.D.   40 mg at 10/31/22 1754    ferrous sulfate tablet 325 mg  325 mg Oral DAILY Noe Amador M.D.   325 mg at 11/01/22 0543    omeprazole (PRILOSEC) capsule 40 mg  40 mg Oral DAILY Noe Amador M.D.   40 mg at 11/01/22 0542    traZODone (DESYREL)  tablet 100 mg  100 mg Oral Nightly Noe Amador M.D.   100 mg at 10/31/22 2056    pregabalin (LYRICA) capsule 25 mg  25 mg Oral BID Noe Amador M.D.   25 mg at 11/01/22 0547    insulin regular (HumuLIN R,NovoLIN R) injection  1-6 Units Subcutaneous 4X/DAY ACHS Noe Amador M.D.   1 Units at 11/01/22 0759    And    dextrose 50% (D50W) injection 25 g  25 g Intravenous Q15 MIN PRN Noe Amador M.D.        nystatin (MYCOSTATIN) 179063 UNIT/ML suspension 1,000,000 Units  10 mL Swish & Swallow 4X/DAY Jerry Vidal D.O.   1,000,000 Units at 11/01/22 0900    amiodarone (Cordarone) tablet 400 mg  400 mg Oral TWICE DAILY Bart Salazar M.D.   400 mg at 11/01/22 0542       Fluids    Intake/Output Summary (Last 24 hours) at 11/1/2022 0938  Last data filed at 11/1/2022 0600  Gross per 24 hour   Intake 1608.17 ml   Output 1458 ml   Net 150.17 ml       Laboratory  Recent Labs     10/30/22  1927 10/30/22  2150 10/31/22  0232   ISTATAPH 7.249* 7.344* 7.373*   ISTATAPCO2 72.1* 56.3* 53.9*   ISTATAPO2 135* 56* 63*   ISTATATCO2 34* 32 33   CLQMAWL2WNY 98 86* 90*   ISTATARTHCO3 31.5* 30.7* 31.4*   ISTATARTBE 2 4* 5*   ISTATTEMP 96.6 F 99.0 F 97.5 F   ISTATFIO2 100 35 40   ISTATSPEC Arterial Arterial Arterial   ISTATAPHTC 7.264* 7.341* 7.382*   AWJHRXIA2YG 128* 57* 60*         Recent Labs     10/30/22  0820 10/30/22  1740 10/31/22  0419 11/01/22  0339   SODIUM 122* 123* 123* 130*   POTASSIUM 5.1 5.9* 5.7* 5.6*   CHLORIDE 89* 89* 88* 94*   CO2 25 26 26 28   BUN 43* 42* 41* 35*   CREATININE 0.94 0.92 0.94 0.84   MAGNESIUM 2.2  --  2.2 2.3   PHOSPHORUS 2.1*  --   --  2.3*   CALCIUM 7.9* 8.5 8.5 8.7     Recent Labs     10/30/22  0820 10/30/22  1740 10/31/22  0419 11/01/22  0339   ALTSGPT 10  --  11 13   ASTSGOT 16  --  22 22   ALKPHOSPHAT 64  --  63 60   TBILIRUBIN 0.5  --  0.4 0.5   GLUCOSE 239* 236* 210* 205*     Recent Labs     10/30/22  0820 10/30/22  1740 10/31/22  0419 11/01/22  0339   WBC 32.9* 45.5* 32.6* 33.5*    NEUTSPOLYS 90.90*  --  90.90*  --    LYMPHOCYTES 1.30*  --  1.50*  --    MONOCYTES 6.00  --  5.80  --    EOSINOPHILS 0.00  --  0.00  --    BASOPHILS 0.20  --  0.20  --    ASTSGOT 16  --  22 22   ALTSGPT 10  --  11 13   ALKPHOSPHAT 64  --  63 60   TBILIRUBIN 0.5  --  0.4 0.5     Recent Labs     10/30/22  1740 10/31/22  0419 10/31/22  1250 11/01/22  0339   RBC 4.04* 3.87*  --  3.89*   HEMOGLOBIN 11.9* 11.3*  --  11.5*   HEMATOCRIT 35.7* 33.8*  --  34.7*   PLATELETCT 246 208  --  202   PROTHROMBTM  --   --  14.4  --    APTT  --   --  27.2  --    INR  --   --  1.14*  --        Imaging  X-Ray:  I have personally reviewed the images and compared with prior images. and My impression is: worsening opacification of right chest, chest tubes in place    Assessment/Plan  * Loculated pleural effusion  Assessment & Plan  RLL pulmonary abscess with empyema versus underlying malignancy  - gram stain negative thus far  - cultures negative thus far  - awaiting pathology and following up on cultures as days go on    Right thoracotomy and decortication with lung core biopsy sent 10/30  Continue antibiotic therapy with Unasyn  Follow-up chest imaging, WBC and culture/pathology  Surgery following    Shock (HCC)  Assessment & Plan  Persistent shock post surgery, unclear etiology  LV function appears depressed from prior though recent ECHO was inconclusive  Cultures all negative thus far but has been on antibiotics here and at OSH prior, so cannot rule out sepsis  Did not improve with fluid bolus yesterday, despite being net negative unlikely hypovolemia  Could be combination of the above    Will start stress dose steroids 11/1 and monitor for effect  HR controlled with digoxin, which will also help with depressed LV function    Concern with cold extremities today and worsening CXR  Renal function is better, and not acidotic  - regardless, possible cardiogenic shock in nature  - will diurese today and continue digoxin  - consider adding  dobutamine empirically if he develops acidosis or worsening renal function  - for now mentating well, but is cold and wet.    Hyperkalemia  Assessment & Plan  No improvement  Will start diuresis with loop  trend    Hyponatremia- (present on admission)  Assessment & Plan  Likely SIADH with pneumonia, empyema,?  Abscess versus tumor  Sodium 122, slow correction  Follow-up serum sodium level    Improving appropriately  Trend  No roll for 3% currently    Atrial fibrillation with RVR ([facility])- (present on admission)  Assessment & Plan  On amiodarone  Heparin gtt (also for DVT)  - no bolus / no re-bolus given recent surgery    HR is moderately well controlled on digoxin    Lactic acidosis  Assessment & Plan  Stable at 2.9    Acute DVT (deep venous thrombosis) ([facility])- (present on admission)  Assessment & Plan  Heparin infusion held for surgery 10/30  Ok to resume 10/31 with no bolus and no re-bolus  Xa monitoring    Chronic HFrEF (heart failure with reduced ejection fraction) ([facility])- (present on admission)  Assessment & Plan  Reduced LV function on echocardiogram, poor windows  Maintain euvolemia perioperatively    Continue digoxin  Cold in extremities today  Add lasix and follow clinically    Chronic pain syndrome- (present on admission)  Assessment & Plan  Pain controll postoperatively    Acute on chronic respiratory failure with hypoxia ([facility])- (present on admission)  Assessment & Plan  Continue titrated oxygen to SPO2 90-92%  Extubated in PACU after thoracoscopy, high risk for deterioration  Monitor closely in ICU    ICD (implantable cardioverter-defibrillator), [employer]- (present on admission)  Assessment & Plan  Hx of    CAD (coronary artery disease)- (present on admission)  Assessment & Plan  History of MI, PCI and prior ICD placement    GERD (gastroesophageal reflux disease)- (present on admission)  Assessment & Plan  PPI    COPD (chronic obstructive pulmonary disease) ([facility])- (present on admission)  Assessment &  Plan  History of COPD and chronic hypoxia on supple oxygen 4.5 L  Continue bronchodilators, RT protocols       VTE:  Heparin gtt  Ulcer: PPI - home med  Lines: Central Line  Ongoing indication addressed    I have performed a physical exam and reviewed and updated ROS and Plan today (11/1/2022). In review of yesterday's note (10/31/2022), there are no changes except as documented above.     Discussed patient condition and risk of morbidity and/or mortality with RN, RT, Pharmacy, , Charge nurse / hot rounds, Patient, and general surgery    The patient remains critically ill.  Critical care time = 50 minutes in directly providing and coordinating critical care and extensive data review.  No time overlap and excludes procedures.

## 2022-11-01 NOTE — CARE PLAN
Problem: Knowledge Deficit - Standard  Goal: Patient and family/care givers will demonstrate understanding of plan of care, disease process/condition, diagnostic tests and medications  Outcome: Progressing     Problem: Knowledge Deficit - COPD  Goal: Patient/significant other demonstrates understanding of disease process, utilization of the Action Plan, medications and discharge instruction  Outcome: Progressing     Problem: Risk for Infection - COPD  Goal: Patient will remain free from signs and symptoms of infection  Outcome: Progressing     Problem: Nutrition - Advanced  Goal: Patient will display progressive weight gain toward goal have adequate food and fluid intake  Outcome: Progressing     Problem: Ineffective Airway Clearance  Goal: Patient will maintain patent airway with clear/clearing breath sounds  Outcome: Progressing     Problem: Self Care  Goal: Patient will have the ability to perform ADLs independently or with assistance (bathe, groom, dress, toilet and feed)  Outcome: Progressing   The patient is Watcher - Medium risk of patient condition declining or worsening    Shift Goals  Clinical Goals: Decrease Hi Flow; Hemodynamic stability  Patient Goals: pain control  Family Goals: updates and pt comfort    Progress made toward(s) clinical / shift goals:

## 2022-11-01 NOTE — PROGRESS NOTES
"  DATE: 11/1/2022    POD2 Right Thoracotomy, decortication with chest tube placement x2    INTERVAL EVENTS:  Very tenuous pulmonary status, on HFNC  In afib better controlled this morning  CXR looks as expected  Consider Palliative Care consult for goals of care given overall frailty, advanced age, debility, and high likelihood for prolonged hospital course with complications.    PHYSICAL EXAMINATION:  Vital Signs: /58   Pulse (!) 112   Temp 36.4 °C (97.5 °F) (Oral)   Resp (!) 27   Ht 1.803 m (5' 10.98\")   Wt 120 kg (264 lb 8.8 oz)   SpO2 97%     Diminished lung sounds on right with modest effort  CT#1 [facility]ang output  CT#2 serosang output    LABORATORY VALUES:   Recent Labs     10/30/22  1740 10/31/22  0419 11/01/22  0339   WBC 45.5* 32.6* 33.5*   RBC 4.04* 3.87* 3.89*   HEMOGLOBIN 11.9* 11.3* 11.5*   HEMATOCRIT 35.7* 33.8* 34.7*   MCV 88.4 87.3 89.2   MCH 29.5 29.2 29.6   MCHC 33.3* 33.4* 33.1*   RDW 43.4 42.9 44.3   PLATELETCT 246 208 202   MPV 9.5 9.4 9.4         ASSESSMENT AND PLAN:  * Loculated pleural effusion  Assessment & Plan  Small to moderate right-sided pleural effusion on CT from referring facility  10/27 Chest tube placed at bedside with evacuation of 1.3L of serous fluid, Pleural fluid cultures negative  10/29 CT: Moderate size loculated pleural fluid collection, with possible RIGHT lower lobe lung abscess.  Persistent leukocytosis.  10/30 Right posterolateral thoracotomy, partial decortication. Minimal decortication of right upper lobe and right middle lobe surface.  11/1 Multi-modal pain regimen instituted, CT #2 water sealed  Pleural cultures and biopsies pending.  Daily chest x-ray  [facility]    Appreciate ICU and consulting physician care.     ____________________________________     Jacob Prado M.D.    "

## 2022-11-01 NOTE — CARE PLAN
Problem: Bronchopulmonary Hygiene  Goal: Increase mobilization of retained secretions  Description: Target End Date:  2 to 3 days    1.  Perform bronchopulmonary therapy as indicated by assessment  2.  Perform airway suctioning  3.  Perform actions to maintain patient airway  Outcome: Not Met     Problem: Bronchoconstriction  Goal: Improve in air movement and diminished wheezing  Description: Target End Date:  2 to 3 days    1.  Implement inhaled treatments  2.  Evaluate and manage medication effects  Outcome: Not Met     Problem: Humidified High Flow Nasal Cannula  Goal: Maintain adequate oxygenation dependent on patient condition  Description: Target End Date:  resolve prior to discharge or when underlying condition is resolved/stabilized    1.  Implement humidified high flow oxygen therapy  2.  Titrate high flow oxygen to maintain appropriate SpO2  Outcome: Not Met   Duo QID   IPV   FNC

## 2022-11-01 NOTE — DISCHARGE PLANNING
Case Management Discharge Planning    Admission Date: 10/26/2022  GMLOS: 9.6  ALOS: 6    6-Clicks ADL Score: 12  6-Clicks Mobility Score: 13  PT and/or OT Eval ordered: Yes  Post-acute Referrals Ordered: Yes  Post-acute Choice Obtained: Yes  Has referral(s) been sent to post-acute provider:  Yes      Anticipated Discharge Dispo: Discharge Disposition: Discharged to home/self care (01)    DME Needed: No    Action(s) Taken: CM attended ICU rounds. Patient is critically ill. Awaiting PT and OT reccs when patient appropriate. Will follow.     Escalations Completed: None    Medically Clear: No    Next Steps: will follow    Barriers to Discharge: Medical clearance    Is the patient up for discharge tomorrow: No

## 2022-11-01 NOTE — CARE PLAN
Problem: Hemodynamics  Goal: Patient's hemodynamics, fluid balance and neurologic status will be stable or improve  Outcome: Progressing     Problem: Respiratory  Goal: Patient will achieve/maintain optimum respiratory ventilation and gas exchange  Outcome: Not Progressing     Problem: Pain - Standard  Goal: Alleviation of pain or a reduction in pain to the patient’s comfort goal  Outcome: Progressing   The patient is Watcher - Medium risk of patient condition declining or worsening    Shift Goals  Clinical Goals: Hemodynamic stability, decrease oxygen, bowel movement  Patient Goals: pain control and bowel movement  Family Goals: no family present    Progress made toward(s) clinical / shift goals: Vital signs every 15 minutes. Pt on levophed drip. Pt on high flow nasal cannula. Pt encouraged to use IS, cough and deep breathe. Pt given pain medication per scale. Pt resting comfortably after pain medication.     Patient is not progressing towards the following goals:      Problem: Respiratory  Goal: Patient will achieve/maintain optimum respiratory ventilation and gas exchange  Outcome: Not Progressing

## 2022-11-02 PROBLEM — J18.1 CONSOLIDATION LUNG (HCC): Status: ACTIVE | Noted: 2022-01-01

## 2022-11-02 PROBLEM — C34.91 NON-SMALL CELL CANCER OF RIGHT LUNG (HCC): Status: ACTIVE | Noted: 2022-01-01

## 2022-11-02 NOTE — PROGRESS NOTES
"  DATE: 11/2/2022    POD3 Right Thoracotomy, decortication with chest tube placement x2    INTERVAL EVENTS:  Very tenuous pulmonary status, remains on HFNC  CXR worse this morning  Pleural and lung biopsies with poorly differentiated non-small cell lung carcinoma  Consider Palliative Care consult for goals of care given overall frailty, advanced age, debility, and high likelihood for prolonged hospital course with complications.    PHYSICAL EXAMINATION:  Vital Signs: BP (!) 92/56   Pulse (!) 103   Temp 36.4 °C (97.5 °F) (Oral)   Resp (!) 21   Ht 1.803 m (5' 10.98\")   Wt 123 kg (270 lb 11.6 oz)   SpO2 (!) 87%     Diminished lung sounds on right with modest effort  CT#1 [facility]ang output  CT#2 serosang output    LABORATORY VALUES:   Recent Labs     10/31/22  0419 11/01/22  0339 11/02/22  0438   WBC 32.6* 33.5* 33.0*   RBC 3.87* 3.89* 3.68*   HEMOGLOBIN 11.3* 11.5* 10.8*   HEMATOCRIT 33.8* 34.7* 33.1*   MCV 87.3 89.2 89.9   MCH 29.2 29.6 29.3   MCHC 33.4* 33.1* 32.6*   RDW 42.9 44.3 45.4   PLATELETCT 208 202 183   MPV 9.4 9.4 9.3         ASSESSMENT AND PLAN:  * Loculated pleural effusion  Assessment & Plan  Small to moderate right-sided pleural effusion on CT from referring facility  10/27 Chest tube placed at bedside with evacuation of 1.3L of serous fluid, Pleural fluid cultures negative  10/29 CT: Moderate size loculated pleural fluid collection, with possible RIGHT lower lobe lung abscess.  Persistent leukocytosis.  10/30 Right posterolateral thoracotomy, partial decortication. Minimal decortication of right upper lobe and right middle lobe surface.  11/1 Multi-modal pain regimen instituted, CT #2 water sealed  12/2 CT #1 water sealed  Pleural cultures negative so far  Biopsies with non-small cell carcinoma  Daily chest x-ray  [facility]    Appreciate ICU and consulting physician care.     ____________________________________     Jacob Prado M.D.    "

## 2022-11-02 NOTE — ASSESSMENT & PLAN NOTE
Right lung with worsening consolidation on CXR  Bedside US shows no effusion    Consolidation due to:  - poor clearance of secretions possible  - endobronchial obstruction vs extrinsic compression from mass    Next step would be bronchoscopy which given O2 requirements would require intubation - he is determining what his goals are.  Will follow up with his wishes.

## 2022-11-02 NOTE — CARE PLAN
Problem: Bronchopulmonary Hygiene  Goal: Increase mobilization of retained secretions  Description: Target End Date:  2 to 3 days    1.  Perform bronchopulmonary therapy as indicated by assessment  2.  Perform airway suctioning  3.  Perform actions to maintain patient airway  Outcome: Progressing     Problem: Bronchoconstriction  Goal: Improve in air movement and diminished wheezing  Description: Target End Date:  2 to 3 days    1.  Implement inhaled treatments  2.  Evaluate and manage medication effects  Outcome: Progressing     Problem: Humidified High Flow Nasal Cannula  Goal: Maintain adequate oxygenation dependent on patient condition  Description: Target End Date:  resolve prior to discharge or when underlying condition is resolved/stabilized    1.  Implement humidified high flow oxygen therapy  2.  Titrate high flow oxygen to maintain appropriate SpO2  Outcome: Not Met    HF 60 L 80%   Duoneb Q 4  IPV QID

## 2022-11-02 NOTE — CARE PLAN
Problem: Knowledge Deficit - Standard  Goal: Patient and family/care givers will demonstrate understanding of plan of care, disease process/condition, diagnostic tests and medications  Outcome: Progressing     Problem: Knowledge Deficit - COPD  Goal: Patient/significant other demonstrates understanding of disease process, utilization of the Action Plan, medications and discharge instruction  Outcome: Progressing     Problem: Nutrition - Advanced  Goal: Patient will display progressive weight gain toward goal have adequate food and fluid intake  Outcome: Progressing     Problem: Ineffective Airway Clearance  Goal: Patient will maintain patent airway with clear/clearing breath sounds  Outcome: Progressing     Problem: Impaired Gas Exchange  Goal: Patient will demonstrate improved ventilation and adequate oxygenation and participate in treatment regimen within the level of ability/situation.  Outcome: Progressing     Problem: Hemodynamics  Goal: Patient's hemodynamics, fluid balance and neurologic status will be stable or improve  Outcome: Progressing     Problem: Urinary - Renal Perfusion  Goal: Ability to achieve and maintain adequate renal perfusion and functioning will improve  Outcome: Progressing     Problem: Respiratory  Goal: Patient will achieve/maintain optimum respiratory ventilation and gas exchange  Outcome: Progressing     Problem: Fall Risk  Goal: Patient will remain free from falls  Outcome: Progressing     Problem: Pain - Standard  Goal: Alleviation of pain or a reduction in pain to the patient’s comfort goal  Outcome: Progressing     The patient is Watcher - Medium risk of patient condition declining or worsening    Shift Goals  Clinical Goals: HD stability, decrease oxygen, mobility, bowel movement, safety, comfort  Patient Goals: pain control and to sleep  Family Goals: pain control and to sleep    Progress made toward(s) clinical / shift goals:  Pt and family expressed large concern about the pt  receiving pain medications throughout the night and being able to be comfortable enough to sleep. RN established a pain medication administration plan with pt and family before family left for the night. Pt was able to get approximately 6 hours of sleep last night. States pain was controlled enough to achieve goals. Pt states he feels much better this morning. RT decreased oxygen. Pt mobilized to the edge of the bed for 5 minutes.  Pt did not have a bowel movement but bowel sounds are active and pt states he is starting to pass gas. Pt remained free from falls and injuries. RN initially had to increase levo throughout the night but is able to decrease it this AM.     Patient is not progressing towards the following goals:

## 2022-11-02 NOTE — PROGRESS NOTES
"Critical Care Progress Note    Date of admission  10/26/2022    Chief Complaint  \"67 y.o. male, PMH oxygen dependent COPD, 4.5L home oxygen ([facility]), CAD with prior PCI, HFrEF with EF 30%, prior ICD placement, T2DM, chronic pain syndrome on narcotics who presented 10/26/2022 in transfer from a [facility] with loculated right pleural effusion and pleuritic chest pain, marked leukocytosis.  He was recently hospitalized from 10/17-10/23 for right lower lobe pneumonia and COPD exacerbation treated with Zosyn per chart review and discharged home on 10/23.  He was rehospitalized the same day requiring ICU and BiPAP therapy.  Right thoracentesis at outside facility with 2.5 L bloody pleural fluid removed not sent for cytology.  Additionally, he was found to have DVT and was started on heparin infusion.  He was unable to lie flat for a CT initially.  Surgery placed a right chest tube on 10/27.  Repeat CT chest was able to be performed on 10/29 and showed no PE, diffuse pleural thickening of the right hemithorax with moderate sized loculated pleural fluid collection and possible right lower lobe lung abscess with dense consolidation of the right middle and lower lobes.  Today he was taken to the operating room and underwent right posterior lateral thoracotomy with pulmonary decortication.  There was concern that lung consolidation may be related to tumor rather than extrinsic compression from infectious empyema as there was very little pus.  Core biopsies were obtained from the lung as well for pathology.  He was extubated in the PACU and is not in distress, 5 L oxygen but high risk for deterioration given his multiorgan failure and oxygen dependent COPD.\" Dr Starks    Hospital Course  10/26 - Admitted to hospital floor  10/30 - Right sided thoracotomy with partial right pulm decortication of RUL/RML.  Admitted to ICU post operatively  10/31 - Afib RVR, load digoxin, restart heparin no bolus/no " rebolus  11/1 - Worse today, O2 requirements and vasopressor requirements increasing.  11/2 - Discussed results of biopsy with family and patient.  Considering goals.  Palliative consult      Interval Problem Update  Reviewed last 24 hour events:  Tmax: Afebrile  Diet: Regular  Vasopressors: Norepinephrine (start hydrocortisone 11/1)    Antibx:   Unasyn 10/28 - present    Cefepime 10/26 - 10/28  Linezolid 10/26 - 10/27    Extremities warmer  Diuresed yesterday, renal function worse but K improved  CXR worsening despite diuresis, will US today      Intake / Output    Intake/Output Summary (Last 24 hours) at 11/2/2022 1057  Last data filed at 11/2/2022 0600  Gross per 24 hour   Intake 1579.35 ml   Output 2006 ml   Net -426.65 ml     Net IO Since Admission: -4,149.61 mL [10/31/22 0640]     Review of Systems   Review of Systems   Constitutional:  Positive for malaise/fatigue. Negative for chills and fever.   HENT:  Negative for congestion and sore throat.    Eyes: Negative.    Respiratory:  Negative for sputum production and shortness of breath.    Cardiovascular:  Positive for chest pain (around drain site, with deep inspiration). Negative for palpitations.   Gastrointestinal:  Negative for abdominal pain, nausea and vomiting.   Genitourinary: Negative.    Musculoskeletal: Negative.    Skin: Negative.    Neurological:  Negative for focal weakness and headaches.   All other systems reviewed and are negative.     Vital Signs for last 24 hours   Pulse:  [] 103  Resp:  [4-46] 21  BP: ()/(48-71) 92/56  SpO2:  [78 %-96 %] 87 %    Hemodynamic parameters for last 24 hours       Respiratory Information for the last 24 hours       Physical Exam   Physical Exam  Vitals and nursing note reviewed. Exam conducted with a chaperone present.   Constitutional:       General: He is not in acute distress.     Appearance: Normal appearance. He is ill-appearing.   HENT:      Head: Normocephalic.      Nose: Nose normal.       Comments: On HFNC     Mouth/Throat:      Mouth: Mucous membranes are moist.   Eyes:      Extraocular Movements: Extraocular movements intact.   Cardiovascular:      Rate and Rhythm: Tachycardia present. Rhythm irregular.      Pulses: Normal pulses.   Pulmonary:      Effort: Pulmonary effort is normal. No respiratory distress.      Comments: Low lung volumes  Abdominal:      General: There is no distension.      Palpations: Abdomen is soft.      Tenderness: There is no abdominal tenderness. There is no guarding or rebound.   Musculoskeletal:         General: Normal range of motion.      Cervical back: Normal range of motion and neck supple.      Right lower leg: No edema.      Left lower leg: No edema.      Comments: Hands and Feet are warm today   Skin:     General: Skin is cool and dry.      Capillary Refill: Capillary refill takes less than 2 seconds.      Coloration: Skin is not cyanotic or mottled.   Neurological:      General: No focal deficit present.      Mental Status: He is alert and oriented to person, place, and time. Mental status is at baseline.       Medications  Current Facility-Administered Medications   Medication Dose Route Frequency Provider Last Rate Last Admin    Nozin nasal  swab  1 Applicator Each Nostril BID Luisito Fallon M.D.   1 Applicator at 11/02/22 0515    insulin regular (HumuLIN R,NovoLIN R) injection  2-9 Units Subcutaneous 4X/DAY ACHS Luisito Fallon M.D.        And    dextrose 50% (D50W) injection 25 g  25 g Intravenous Q15 MIN PRN Luisito Fallon M.D.        acetylcysteine (MUCOMYST) 20 % solution 3 mL  3 mL Inhalation Q4HRS (RT) Luisito Fallon M.D.        hydrocortisone sodium succinate PF (Solu-CORTEF) 100 MG injection 50 mg  50 mg Intravenous Q6HRS Luisito Fallon M.D.   50 mg at 11/02/22 0533    midodrine (PROAMATINE) tablet 15 mg  15 mg Oral TID WITH MEALS Luisito Fallon M.D.   15 mg at 11/02/22 0817    acetaminophen (Tylenol) tablet 650 mg  650 mg Oral 4X/DAY Jacob NESBITT  JULIO Prado   650 mg at 11/01/22 2215    celecoxib (CELEBREX) capsule 100 mg  100 mg Oral DAILY Jacob Prado M.D.   100 mg at 11/02/22 0531    methocarbamol (ROBAXIN) tablet 500 mg  500 mg Oral 4X/DAY Jacob Prado M.D.   500 mg at 11/01/22 2215    oxyCODONE immediate-release (ROXICODONE) tablet 5 mg  5 mg Oral Q4HRS PRN Jacob Prado M.D.        Or    oxyCODONE immediate release (ROXICODONE) tablet 10 mg  10 mg Oral Q4HRS PRN Jacob Prado M.D.   10 mg at 11/02/22 0818    HYDROmorphone (Dilaudid) injection 0.5 mg  0.5 mg Intravenous Q2HRS PRN Jacob Prado M.D.   0.5 mg at 11/02/22 1013    pregabalin (LYRICA) capsule 75 mg  75 mg Oral BID Jacob Prado M.D.   75 mg at 11/02/22 0532    Icy Hot Balm Extra Strength 7.6-29 % topical ointment   Topical PRN Luisito Fallon M.D.        digoxin (Lanoxin) injection 125 mcg  125 mcg Intravenous DAILY AT 1800 Luisito Fallon M.D.   125 mcg at 11/01/22 1828    heparin infusion 25,000 units in 500 mL 0.45% NACL  0-30 Units/kg/hr (Adjusted) Intravenous Continuous Luisito Fallon M.D. 28 mL/hr at 11/02/22 0525 16 Units/kg/hr at 11/02/22 0525    MD Alert...ICU Electrolyte Replacement per Pharmacy   Other PHARMACY TO DOSE Mazin Starks M.D.        norepinephrine (Levophed) 8 mg in 250 mL NS infusion (premix)  0-30 mcg/min Intravenous Continuous DANII Paz 26.3 mL/hr at 11/02/22 0633 14 mcg/min at 11/02/22 0633    ipratropium-albuterol (DUONEB) nebulizer solution  3 mL Nebulization Q4HRS (RT) Jeremy Munson M.D.   3 mL at 11/02/22 0624    sodium chloride (SALT) tablet 2 g  2 g Oral TID WITH MEALS Jeremy Munson M.D.   2 g at 11/02/22 0818    ampicillin/sulbactam (UNASYN) 3 g in  mL IVPB  3 g Intravenous Q6HRS Jacob Prado M.D.   Stopped at 11/02/22 0603    senna-docusate (PERICOLACE or SENOKOT S) 8.6-50 MG per tablet 2 Tablet  2 Tablet Oral BID Noe Amador M.D.   2 Tablet at 11/02/22 0532    And    polyethylene  glycol/lytes (MIRALAX) PACKET 1 Packet  1 Packet Oral QDAY PRN Noe Amador M.D.   1 Packet at 11/02/22 0533    And    magnesium hydroxide (MILK OF MAGNESIA) suspension 30 mL  30 mL Oral QDAY PRN Noe Amador M.D.        And    bisacodyl (DULCOLAX) suppository 10 mg  10 mg Rectal QDAY PRN Noe Amador M.D.        acetaminophen (Tylenol) tablet 650 mg  650 mg Oral Q6HRS PRN Noe Amador M.D.   650 mg at 10/31/22 0248    ondansetron (ZOFRAN) syringe/vial injection 4 mg  4 mg Intravenous Q4HRS PRN Neo Amador M.D.        ondansetron (ZOFRAN ODT) dispertab 4 mg  4 mg Oral Q4HRS PRN Noe Amador M.D.        Respiratory Therapy Consult   Nebulization Continuous RT Noe Amador M.D.        ipratropium-albuterol (DUONEB) nebulizer solution  3 mL Nebulization Q2HRS PRN (RT) Noe Amador M.D.   3 mL at 10/30/22 2039    atorvastatin (LIPITOR) tablet 40 mg  40 mg Oral Q EVENING Noe Amador M.D.   40 mg at 11/01/22 1829    ferrous sulfate tablet 325 mg  325 mg Oral DAILY Noe Amador M.D.   325 mg at 11/02/22 0531    omeprazole (PRILOSEC) capsule 40 mg  40 mg Oral DAILY Noe Amador M.D.   40 mg at 11/02/22 0532    traZODone (DESYREL) tablet 100 mg  100 mg Oral Nightly Noe Amador M.D.   100 mg at 11/01/22 2215    nystatin (MYCOSTATIN) 600347 UNIT/ML suspension 1,000,000 Units  10 mL Swish & Swallow 4X/DAY Jerry Vidal D.O.   1,000,000 Units at 11/02/22 0817    amiodarone (Cordarone) tablet 400 mg  400 mg Oral TWICE DAILY Bart Salazar M.D.   400 mg at 11/02/22 0532       Fluids    Intake/Output Summary (Last 24 hours) at 11/2/2022 1057  Last data filed at 11/2/2022 0600  Gross per 24 hour   Intake 1579.35 ml   Output 2006 ml   Net -426.65 ml       Laboratory  Recent Labs     10/30/22  1927 10/30/22  2150 10/31/22  0232   ISTATAPH 7.249* 7.344* 7.373*   ISTATAPCO2 72.1* 56.3* 53.9*   ISTATAPO2 135* 56* 63*   ISTATATCO2 34* 32 33   DNDUTSZ9YVQ 98 86* 90*   ISTATARTHCO3 31.5* 30.7* 31.4*   ISTATARTBE 2 4*  5*   ISTATTEMP 96.6 F 99.0 F 97.5 F   ISTATFIO2 100 35 40   ISTATSPEC Arterial Arterial Arterial   ISTATAPHTC 7.264* 7.341* 7.382*   MEYGAMQU6XN 128* 57* 60*         Recent Labs     10/31/22  0419 11/01/22  0339 11/01/22  1415 11/02/22  0438   SODIUM 123* 130* 128* 126*   POTASSIUM 5.7* 5.6* 5.4 5.3   CHLORIDE 88* 94* 92* 90*   CO2 26 28 29 27   BUN 41* 35* 40* 45*   CREATININE 0.94 0.84 1.07 1.20   MAGNESIUM 2.2 2.3  --  2.1   PHOSPHORUS  --  2.3*  --  3.6   CALCIUM 8.5 8.7 8.1* 8.1*     Recent Labs     10/31/22  0419 11/01/22  0339 11/01/22  1415 11/02/22  0438   ALTSGPT 11 13  --  14   ASTSGOT 22 22  --  20   ALKPHOSPHAT 63 60  --  61   TBILIRUBIN 0.4 0.5  --  0.4   GLUCOSE 210* 205* 274* 239*     Recent Labs     10/31/22  0419 11/01/22  0339 11/02/22  0438   WBC 32.6* 33.5* 33.0*   NEUTSPOLYS 90.90*  --   --    LYMPHOCYTES 1.50*  --   --    MONOCYTES 5.80  --   --    EOSINOPHILS 0.00  --   --    BASOPHILS 0.20  --   --    ASTSGOT 22 22 20   ALTSGPT 11 13 14   ALKPHOSPHAT 63 60 61   TBILIRUBIN 0.4 0.5 0.4     Recent Labs     10/31/22  0419 10/31/22  1250 11/01/22 0339 11/02/22 0438   RBC 3.87*  --  3.89* 3.68*   HEMOGLOBIN 11.3*  --  11.5* 10.8*   HEMATOCRIT 33.8*  --  34.7* 33.1*   PLATELETCT 208  --  202 183   PROTHROMBTM  --  14.4  --   --    APTT  --  27.2  --   --    INR  --  1.14*  --   --        Imaging  X-Ray:  I have personally reviewed the images and compared with prior images. and My impression is: Continued worsening Right chest, suspecting increasing size effusion vs consolidation - will follow up with bedside ultrasound    Assessment/Plan  * Loculated pleural effusion  Assessment & Plan  RLL pulmonary abscess with empyema versus underlying malignancy  - gram stain negative thus far  - cultures negative thus far  - awaiting pathology and following up on cultures as days go on    Right thoracotomy and decortication with lung core biopsy sent 10/30  Continue antibiotic therapy with Unasyn  Follow-up  chest imaging, WBC and culture/pathology  Surgery following    Effusion is malignant in nature secondary to NSCC of right lung  Severe consolidation of RML/RLL, likely due to obstructive disease from lung CA, possibly from secretions though given longevity I suspect not    Consolidation lung (HCC)  Assessment & Plan  Right lung with worsening consolidation on CXR  Bedside US shows no effusion    Consolidation due to:  - poor clearance of secretions possible  - endobronchial obstruction vs extrinsic compression from mass    Next step would be bronchoscopy which given O2 requirements would require intubation - he is determining what his goals are.  Will follow up with his wishes.      Non-small cell cancer of right lung (HCC)  Assessment & Plan  Diagnosed on pathology from thoracotomy  Poorly differentiated  +AE1/AE3, CK7, and TTF-1    Most likely adenocarcinoma  Perhaps focal squamous differentiation     Too sick for staging CT unless intubated  Palliative care consult    I had a long discussion with he and his family at bedside.  I revealed the diagnosis and provided some options.  They are considering code status change and perhaps pursuing comfort.      Shock (HCC)  Assessment & Plan  Persistent shock post surgery, unclear etiology  LV function appears depressed from prior though recent ECHO was inconclusive  Cultures all negative thus far but has been on antibiotics here and at OSH prior, so cannot rule out sepsis  Did not improve with fluid bolus yesterday, despite being net negative unlikely hypovolemia  Could be combination of the above    Will start stress dose steroids 11/1 and monitor for effect  HR controlled with digoxin, which will also help with depressed LV function    Concern with cold extremities today and worsening CXR  Renal function slightly worse with diuresis  - regardless, possible cardiogenic shock in nature  - continue digoxin  - for now mentating well  - warm in extremities    Lung  consolidation not due to pulmonary edema, more likely to to collapse from endobronchial lesion vs extrinsic compression from lung ca    Hyperkalemia  Assessment & Plan  Improved after diuresis  Trend    Hyponatremia- (present on admission)  Assessment & Plan  Likely SIADH from lung cancer  Worsened with diuresis, will hold off on further    Trend    If he continues to want aggressive care will start 3%    Atrial fibrillation with RVR ([facility])- (present on admission)  Assessment & Plan  On amiodarone  Heparin gtt (also for DVT)  - no bolus / no re-bolus given recent surgery    HR is moderately well controlled on digoxin    Lactic acidosis  Assessment & Plan  Stable at 2.9    Acute DVT (deep venous thrombosis) ([facility])- (present on admission)  Assessment & Plan  Heparin infusion held for surgery 10/30  Ok to resume 10/31 with no bolus and no re-bolus  Xa monitoring    Chronic HFrEF (heart failure with reduced ejection fraction) ([facility])- (present on admission)  Assessment & Plan  Reduced LV function on echocardiogram, poor windows  Maintain euvolemia perioperatively    Continue digoxin  Cold in extremities today  Add lasix and follow clinically    Chronic pain syndrome- (present on admission)  Assessment & Plan  Pain controll postoperatively    Acute on chronic respiratory failure with hypoxia ([facility])- (present on admission)  Assessment & Plan  Continue titrated oxygen to SPO2 90-92%  Extubated in PACU after thoracoscopy, high risk for deterioration  Monitor closely in ICU    ICD (implantable cardioverter-defibrillator), [employer]- (present on admission)  Assessment & Plan  Hx of    CAD (coronary artery disease)- (present on admission)  Assessment & Plan  History of MI, PCI and prior ICD placement    GERD (gastroesophageal reflux disease)- (present on admission)  Assessment & Plan  PPI    COPD (chronic obstructive pulmonary disease) ([facility])- (present on admission)  Assessment & Plan  History of COPD and chronic hypoxia on supple  oxygen 4.5 L  Continue bronchodilators, RT protocols       VTE:  Heparin gtt  Ulcer: PPI - home med  Lines: Central Line  Ongoing indication addressed    I have performed a physical exam and reviewed and updated ROS and Plan today (11/2/2022). In review of yesterday's note (11/1/2022), there are no changes except as documented above.     Discussed patient condition and risk of morbidity and/or mortality with RN, RT, Pharmacy, , Charge nurse / hot rounds, Patient, and general surgery    The patient remains critically ill.  Critical care time = 62 minutes in directly providing and coordinating critical care and extensive data review.  No time overlap and excludes procedures.

## 2022-11-02 NOTE — ASSESSMENT & PLAN NOTE
Diagnosed on pathology from thoracotomy  Poorly differentiated  +AE1/AE3, CK7, and TTF-1    Most likely adenocarcinoma  Perhaps focal squamous differentiation     Too sick for staging CT unless intubated  Palliative care consult    I had a long discussion with he and his family at bedside.  I revealed the diagnosis and provided some options.  They are considering code status change and perhaps pursuing comfort.

## 2022-11-03 NOTE — DOCUMENTATION QUERY
[facility]                                                                       Query Response Note      PATIENT:               MIRLANDE DOZIER  ACCT #:                  [id number]  MRN:                     [id number]  :                      1955  ADMIT DATE:       10/26/2022 11:11 AM  DISCH DATE:          RESPONDING  PROVIDER #:        [id number]           QUERY TEXT:    Sepsis is in the Problem List of the H&P and Progress notes through 10/30, and dropped from the Problem List in subsequent Progress Notes.  Can the status of the diagnosis of sepsis be clarified?    The patient's clinical indicators include:  H&P and Progress Notes: severe sepsis POA , SIRS include tachycardia, tachypnea, and leukocytosis. Source is pulmonary   Per  Progress Note: effusion is malignant in nature , non small cell cancer of right lung   10/26 WBC 54 , procal 0.11, CRP 3.00, lactic acid 3.6  Admit vitals: 114/83 , 100, 18, 97.3F, 96%; 10/26 RR 18-40  Risk Factors: pna, empyema   Treatment: Unasyn, Ancef, IVF    Thank you,  Diana Munoz RN, BSN  Clinical   Connect via [employer]  Options provided:   -- Sepsis is ruled in   -- Sepsis has resolved   -- Sepsis has been ruled out, SIRS ruled in   -- Other explanation, please specify   -- Unable to determine      Query created by: Diana Munoz on 2022 11:46 AM    RESPONSE TEXT:    Sepsis has been ruled out, SIRS ruled in          Electronically signed by:  Luisito Fallon MD 2022 5:08 PM

## 2022-11-03 NOTE — DOCUMENTATION QUERY
Critical access hospital                                                                       Query Response Note      PATIENT:               MIRLANDE DOZIER  ACCT #:                  [id number]  MRN:                     [id number]  :                      1955  ADMIT DATE:       10/26/2022 11:11 AM  DISCH DATE:        2022 11:45 PM  RESPONDING  PROVIDER #:        [id number]           QUERY TEXT:    Shock is documented in the Medical Record.  Please specify the type.    The patient's Clinical Indicators include:  Per  Progress Note: Shock: persistent shock post surgery, unclear etiology.  LV function appears depressed from prior echo, though recent echo was inconclusive.  Did not improve w/ fluid bolus yesterday, despite being net negative unlikely hypovolemia.  Possible cardiogenic shock in nature    Query response: Sepsis ruled out  Risk Factors: depressed LV function, CHF, atrial fib   Treatment: digoxin , IVF bolus, levophed, echo     Thank you,  Diana Munoz RN, BSN  Clinical   Connect via [employer]  Options provided:   -- Likely cardiogenic shock   -- Other type of shock, please specify   -- Unclear etiology of shock   -- Unable to determine      Query created by: Diana Munoz on 11/3/2022 8:07 AM    RESPONSE TEXT:    Likely cardiogenic shock          Electronically signed by:  Luisito Fallon MD 11/3/2022 11:52 AM

## 2022-11-03 NOTE — CARE PLAN
The patient is Unstable - High likelihood or risk of patient condition declining or worsening    Shift Goals  Clinical Goals: comfort measures  Patient Goals: pain control and comfort  Family Goals: pain control and comfrot    Progress made toward(s) clinical / shift goals:  pt has medication for pain and anxiety for comfort measures. Family at bedside    Problem: Pain - Standard  Goal: Alleviation of pain or a reduction in pain to the patient’s comfort goal  Outcome: Progressing     Problem: Self Care  Goal: Patient will have the ability to perform ADLs independently or with assistance (bathe, groom, dress, toilet and feed)  Outcome: Progressing     Problem: Knowledge Deficit - Standard  Goal: Patient and family/care givers will demonstrate understanding of plan of care, disease process/condition, diagnostic tests and medications  Outcome: Progressing       Patient is not progressing towards the following goals:

## 2022-11-03 NOTE — CARE PLAN
Problem: Bronchoconstriction  Goal: Improve in air movement and diminished wheezing  Description: Target End Date:  2 to 3 days    1.  Implement inhaled treatments  2.  Evaluate and manage medication effects  Outcome: Not Met     Problem: Humidified High Flow Nasal Cannula  Goal: Maintain adequate oxygenation dependent on patient condition  Description: Target End Date:  resolve prior to discharge or when underlying condition is resolved/stabilized    1.  Implement humidified high flow oxygen therapy  2.  Titrate high flow oxygen to maintain appropriate SpO2  Outcome: Not Met   Duo QID   HHFNC 50 100

## 2022-11-03 NOTE — DISCHARGE SUMMARY
"Death Summary    Cause of Death  Hypoxic respiratory failure due to right lung consolidation due to obstructive lesiosn due to adenocarcinoma.    Comorbid Conditions at the Time of Death  Principal Problem:    Loculated pleural effusion POA: Unknown  Active Problems:    Atrial fibrillation with RVR (HCC) POA: Yes    Hyponatremia POA: Yes    Hyperkalemia POA: Unknown    Shock (HCC) POA: Unknown    Non-small cell cancer of right lung (HCC) POA: Unknown    Consolidation lung (HCC) POA: Unknown    Chronic HFrEF (heart failure with reduced ejection fraction) (HCC) POA: Yes    Acute DVT (deep venous thrombosis) (HCC) POA: Yes    Lactic acidosis POA: Unknown    COPD (chronic obstructive pulmonary disease) (HCC) POA: Yes    GERD (gastroesophageal reflux disease) POA: Yes    CAD (coronary artery disease) POA: Yes    ICD (implantable cardioverter-defibrillator), [employer] POA: Yes      Overview: July 2018: Medtronic Evera MRI XT  [id number] implanted by Dr. Ayon       ([facility]).    Acute on chronic respiratory failure with hypoxia (HCC) POA: Yes    Chronic pain syndrome POA: Yes  Resolved Problems:    Former tobacco use - quit Sep 2017 in setting of STEMI (Chronic) POA: Yes      History of Presenting Illness and Hospital Course  \"67 y.o. male, PMH oxygen dependent COPD, 4.5L home oxygen (follows [facility]), CAD with prior PCI, HFrEF with EF 30%, prior ICD placement, T2DM, chronic pain syndrome on narcotics who presented 10/26/2022 in transfer from a [facility] with loculated right pleural effusion and pleuritic chest pain, marked leukocytosis.  He was recently hospitalized from 10/17-10/23 for right lower lobe pneumonia and COPD exacerbation treated with Zosyn per chart review and discharged home on 10/23.  He was rehospitalized the same day requiring ICU and BiPAP therapy.  Right thoracentesis at outside facility with 2.5 L bloody pleural fluid removed not sent for cytology.  Additionally, he was found to have DVT " "and was started on heparin infusion.  He was unable to lie flat for a CT initially.  Surgery placed a right chest tube on 10/27.  Repeat CT chest was able to be performed on 10/29 and showed no PE, diffuse pleural thickening of the right hemithorax with moderate sized loculated pleural fluid collection and possible right lower lobe lung abscess with dense consolidation of the right middle and lower lobes.  Today he was taken to the operating room and underwent right posterior lateral thoracotomy with pulmonary decortication.  There was concern that lung consolidation may be related to tumor rather than extrinsic compression from infectious empyema as there was very little pus.  Core biopsies were obtained from the lung as well for pathology.  He was extubated in the PACU and is not in distress, 5 L oxygen but high risk for deterioration given his multiorgan failure and oxygen dependent COPD.\"    10/26 - Admitted to hospital floor  10/30 - Right sided thoracotomy with partial right pulm decortication of RUL/RML.  Admitted to ICU post operatively  10/31 - Afib RVR, load digoxin, restart heparin no bolus/no rebolus  11/1 - Worse today, O2 requirements and vasopressor requirements increasing.  11/2 - Discussed results of biopsy with family and patient.  Considering goals.  Palliative consult    On the afternoon of 11/2 after discussions with his family and the ICU physician, he elected to transition to comfort care and passed away peacefully at 2345 on 11/2/2022     Death Date: 11/02/22   Death Time: 2345         Pronounced By (RN1): Dionna Manuel  Pronounced By (RN2): Gurpreet Segura     "

## 2022-11-03 NOTE — PLAN OF CARE (IOPOC)
I had several discussions with Mr. Cervantes's family and he at the bedside today.  We discussed the diagnosis of lung cancer, his overall deconditioned status and the fact that he is continuing to worsen.    We discussed the pros and cons of intubation and mechanical ventilation.  Specifically that I had no clear and path to get him off of the ventilator once on and that I did not think it would change his outcome or offer him any meaningful amount of time to spend with his family.  Ultimately I think he is dying from progression of his disease and his family is understanding that.    Mr. Cervantes took the day to consider all of these facts, I spoke with he and his family a few times and at the end of the day he decided that he wanted to be made comfortable and allowed to die naturally.  He does not wish to undergo further invasive or painful procedures and does not want to be on a ventilator.    Given this I have instituted the comfort care order set and we will make him DNR/DNI.    Luisito Fallon M.D.

## 2022-11-04 LAB
BACTERIA TISS AEROBE CULT: NORMAL
GRAM STN SPEC: NORMAL
SIGNIFICANT IND 70042: NORMAL
SITE SITE: NORMAL
SOURCE SOURCE: NORMAL

## 2022-11-05 LAB
BACTERIA SPEC ANAEROBE CULT: NORMAL
SIGNIFICANT IND 70042: NORMAL
SITE SITE: NORMAL
SOURCE SOURCE: NORMAL

## 2022-11-21 NOTE — DOCUMENTATION QUERY
[facility]                                                                       Query Response Note      PATIENT:               MIRLANDE DOZIER  ACCT #:                  [id number]  MRN:                     [id number]  :                      1955  ADMIT DATE:       10/26/2022 11:11 AM  DISCH DATE:        2022 11:45 PM  RESPONDING  PROVIDER #:        [id number]           QUERY TEXT:    Based upon the clinical indicators provided and your clinical judgement, please select and document the most appropriate diagnosis:    The patient's Clinical Indicators include:  10/28 Dietary Eval:  pt meets ASPEN criteria for severe acute illness related malnutrition in the setting of PNA, hospitalizations, and pleural effusions as evidenced by reduced PO intake (<50% for 1 month per dietary recall) and severe 8-8.5% weight loss in 1 month   Risk Factors:  lung cancer, COPD, acute respiratory failure w/ hypoxia, chronic pain, HFrEF, PNA, pleural effusion  Treatment:  dietary eval, encourage/ document PO intake, Boost supplements, monitor weight, nutrition rep    Thank you,  Diana Munoz RN, BSN  Clinicial   Connect via [employer]  Options provided:   -- Severe protein calorie malnutrition   -- Moderate protein calorie malnutrition   -- Severe protein calorie malnutrition   -- Other explanation, please specify   -- Findings of no clinical significance   -- Unable to determine      Query created by: Diana Munoz on 2022 6:56 AM    RESPONSE TEXT:    Severe protein calorie malnutrition          Electronically signed by:  Luisito Fallon MD 2022 8:13 AM

## 2022-12-14 LAB
MYCOBACTERIUM SPEC CULT: NORMAL
RHODAMINE-AURAMINE STN SPEC: NORMAL
SIGNIFICANT IND 70042: NORMAL
SITE SITE: NORMAL
SOURCE SOURCE: NORMAL

## 2023-11-07 NOTE — ASSESSMENT & PLAN NOTE
Stable at 2.9   Acitretin Counseling:  I discussed with the patient the risks of acitretin including but not limited to hair loss, dry lips/skin/eyes, liver damage, hyperlipidemia, depression/suicidal ideation, photosensitivity.  Serious rare side effects can include but are not limited to pancreatitis, pseudotumor cerebri, bony changes, clot formation/stroke/heart attack.  Patient understands that alcohol is contraindicated since it can result in liver toxicity and significantly prolong the elimination of the drug by many years.

## (undated) DEVICE — CANISTER SUCTION 3000ML MECHANICAL FILTER AUTO SHUTOFF MEDI-VAC NONSTERILE LF DISP  (40EA/CA)

## (undated) DEVICE — CONTAINER SPECIMEN BAG OR - STERILE 4 OZ W/LID (100EA/CA)

## (undated) DEVICE — COVER LIGHT HANDLE ALC PLUS DISP (18EA/BX)

## (undated) DEVICE — CLIP SM INTNL HRZN TI ESCP LGT - (24EA/PK 25PK/BX)

## (undated) DEVICE — SUTURE 2-0 VICRYL PLUS CT-1 36 (36PK/BX)"

## (undated) DEVICE — SUTURE 4-0 PROLENE SH 36 (36PK/BX)"

## (undated) DEVICE — SPONGE KITTNER DISSECTORS - (5EA/PK 50PK/CA)

## (undated) DEVICE — SET EXTENSION WITH 2 PORTS (48EA/CA) ***PART #2C8610 IS A SUBSTITUTE*****

## (undated) DEVICE — CLIP MED INTNL HRZN TI ESCP - (25/BX)

## (undated) DEVICE — SUTURE 3-0 PROLENE RB-1 D/A 36 (36PK/BX)"

## (undated) DEVICE — LACTATED RINGERS INJ 1000 ML - (14EA/CA 60CA/PF)

## (undated) DEVICE — PACK MAJOR BASIN - (2EA/CA)

## (undated) DEVICE — DRESSING PETROLEUM GAUZE 5 X 9" (50EA/BX 4BX/CA)"

## (undated) DEVICE — GOWN WARMING STANDARD FLEX - (30/CA)

## (undated) DEVICE — SUTURE CV

## (undated) DEVICE — GLOVE BIOGEL INDICATOR SZ 7.5 SURGICAL PF LTX - (50PR/BX 4BX/CA)

## (undated) DEVICE — DRAPE IOBAN II INCISE 23X17 - (10EA/BX 4BX/CA)

## (undated) DEVICE — BOVIE  BLADE 6 EXTENDED - (50/PK)

## (undated) DEVICE — NEEDLE MONOPTY 14GAX16CM - (10EA/CA)

## (undated) DEVICE — GLOVE BIOGEL SZ 7.5 SURGICAL PF LTX - (50PR/BX 4BX/CA)

## (undated) DEVICE — SUTURE 0 SILK CT-1 (36PK/BX)

## (undated) DEVICE — SUTURE 3-0 SILK 12 X 18 IN - (36/BX)

## (undated) DEVICE — DRESSING NON-ADHERING 8 X 3 - (50/BX)

## (undated) DEVICE — SYSTEM CHEST DRAIN ADULT/PEDS W/AUTO TRANSFUSION CAPABILITY SAHARA (6EA/CA)

## (undated) DEVICE — CONNECTOR Y TBG CRTY 5 IN 1 STERILE (50EA/CA)

## (undated) DEVICE — BLADE SURGICAL #15 - (50/BX 3BX/CA)

## (undated) DEVICE — TUBING CLEARLINK DUO-VENT - C-FLO (48EA/CA)

## (undated) DEVICE — SUTURE 0 SILK TIES (36PK/BX)

## (undated) DEVICE — SUTURE 3-0 VICRYL PLUS FS-1 27 (36PK/BX)"

## (undated) DEVICE — DRAPE STRLE REG TOWEL 18X24 - (10/BX 4BX/CA)"

## (undated) DEVICE — GOWN SURGICAL X-LARGE ULTRA - FILM-REINFORCED (20/CA)

## (undated) DEVICE — SODIUM CHL IRRIGATION 0.9% 1000ML (12EA/CA)

## (undated) DEVICE — TRAP SPECIMEN MUCUS STERILE - (50/CA)

## (undated) DEVICE — SLEEVE, VASO, THIGH, MED

## (undated) DEVICE — SUCTION INSTRUMENT YANKAUER BULBOUS TIP W/O VENT (50EA/CA)

## (undated) DEVICE — SUTURE GENERAL

## (undated) DEVICE — SUTURE 2-0 SILK 12 X 18" (36PK/BX)"

## (undated) DEVICE — CONNECTOR HUBLESS DRAINAGE - ONE WAY (20/BX)

## (undated) DEVICE — SOD. CHL. INJ. 0.9% 1000 ML - (14EA/CA 60CA/PF)

## (undated) DEVICE — DRAPE CHEST/BREAST - (12EA/CA)

## (undated) DEVICE — SENSOR OXIMETER ADULT SPO2 RD SET (20EA/BX)

## (undated) DEVICE — CLIP LG INTNL HRZN TI ESCP LGT - (20/BX)

## (undated) DEVICE — CLIP MED LG INTNL HRZN TI ESCP - (20/BX)

## (undated) DEVICE — ELECTRODE DUAL RETURN W/ CORD - (50/PK)

## (undated) DEVICE — SUTURE 1 VICRYL PLUS CTX - 8 X 18 INCH (12/BX)

## (undated) DEVICE — STAPLER SKIN DISP - (6/BX 10BX/CA) VISISTAT

## (undated) DEVICE — SPONGE GAUZESTER 4 X 4 4PLY - (128PK/CA)

## (undated) DEVICE — CHLORAPREP 26 ML APPLICATOR - ORANGE TINT(25/CA)

## (undated) DEVICE — SET LEADWIRE 5 LEAD BEDSIDE DISPOSABLE ECG (1SET OF 5/EA)